# Patient Record
Sex: FEMALE | Race: WHITE | NOT HISPANIC OR LATINO | Employment: OTHER | URBAN - METROPOLITAN AREA
[De-identification: names, ages, dates, MRNs, and addresses within clinical notes are randomized per-mention and may not be internally consistent; named-entity substitution may affect disease eponyms.]

---

## 2017-01-09 ENCOUNTER — APPOINTMENT (OUTPATIENT)
Dept: LAB | Facility: HOSPITAL | Age: 37
End: 2017-01-09
Attending: INTERNAL MEDICINE
Payer: COMMERCIAL

## 2017-01-09 ENCOUNTER — TRANSCRIBE ORDERS (OUTPATIENT)
Dept: ADMINISTRATIVE | Facility: HOSPITAL | Age: 37
End: 2017-01-09

## 2017-01-09 DIAGNOSIS — K76.9 UNSPECIFIED CHRONIC LIVER DISEASE WITHOUT MENTION OF ALCOHOL: Primary | ICD-10-CM

## 2017-01-09 DIAGNOSIS — K76.9 UNSPECIFIED CHRONIC LIVER DISEASE WITHOUT MENTION OF ALCOHOL: ICD-10-CM

## 2017-01-09 LAB
ALBUMIN SERPL BCP-MCNC: 4.1 G/DL (ref 3.5–5)
ALP SERPL-CCNC: 44 U/L (ref 46–116)
ALT SERPL W P-5'-P-CCNC: 24 U/L (ref 12–78)
ANION GAP SERPL CALCULATED.3IONS-SCNC: 7 MMOL/L (ref 4–13)
AST SERPL W P-5'-P-CCNC: 18 U/L (ref 5–45)
BASOPHILS # BLD AUTO: 0.05 THOUSANDS/ΜL (ref 0–0.1)
BASOPHILS NFR BLD AUTO: 1 % (ref 0–1)
BILIRUB SERPL-MCNC: 0.44 MG/DL (ref 0.2–1)
BUN SERPL-MCNC: 19 MG/DL (ref 5–25)
CALCIUM SERPL-MCNC: 9.1 MG/DL (ref 8.3–10.1)
CHLORIDE SERPL-SCNC: 104 MMOL/L (ref 100–108)
CO2 SERPL-SCNC: 28 MMOL/L (ref 21–32)
CREAT SERPL-MCNC: 0.83 MG/DL (ref 0.6–1.3)
EOSINOPHIL # BLD AUTO: 0.06 THOUSAND/ΜL (ref 0–0.61)
EOSINOPHIL NFR BLD AUTO: 1 % (ref 0–6)
ERYTHROCYTE [DISTWIDTH] IN BLOOD BY AUTOMATED COUNT: 12.9 % (ref 11.6–15.1)
GFR SERPL CREATININE-BSD FRML MDRD: >60 ML/MIN/1.73SQ M
GLUCOSE SERPL-MCNC: 94 MG/DL (ref 65–140)
HCT VFR BLD AUTO: 39.7 % (ref 34.8–46.1)
HGB BLD-MCNC: 13.7 G/DL (ref 11.5–15.4)
LYMPHOCYTES # BLD AUTO: 1.61 THOUSANDS/ΜL (ref 0.6–4.47)
LYMPHOCYTES NFR BLD AUTO: 31 % (ref 14–44)
MCH RBC QN AUTO: 31.3 PG (ref 26.8–34.3)
MCHC RBC AUTO-ENTMCNC: 34.5 G/DL (ref 31.4–37.4)
MCV RBC AUTO: 91 FL (ref 82–98)
MONOCYTES # BLD AUTO: 0.68 THOUSAND/ΜL (ref 0.17–1.22)
MONOCYTES NFR BLD AUTO: 13 % (ref 4–12)
NEUTROPHILS # BLD AUTO: 2.81 THOUSANDS/ΜL (ref 1.85–7.62)
NEUTS SEG NFR BLD AUTO: 54 % (ref 43–75)
NRBC BLD AUTO-RTO: 0 /100 WBCS
PLATELET # BLD AUTO: 236 THOUSANDS/UL (ref 149–390)
PMV BLD AUTO: 10.6 FL (ref 8.9–12.7)
POTASSIUM SERPL-SCNC: 4.2 MMOL/L (ref 3.5–5.3)
PROT SERPL-MCNC: 7.9 G/DL (ref 6.4–8.2)
RBC # BLD AUTO: 4.38 MILLION/UL (ref 3.81–5.12)
SODIUM SERPL-SCNC: 139 MMOL/L (ref 136–145)
WBC # BLD AUTO: 5.21 THOUSAND/UL (ref 4.31–10.16)

## 2017-01-09 PROCEDURE — 85025 COMPLETE CBC W/AUTO DIFF WBC: CPT

## 2017-01-09 PROCEDURE — 80053 COMPREHEN METABOLIC PANEL: CPT

## 2017-01-09 PROCEDURE — 36415 COLL VENOUS BLD VENIPUNCTURE: CPT

## 2017-01-16 ENCOUNTER — ALLSCRIPTS OFFICE VISIT (OUTPATIENT)
Dept: OTHER | Facility: OTHER | Age: 37
End: 2017-01-16

## 2017-03-02 ENCOUNTER — ALLSCRIPTS OFFICE VISIT (OUTPATIENT)
Dept: OTHER | Facility: OTHER | Age: 37
End: 2017-03-02

## 2017-03-07 ENCOUNTER — LAB CONVERSION - ENCOUNTER (OUTPATIENT)
Dept: OTHER | Facility: OTHER | Age: 37
End: 2017-03-07

## 2017-03-07 ENCOUNTER — APPOINTMENT (OUTPATIENT)
Dept: LAB | Facility: HOSPITAL | Age: 37
End: 2017-03-07
Payer: COMMERCIAL

## 2017-03-07 ENCOUNTER — TRANSCRIBE ORDERS (OUTPATIENT)
Dept: ADMINISTRATIVE | Facility: HOSPITAL | Age: 37
End: 2017-03-07

## 2017-03-07 DIAGNOSIS — B18.2 CHRONIC HEPATITIS C WITH HEPATIC COMA (HCC): Primary | ICD-10-CM

## 2017-03-07 DIAGNOSIS — B18.2 CHRONIC HEPATITIS C WITH HEPATIC COMA (HCC): ICD-10-CM

## 2017-03-07 PROCEDURE — 86706 HEP B SURFACE ANTIBODY: CPT

## 2017-03-07 PROCEDURE — 87340 HEPATITIS B SURFACE AG IA: CPT

## 2017-03-07 PROCEDURE — 36415 COLL VENOUS BLD VENIPUNCTURE: CPT

## 2017-03-07 PROCEDURE — 86708 HEPATITIS A ANTIBODY: CPT

## 2017-03-08 LAB
HAV AB SER QL IA: NORMAL
HBV SURFACE AB SER-ACNC: <3.1 MIU/ML
HBV SURFACE AG SER QL: NORMAL

## 2017-03-09 ENCOUNTER — ALLSCRIPTS OFFICE VISIT (OUTPATIENT)
Dept: OTHER | Facility: OTHER | Age: 37
End: 2017-03-09

## 2017-03-09 DIAGNOSIS — H02.9 DISORDER OF EYELID: ICD-10-CM

## 2017-03-20 ENCOUNTER — ALLSCRIPTS OFFICE VISIT (OUTPATIENT)
Dept: OTHER | Facility: OTHER | Age: 37
End: 2017-03-20

## 2017-03-29 ENCOUNTER — ALLSCRIPTS OFFICE VISIT (OUTPATIENT)
Dept: OTHER | Facility: OTHER | Age: 37
End: 2017-03-29

## 2017-04-06 ENCOUNTER — GENERIC CONVERSION - ENCOUNTER (OUTPATIENT)
Dept: OTHER | Facility: OTHER | Age: 37
End: 2017-04-06

## 2017-04-19 ENCOUNTER — ALLSCRIPTS OFFICE VISIT (OUTPATIENT)
Dept: OTHER | Facility: OTHER | Age: 37
End: 2017-04-19

## 2017-04-19 DIAGNOSIS — R51 HEADACHE(784.0): ICD-10-CM

## 2017-05-04 ENCOUNTER — GENERIC CONVERSION - ENCOUNTER (OUTPATIENT)
Dept: OTHER | Facility: OTHER | Age: 37
End: 2017-05-04

## 2017-05-12 ENCOUNTER — LAB REQUISITION (OUTPATIENT)
Dept: LAB | Facility: HOSPITAL | Age: 37
End: 2017-05-12
Payer: COMMERCIAL

## 2017-05-12 ENCOUNTER — ALLSCRIPTS OFFICE VISIT (OUTPATIENT)
Dept: OTHER | Facility: OTHER | Age: 37
End: 2017-05-12

## 2017-05-12 DIAGNOSIS — J02.9 ACUTE PHARYNGITIS: ICD-10-CM

## 2017-05-12 LAB — S PYO AG THROAT QL: NEGATIVE

## 2017-05-12 PROCEDURE — 87070 CULTURE OTHR SPECIMN AEROBIC: CPT | Performed by: PHYSICIAN ASSISTANT

## 2017-05-12 PROCEDURE — 87147 CULTURE TYPE IMMUNOLOGIC: CPT | Performed by: PHYSICIAN ASSISTANT

## 2017-05-14 LAB — BACTERIA THROAT CULT: NORMAL

## 2017-05-16 ENCOUNTER — GENERIC CONVERSION - ENCOUNTER (OUTPATIENT)
Dept: OTHER | Facility: OTHER | Age: 37
End: 2017-05-16

## 2017-08-24 ENCOUNTER — APPOINTMENT (OUTPATIENT)
Dept: LAB | Facility: HOSPITAL | Age: 37
End: 2017-08-24
Payer: COMMERCIAL

## 2017-08-24 ENCOUNTER — TRANSCRIBE ORDERS (OUTPATIENT)
Dept: ADMINISTRATIVE | Facility: HOSPITAL | Age: 37
End: 2017-08-24

## 2017-08-24 DIAGNOSIS — B18.2 CHRONIC HEPATITIS C WITH HEPATIC COMA (HCC): Primary | ICD-10-CM

## 2017-08-24 DIAGNOSIS — B18.2 CHRONIC HEPATITIS C WITH HEPATIC COMA (HCC): ICD-10-CM

## 2017-08-24 PROCEDURE — 87522 HEPATITIS C REVRS TRNSCRPJ: CPT

## 2017-08-24 PROCEDURE — 36415 COLL VENOUS BLD VENIPUNCTURE: CPT

## 2017-08-28 LAB
HCV RNA SERPL NAA+PROBE-ACNC: NORMAL IU/ML
TEST INFORMATION: NORMAL

## 2017-09-07 ENCOUNTER — GENERIC CONVERSION - ENCOUNTER (OUTPATIENT)
Dept: OTHER | Facility: OTHER | Age: 37
End: 2017-09-07

## 2017-09-29 ENCOUNTER — ALLSCRIPTS OFFICE VISIT (OUTPATIENT)
Dept: OTHER | Facility: OTHER | Age: 37
End: 2017-09-29

## 2018-01-09 NOTE — RESULT NOTES
Verified Results  * XR SHOULDER 2+ VIEW RIGHT [id number] 02:55PM Reno Bright Order Number: [id number]     Test Name Result Flag Reference   XR SHOULDER 2+ VW RIGHT (Report)     RIGHT SHOULDER     INDICATION: Right shoulder pain  COMPARISON: None     VIEWS: 3; 3 images     FINDINGS:     There is no acute fracture or dislocation  No degenerative changes  No lytic or blastic lesions are seen  Soft tissues are unremarkable  IMPRESSION:     No acute osseous abnormality         Workstation performed: [id number]     Signed by:   Zari Walker MD   2/15/16

## 2018-01-11 NOTE — RESULT NOTES
Discussion/Summary    Chlamydia positive  Pt will require tx with Azithromycin 1,000 mg PO x1  Rx was sent to pharmacy, and pt was notified by office staff yesterday  Pt has appt 8/10/16  Please confirm that she has received treatment at that visit  Repeat screening is also recommended 3 months after treatment            Results/Data     (1) CHLAMYDIA/GC AMPLIFIED DNA, PCR   N  GONORRHOEAE AMPLIFIED DNA: N  gonorrhoeae Amplified DNA Negative   Reference Range N  gonorrhoeae Amplified DNA Negative  CHLAMYDIA,AMPLIFIED DNA PROBE: C  trachomatis Amplified DNA POSITIVE   Abnormal Reference Range C  trachomatis Amplified DNA Negative    Signatures   Electronically signed by : Riley Gama MD; Aug  6 2016  8:13PM EST                       (Author)

## 2018-01-12 NOTE — MISCELLANEOUS
Message  I called and left a message on the patient's voicemail  Message regarding throat culture result        Plan  Acute pharyngitis    · Amoxicillin 875 MG Oral Tablet; TAKE 1 TABLET EVERY 12 HOURS DAILY    Signatures   Electronically signed by : RAJENDRA Parra ; Jul 11 2016  4:54PM EST                       (Author)

## 2018-01-13 VITALS
WEIGHT: 160.5 LBS | OXYGEN SATURATION: 98 % | TEMPERATURE: 96.9 F | HEART RATE: 109 BPM | HEIGHT: 69 IN | RESPIRATION RATE: 18 BRPM | DIASTOLIC BLOOD PRESSURE: 60 MMHG | BODY MASS INDEX: 23.77 KG/M2 | SYSTOLIC BLOOD PRESSURE: 116 MMHG

## 2018-01-13 VITALS
BODY MASS INDEX: 24.59 KG/M2 | SYSTOLIC BLOOD PRESSURE: 102 MMHG | OXYGEN SATURATION: 99 % | WEIGHT: 166 LBS | TEMPERATURE: 97 F | DIASTOLIC BLOOD PRESSURE: 60 MMHG | HEIGHT: 69 IN | RESPIRATION RATE: 18 BRPM | HEART RATE: 125 BPM

## 2018-01-13 VITALS
HEART RATE: 72 BPM | TEMPERATURE: 96.7 F | HEIGHT: 69 IN | BODY MASS INDEX: 23.44 KG/M2 | DIASTOLIC BLOOD PRESSURE: 70 MMHG | RESPIRATION RATE: 18 BRPM | WEIGHT: 158.25 LBS | SYSTOLIC BLOOD PRESSURE: 110 MMHG | OXYGEN SATURATION: 100 %

## 2018-01-13 NOTE — MISCELLANEOUS
Message  I spoke with the patient and informed her that her throat culture was positive  Return I need to place her on amoxicillin for her throat infection  She expressed understanding  Prescription sent electronically to [facility] on [address]        Plan  Acute pharyngitis    · Amoxicillin 875 MG Oral Tablet; TAKE 1 TABLET EVERY 12 HOURS DAILY    Signatures   Electronically signed by : RAJENDRA Rocha ; Jul 11 2016  4:54PM EST                       (Author)

## 2018-01-13 NOTE — PROGRESS NOTES
Assessment    1  Encounter for routine gynecological examination (V72 31) (Z01 419)   2  Current every day smoker (305 1) (F17 200)   3  Encounter for smoking cessation counseling (V65 42,305 1) (Z71 6,Z72 0)    Plan  Encounter for routine gynecological examination    · (1) THIN PREP PAP WITH IMAGING; Status:Active; Requested for:21Mar2016;    Perform:[facility]; RJY:[id number];JEFFY;  Luis Momin for routine gynecological examination; Ordered By:Daryl Montemayor; Maturation index required? : No  HPV? : Regardless of Interpretation  Encounter for smoking cessation counseling    · Nicotine 14 MG/24HR Transdermal Patch 24 Hour; APPLY 1 PATCH DAILY AS  DIRECTED   Rx By: Jovan Crisostomo; Dispense: 14 Days ; #:14 Patch 24 Hour; Refill: 0; For: Encounter for smoking cessation counseling; DENISE = N; Verified Transmission to [facility] [place] [address] [address]; Last Updated By: System, ZENT; 3/21/2016 12:04:45 PM   · Nicotine 21 MG/24HR Transdermal Patch 24 Hour; APPLY 1 PATCH DAILY AS  DIRECTED   Rx By: Jovan Crisostomo; Dispense: 42 Days ; #:42 Patch 24 Hour; Refill: 0; For: Encounter for smoking cessation counseling; DENISE = N; Verified Transmission to [facility]-[address] [place] [address] [address]; Last Updated By: [employer]; 3/21/2016 12:04:43 PM   · Nicotine 7 MG/24HR Transdermal Patch 24 Hour; APPLY 1 PATCH DAILY AS  DIRECTED   Rx By: Jovan Crisostomo; Dispense: 14 Days ; #:14 Patch 24 Hour; Refill: 0; For: Encounter for smoking cessation counseling; DENISE = N; [facility] [place] [address] [address]; Last Updated By: System, [employer]; 3/21/2016 12:04:44 PM  Trichomonal vulvovaginitis    · MetroNIDAZOLE 500 MG Oral Tablet; TAKE 4 TABLETS ONCE   Rx By: Jovan Crisostomo; Dispense: 2 Days ; #:8 Tablet; Refill: 0; For: Trichomonal vulvovaginitis; DENISE = N; [facility] [address]; Msg to Pharmacy: 1x 2 gram dose for pt, 1x 2 gram dose for partner (Trich);  Last Updated By: System, ZENT; 3/21/2016 12:02:35 PM    Discussion/Summary  health maintenance visit Currently, she eats a healthy diet  Pap test was done today Breast cancer screening: breast cancer screening is not indicated  Colorectal cancer screening: colorectal cancer screening is not indicated  Osteoporosis screening: bone mineral density testing is not indicated  Advice and education were given regarding nutrition and aerobic exercise  42 yo [id number] (twins) sexually active female w/partner w/vasectomy presents for annual gyn exam   1  diet, weight loss, exercise: counselled  2  smoking cessation: nicotine patch prescribed  3  Pap: collected  4  Trichimoniasis: flagyl sent 2g x2 doses (1 for her, 1 for partner)  RTC in 2 wks for Trich test of cure and Pap f/u  Chief Complaint  Patient is here for annual exam       History of Present Illness  HPI: 42 yo V2Y0013 (twins) sexually active female w/partner w/vasectomy presents for annual gyn exam     Ob hx: 1x c/s, 2xSVD  Gyn hx: LMP 3/3/2016, menarche at 13, regular periods of 28 day cycles w/5 days of moderate menses, no skipped periods or cramping   Menopausal sx: denies dyspareunia, hot flashes, night sweats  Sexual hx: opposite sex, vaginal, oral, anal, 1 partner in last 13 months, age of intercourse first onset 13  Birth control hx: vasectomy in partner  STI hx: genital warts --no recurrence since 2004  Pap hx: NILM but positive co-test other high risk type 2015; [facility] and in 2014 was ASCUS w/positive high-risk HPV and never had colposcopy    Mammogram hx: never  Colonoscopy hx: never  DEXA hx: never  Mental questionnaire score: 0  Social hx: smoke 1 ppd, wishes to quit, 12 step NA program completed July 4, 2013: crack + heroin + "everything"  Infectious disease: Hepatitis C (tx: GI doctor states viral load is extremely low, genotype is undeterminable, will f/u in 1 year)     Trich: pt denies fever, chills, vaginal itching or discharge, dysuria, burning sensation on urination  Speculum exam: no strawberry cervix, white mucoid discharge from cervix in posterior fornix collected, KOH neg but Wet mount positive for Trichomoniasis   [facility] The patient is being seen for a health maintenance evaluation  General Health: The patient's health since the last visit is described as good  She has regular dental visits  She complains of vision problems  Vision care includes wearing reading glasses  She denies hearing loss  Immunizations status: up to date  Lifestyle:  She consumes a diverse and healthy diet  She exercises regularly  She exercises less than three times a week  She uses tobacco  The patient is a current cigarette smoker  She denies alcohol use  She denies drug use  Reproductive health:  she reports no menstrual problems  she uses contraception  For contraception, she has a partner with a vasectomy  she is sexually active  She is monogamous with a male partner  pregnancy history:  Screening:      Review of Systems  no pelvic pain, no pelvic pressure, no vaginal pain, no vaginal discharge, no vaginal itching, no vulvar pain, no vulvar itching, no vulvar lump or mass, no dysmenorrhea, denied amenorrhea, no menorrhagia, no dysuria, no bladder pain, no hematuria and no burning sensation during urination  Constitutional: no fever and no chills  ENT: no sore throat and no hoarseness  Cardiovascular: no chest pain and no palpitations  Gastrointestinal: no constipation and no diarrhea  Genitourinary: no dysuria and no unexplained vaginal bleeding  Integumentary: no rashes  Over the past 2 weeks, how often have you been bothered by the following problems? Score 0      OB History  Pregnancy History (Brief):   Prior pregnancies: : 5  Para: 3 (full-term), 3 (abortions) and 3 (living)   Delivery type: 2 vaginal, 1  section   1 set of twins      Active Problems    1   Acute sinusitis (461 9) (J01 90)   2  Acute upper respiratory infection (465 9) (J06 9)   3  Condyloma acuminata (078 11) (A63 0)   4  Depression (311) (F32 9)   5  Headache (784 0) (R51)   6  Hemorrhoid (455 6) (K64 9)   7  Hepatitis C (070 70) (B19 20)   8  High risk HPV infection (079 4) (A63 0)   9  History of heroin abuse (305 53) (Z87 898)   10  Screen for STD (sexually transmitted disease) (V74 5) (Z11 3)   11  Shoulder pain, right (719 41) (M25 511)   12  Tear of right scapholunate ligament (842 19) (S63 8X1A)   13  Trichomonal vulvovaginitis (131 01) (A59 01)   14  Vaginal candidiasis (112 1) (B37 3)   15  Wrist pain, right (719 43) (M25 531)    Surgical History    · History of  Section    Family History    · Family history of Diabetes mellitus    Social History    · Current every day smoker (305 1) (F17 200)   · H/O intravenous drug use in remission (305 93) (Z87 898)   · No alcohol use    Current Meds   1  Sertraline HCl - 100 MG Oral Tablet; TAKE 1 AND 1/2 TABLETS DAILY; Therapy: 57HCD3982 to (Last Rx:48Jnx5279)  Requested for: 26VNT0461 Ordered   2  Zoloft TABS Recorded    Allergies    1  No Known Drug Allergies    Vitals   Recorded: 75BQN2956 89:18KZ   Systolic 967   Diastolic 74   Height 5 ft 8 11 in   Weight 154 lb    BMI Calculated 23 34   BSA Calculated 1 83   LMP 92PDY4976     Physical Exam    Constitutional   General appearance: No acute distress, well appearing and well nourished  Neck   Neck: Normal, supple, trachea midline, no masses  Thyroid: Normal, no thyromegaly  Pulmonary   Respiratory effort: No increased work of breathing or signs of respiratory distress  Auscultation of lungs: Clear to auscultation  Cardiovascular   Auscultation of heart: Normal rate and rhythm, normal S1 and S2, no murmurs  Genitourinary   External genitalia: Normal and no lesions appreciated  Vagina: Normal, no lesions or dryness appreciated      Urethra: Normal     Urethral meatus: Normal     Bladder: Normal, soft, non-tender and no prolapse or masses appreciated  Cervix: Normal, no palpable masses  Uterus: Normal, non-tender, not enlarged, and no palpable masses  Adnexa/parametria: Normal, non-tender and no fullness or masses appreciated  Chest   Breasts: Normal and no dimpling or skin changes noted  Abdomen   Abdomen: Normal, non-tender, and no organomegaly noted  Skin   Skin and subcutaneous tissue: Normal skin turgor and no rashes  Palpation of skin and subcutaneous tissue: Normal     Psychiatric   Orientation to person, place, and time: Normal     Mood and affect: Normal        Future Appointments    Date/Time Provider Specialty Site   04/20/2016 09:50 AM RAJENDRA Darden   Orthopedic Surgery [facility]     Signatures   Electronically signed by : RAJENDRA Guzman ; Mar 21 2016 12:09PM EST                       (Author)

## 2018-01-14 VITALS
HEART RATE: 72 BPM | BODY MASS INDEX: 24.34 KG/M2 | OXYGEN SATURATION: 98 % | RESPIRATION RATE: 16 BRPM | DIASTOLIC BLOOD PRESSURE: 76 MMHG | HEIGHT: 69 IN | WEIGHT: 164.31 LBS | SYSTOLIC BLOOD PRESSURE: 124 MMHG | TEMPERATURE: 96.7 F

## 2018-01-15 ENCOUNTER — OFFICE VISIT (OUTPATIENT)
Dept: URGENT CARE | Facility: MEDICAL CENTER | Age: 38
End: 2018-01-15
Payer: COMMERCIAL

## 2018-01-15 VITALS
DIASTOLIC BLOOD PRESSURE: 76 MMHG | SYSTOLIC BLOOD PRESSURE: 124 MMHG | TEMPERATURE: 97.2 F | RESPIRATION RATE: 18 BRPM | OXYGEN SATURATION: 98 % | HEART RATE: 90 BPM | HEIGHT: 69 IN | WEIGHT: 167.56 LBS | BODY MASS INDEX: 24.82 KG/M2

## 2018-01-15 VITALS
WEIGHT: 162 LBS | BODY MASS INDEX: 23.99 KG/M2 | SYSTOLIC BLOOD PRESSURE: 120 MMHG | HEIGHT: 69 IN | RESPIRATION RATE: 18 BRPM | TEMPERATURE: 97.4 F | HEART RATE: 80 BPM | OXYGEN SATURATION: 99 % | DIASTOLIC BLOOD PRESSURE: 72 MMHG

## 2018-01-15 DIAGNOSIS — R68.89 OTHER GENERAL SYMPTOMS AND SIGNS: ICD-10-CM

## 2018-01-15 PROCEDURE — G0463 HOSPITAL OUTPT CLINIC VISIT: HCPCS

## 2018-01-15 PROCEDURE — 99214 OFFICE O/P EST MOD 30 MIN: CPT

## 2018-01-15 NOTE — MISCELLANEOUS
Provider Comments  Provider Comments:   PT NO SHOW FOR 2WKS RESULTS      Signatures   Electronically signed by : Dianne Muniz, ; Apr 11 2016  1:28PM EST                       (Author)    Electronically signed by : DENNISE Raines;  Apr 11 2016  4:00PM EST                       (Acknowledgement)    Electronically signed by : Josue Araujo MD; Apr 21 2016  8:36AM EST

## 2018-01-15 NOTE — RESULT NOTES
Verified Results  (1) THROAT CULTURE (CULTURE, UPPER RESPIRATORY) [id number] 03:38PM Dannielle Whiting     Test Name Result Flag Reference   CLINICAL REPORT (Report)     Test:        Throat culture  Specimen Source:  Throat  Specimen Type:   Throat  Specimen Date:   5/12/2017 3:38 PM  Result Date:    5/14/2017 11:36 AM  Result Status:   Final result  Resulting Lab:   [place] [address]            Tel: [phone]      CULTURE                                       ------------------                                   1+ Growth of Beta Hemolytic Streptococcus NOT Group A, C, or G

## 2018-01-16 ENCOUNTER — ALLSCRIPTS OFFICE VISIT (OUTPATIENT)
Dept: OTHER | Facility: OTHER | Age: 38
End: 2018-01-16

## 2018-01-16 ENCOUNTER — APPOINTMENT (OUTPATIENT)
Dept: LAB | Facility: HOSPITAL | Age: 38
End: 2018-01-16
Payer: COMMERCIAL

## 2018-01-16 DIAGNOSIS — R68.89 OTHER GENERAL SYMPTOMS AND SIGNS: ICD-10-CM

## 2018-01-16 LAB
FLUAV AG SPEC QL: ABNORMAL
FLUBV AG SPEC QL: DETECTED
RSV B RNA SPEC QL NAA+PROBE: ABNORMAL

## 2018-01-16 PROCEDURE — 87798 DETECT AGENT NOS DNA AMP: CPT

## 2018-01-16 NOTE — RESULT NOTES
Verified Results  * XR WRIST 3+ VIEW RIGHT [id number] 02:55PM Irina Villeda Order Number: [id number]     Test Name Result Flag Reference   XR WRIST 3+ VW RIGHT (Report)     RIGHT WRIST     INDICATION:  Right scaphoid pain  No known injury  COMPARISON: None     VIEWS: 4; 4 images     FINDINGS:     There is no acute fracture or dislocation  No degenerative changes  No lytic or blastic lesions are seen  Soft tissues are unremarkable  IMPRESSION:     Normal examination         Workstation performed: [id number]     Signed by:   Jennifer Ford MD   2/15/16

## 2018-01-16 NOTE — PROGRESS NOTES
Assessment   1  Flu-like symptoms (780 99) (R68 89)  2  Cough (786 2) (R05)    Plan   Cough    · Start: Bromfed DM 30-2-10 MG/5ML Oral Syrup; SWALLOW 10 ML Every 6 hours PRN  Flu-like symptoms    · Start: Oseltamivir Phosphate 75 MG Oral Capsule (Tamiflu); TAKE 1 CAPSULE TWICE    DAILY WITH MEALS    Discussion/Summary   Discussion Summary:    May alternate Tylenol and Ibuprofen as needed  fluids and rest   nasal spray as needed  bedroom  water gargles  spray and lozenges as needed  Tamiflu as directed  flu swab results  with PCP if symptoms persist/worsen or go to nearest emergency department if any signs of distress  Medication Side Effects Reviewed: Possible side effects of new medications were reviewed with the patient/guardian today  Understands and agrees with treatment plan: The treatment plan was reviewed with the patient/guardian  The patient/guardian understands and agrees with the treatment plan    Counseling Documentation With Imm: The patient was counseled regarding instructions for management  Follow Up Instructions: Follow Up with your Primary Care Provider in 3-5 days  If your symptoms worsen, go to the nearest [place] [address] Emergency Department  Chief Complaint   Chief Complaint Free Text Note Form: Patient presents with hot sweats and feeling cold  History of Present Illness   HPI: Patient presents with hot sweats and feeling cold since yesterday  Reports about one week ago had been coughing up mucus and rhinorrhea which is getting worse though yesterday with feeling of hot and cold, extreme body aches, sinus pressure, as well as diarrhea  Has been taking Ibuprofen 800 mg BID, also honey and tea, cough lozenges, sudafed yesterday, with no improvement of symptoms  Current smoker  Did not have flu vaccine this season  No seasonal allergies  Review of Systems   Focused-Female:      Constitutional: feeling poorly-- and-- chills, but-- no fever        ENT: nasal discharge, but-- no earache-- and-- no sore throat  Cardiovascular: no complaints of slow or fast heart rate, no chest pain, no palpitations, no leg claudication or lower extremity edema  Respiratory: no complaints of shortness of breath, no wheezing, no dyspnea on exertion, no orthopnea or PND  Gastrointestinal: no complaints of abdominal pain, no constipation, no nausea or diarrhea, no vomiting, no bloody stools  Musculoskeletal: no myalgias  Integumentary: no rashes  Active Problems   1  Abnormal menses (626 9) (N92 6)  2  Cervical cancer screening (V76 2) (Z12 4)  3  Condyloma acuminata (078 11) (A63 0)  4  Contraception (V25 9) (Z30 9)  5  Depression (311) (F32 9)  6  Encounter for Medicare annual wellness exam (V70 0) (Z00 00)  7  Encounter for smoking cessation counseling (V65 42,305 1) (Z71 6,Z72 0)  8  Encounter to discuss test results (V65 49) (Z71 89)  9  Follow-up for resolved condition (V67 59) (Z09)  10  Headache (784 0) (R51)  11  Hemorrhoid (455 6) (K64 9)  12  High risk HPV infection (079 4) (B97 7)  13  History of heroin abuse (305 53) (Z87 898)  14  Need for influenza vaccination (V04 81) (Z23)  15  Personal history of nicotine dependence (V15 82) (Z87 891)  16  Sacroiliitis (720 2) (M46 1)  17  Screen for STD (sexually transmitted disease) (V74 5) (Z11 3)  18  Trichomonal vulvovaginitis (131 01) (A59 01)  19  Vaginal candidiasis (112 1) (B37 3)    Past Medical History   1  History of Abnormality of eyelid (374 9) (H02 9)  2  History of Acute frontal sinusitis (461 1) (J01 10)  3  Acute pharyngitis (462) (J02 9)  4  History of Acute upper respiratory infection (465 9) (J06 9)  5  History of Cellulitis of left upper eyelid (373 13) (H00 034)  6  History of Costochondral pain (786 52) (R07 1)  7  History of Encounter for routine gynecological examination (V72 31) (Z01 419)  8  History of Ganglion cyst of wrist, right (117 41) (I40 712)  9   History of abdominal pain (V13 89) (Z87 898)  10  History of acute sinusitis (V12 69) (Z87 09)  11  History of chlamydia infection (V12 09) (Z86 19)  12  History of drug abuse (305 93) (Z87 898)  13  History of gastritis (V12 79) (Z87 19)  14  History of hepatitis C virus infection (V12 09) (Z86 19)  15  History of sore throat (V12 60) (Z87 09)  16  History of Hordeolum externum of left eye, unspecified eyelid (373 11) (H00 016)  17  History of Shoulder pain, right (719 41) (M25 511)  18  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)  19  History of Stye (373 11) (H00 019)  20  History of Tear of right scapholunate ligament (842 19) (S63 8X1A)  21  History of Tightness in chest (786 59) (R07 89)  22  History of Wrist pain, right (719 43) (M25 531)  Active Problems And Past Medical History Reviewed: The active problems and past medical history were reviewed and updated today  Family History   Mother   1  Family history of myocardial infarction (V17 3) (Z82 49)  2  Family history of Leiomyosarcoma  Father   3  Family history of epilepsy (V17 2) (Z82 0)  Brother   4  Family history of Diabetes mellitus  Family History Reviewed: The family history was reviewed and updated today  Social History    · Current every day smoker (305 1) (F17 200)   · History of Current every day smoker (305 1) (F17 200)   · Daily caffeine consumption, 6-8 servings a day   · H/O intravenous drug use in remission (305 93) (L73 614)   · No alcohol use  Social History Reviewed: The social history was reviewed and updated today  The social history was reviewed and is unchanged  Surgical History   1  History of  Section  2  History of Surgical Treatment For   Surgical History Reviewed: The surgical history was reviewed and updated today  Current Meds   1  Harvoni  MG Oral Tablet; Therapy: 21AAK4401 to Recorded  2   Nicotine 14 MG/24HR Transdermal Patch 24 Hour; APPLY 1 PATCH DAILY, AFTER     COMPLETING 21 MG PATCH; Therapy: 45QJF7560 to (Evaluate:26May2017)  Requested for: 46VXF8745; Last     Rx:12May2017 Ordered  3  Nicotine 21 MG/24HR Transdermal Patch 24 Hour; PLACE 1 PATCH Daily; Therapy: 28CLR2627 to (Evaluate:23Jun2017)  Requested for: 02DIK1511; Last     Rx:12May2017 Ordered  4  Nicotine 7 MG/24HR Transdermal Patch 24 Hour; APPLY 1 PATCH DAILY, AFTER     COMPLETING 14 MG PATCH; Therapy: 00FBM1031 to (Evaluate:26May2017)  Requested for: 32PAA4105; Last     Rx:12May2017 Ordered  5  Pentasa 500 MG Oral Capsule Extended Release; Therapy: (Recorded:10Aug2016) to Recorded  6  Sertraline HCl - 100 MG Oral Tablet; TAKE 1 AND 1/2 TABLET BY MOUTH ONCE DAILY; Therapy: 04LJU4266 to ([id number])  Requested for: 35KQD3029; Last     Rx:68Ojv4540 Ordered  Medication List Reviewed: The medication list was reviewed and updated today  Allergies   1  No Known Drug Allergies    Vitals   Signs   Recorded: 91RSB9688 05:04PM   Temperature: 99 4 F  Heart Rate: 114  Respiration: 16  Systolic: 946  Diastolic: 70  Height: 5 ft 9 in  Weight: 168 lb   BMI Calculated: 24 81  BSA Calculated: 1 92  O2 Saturation: 99    Physical Exam        Constitutional      General appearance: No acute distress, well appearing and well nourished  -- Pleasant and cooperative  Appears ill  Eyes      Conjunctiva and lids: No swelling, erythema or discharge  Pupils and irises: Equal, round and reactive to light  Ears, Nose, Mouth, and Throat      External inspection of ears and nose: Normal        Otoscopic examination: Tympanic membranes translucent with normal light reflex  Canals patent without erythema  Nasal mucosa, septum, and turbinates: Normal without edema or erythema  Oropharynx: Abnormal  -- Posterior pharynx mildly erythematous no exudate noted  Pulmonary      Respiratory effort: No increased work of breathing or signs of respiratory distress         Auscultation of lungs: Clear to auscultation  Cardiovascular      Palpation of heart: Normal PMI, no thrills  Auscultation of heart: Normal rate and rhythm, normal S1 and S2, without murmurs  Examination of extremities for edema and/or varicosities: Normal        Lymphatic      Palpation of lymph nodes in neck: No lymphadenopathy  -- soft non tender anterior cervical lymphadenopathy  Musculoskeletal      Gait and station: Normal        Skin      Skin and subcutaneous tissue: Normal without rashes or lesions  Psychiatric      Orientation to person, place, and time: Normal        Mood and affect: Normal        Provider Comments   Provider Comments:    I have reviewed the student's history and physical, I have personally examined the patient and agree with the above stated findings and plan  At this time will start patient on Tamiflu and recommend symptomatic management  Follow up with PCP for further management  Message   Return to work or school:    Sheridan HUBBARD is under my professional care  She was seen in my office on Monday, January 15, 2018    She is able to return to work on  May return when afebrile x 24 hours  No restrictions  DENNISE Baird        Future Appointments      Date/Time Provider Specialty Site   01/16/2018 02:20 PM Jil More, [address] [place] [address]     Signatures    Electronically signed by : Jessica Paula, [address] [place] [address]; [place] 15 2018  5:36PM EST                       (Author)     Electronically signed by : RAJENDRA Mesa ; [place] 15 2018  7:57PM EST

## 2018-01-17 ENCOUNTER — GENERIC CONVERSION - ENCOUNTER (OUTPATIENT)
Dept: OTHER | Facility: OTHER | Age: 38
End: 2018-01-17

## 2018-01-17 NOTE — MISCELLANEOUS
Provider Comments  Provider Comments:   Patient did not show for 11:20am appointment        Signatures   Electronically signed by : DENNISE Hurst; Mar  2 2017  1:25PM EST                       (Author)

## 2018-01-22 VITALS
DIASTOLIC BLOOD PRESSURE: 76 MMHG | OXYGEN SATURATION: 99 % | TEMPERATURE: 97.3 F | SYSTOLIC BLOOD PRESSURE: 120 MMHG | RESPIRATION RATE: 18 BRPM | BODY MASS INDEX: 24.59 KG/M2 | WEIGHT: 166 LBS | HEIGHT: 69 IN | HEART RATE: 103 BPM

## 2018-01-23 VITALS
SYSTOLIC BLOOD PRESSURE: 110 MMHG | HEART RATE: 114 BPM | TEMPERATURE: 99.4 F | OXYGEN SATURATION: 99 % | BODY MASS INDEX: 24.88 KG/M2 | RESPIRATION RATE: 16 BRPM | DIASTOLIC BLOOD PRESSURE: 70 MMHG | WEIGHT: 168 LBS | HEIGHT: 69 IN

## 2018-01-23 NOTE — MISCELLANEOUS
SECOND NO-SHOW WARNING LETTER  Dear  Crystal Farley:    You have had _2_ No-Show appointments  at our office (3/2/17 1120AM, 1/16/18 220PM)  A No-Show is defined as any patient who fails to arrive for a scheduled appointment without canceling the appointment  prior to the scheduled time  A patient who has more than THREE No-Shows may be dismissed from an [address] [place] [address]  Please call in advance to cancel appointments  If you continue to No-Show for scheduled appointments,  you may be dismissed from our practice  Thank You  Usted ha tenido _2_ No-Show citas en nuestra oficina (3/2/17 1120AM, 1/16/18 220PM)  Un No-Show se define  cindy cualquier paciente que no llega a dipak gema programada sin cancelar la gema antes de la hora programada  Un paciente que tiene más de ENEL No-Show puede ser despedido de un [facility] práctica  Por favor llame con anticipación para cancelar citas  Si continúa la "No-Show" para las citas programadas, usted puede ser despedido de nuestra práctica  [place]

## 2018-01-23 NOTE — MISCELLANEOUS
Provider Comments  Provider Comments:   Patient was a no show to today's appointment at [phone]        Signatures   Electronically signed by : XIANG Simpson; Jan 17 2018  9:08AM EST                       (Author)

## 2018-01-24 NOTE — MISCELLANEOUS
Message  Return to work or school:   Julianna HUBBARD is under my professional care  She was seen in my office on Monday, January 15, 2018   She is able to return to work on  May return when afebrile x 24 hours  No restrictions  DENNISE Miller        Future Appointments    Signatures   Electronically signed by : Montserrat Maki, [facility]; [place] 15 2018  5:36PM EST                       (Author)    Electronically signed by : Montserrat Maki [facility]; [place] 15 2018  5:39PM EST                       (Author)

## 2018-04-23 RX ORDER — SERTRALINE HYDROCHLORIDE 100 MG/1
TABLET, FILM COATED ORAL
Qty: 45 TABLET | Refills: 5 | OUTPATIENT
Start: 2018-04-23

## 2018-04-23 NOTE — TELEPHONE ENCOUNTER
Please review request for medication refill  I am no longer associated with the [facility] and no longer the patient's PCP

## 2018-05-04 RX ORDER — SERTRALINE HYDROCHLORIDE 100 MG/1
TABLET, FILM COATED ORAL
Qty: 45 TABLET | Refills: 5 | OUTPATIENT
Start: 2018-05-04

## 2018-05-04 NOTE — TELEPHONE ENCOUNTER
Please review request for medication refill  I am no longer associated with the [facility] and no longer the patient's PCP

## 2018-05-06 ENCOUNTER — HOSPITAL ENCOUNTER (EMERGENCY)
Facility: HOSPITAL | Age: 38
Discharge: HOME | End: 2018-05-06
Attending: EMERGENCY MEDICINE | Admitting: EMERGENCY MEDICINE
Payer: COMMERCIAL

## 2018-05-06 VITALS
RESPIRATION RATE: 18 BRPM | SYSTOLIC BLOOD PRESSURE: 128 MMHG | OXYGEN SATURATION: 99 % | TEMPERATURE: 97.1 F | HEART RATE: 90 BPM | DIASTOLIC BLOOD PRESSURE: 72 MMHG

## 2018-05-06 DIAGNOSIS — Z76.0 MEDICATION REFILL: Primary | ICD-10-CM

## 2018-05-06 PROCEDURE — 99283 EMERGENCY DEPT VISIT LOW MDM: CPT

## 2018-05-07 RX ORDER — SERTRALINE HYDROCHLORIDE 100 MG/1
TABLET, FILM COATED ORAL
Qty: 45 TABLET | Refills: 5 | OUTPATIENT
Start: 2018-05-07

## 2018-05-07 NOTE — TELEPHONE ENCOUNTER
Please review request for medication refill  I am no longer associated with the [facility] and no longer the patient's PCP

## 2018-05-07 NOTE — ED PROVIDER NOTES
History  Chief Complaint   Patient presents with    Dizziness     reports dizziness, states ran out of zoloft wed, dizziness started th now worse  40-year-old female presents for evaluation of running out of her Zoloft  She states 3 days ago she ran out of her prescription for 150 mg of Zoloft which she has taken daily for the past 6 months  She reports calling her primary care office who said she needed to be seen prior to refilling a prescription and she has an appointment on Wednesday  She reports symptoms described as "fogginess"  She also reports intermittent dizziness which she describes as lightheaded with no syncopal episodes or vertigo sensation  She denies any headache any recent head trauma any fevers chills cough nausea vomiting diarrhea constipation  No tremors, palpitations  Prior to Admission Medications   Prescriptions Last Dose Informant Patient Reported? Taking? mesalamine (PENTASA) 250 mg CR capsule 2018 at Unknown time  Yes Yes   Sig: Take 500 mg by mouth 4 (four) times a day   sertraline (ZOLOFT) 100 mg tablet Past Month at Unknown time  Yes Yes   Sig: Take 150 mg by mouth daily  Facility-Administered Medications: None       Past Medical History:   Diagnosis Date    Abdominal pain     EGD today -2016    Arthritis     shoulders    Hepatitis C     dx 15 yrs ago- retested told undetectable    Pyelonephritis     pyelonephritis    Stargardt's disease     Torn ligament     R   wrist    Wears glasses     reading       Past Surgical History:   Procedure Laterality Date     SECTION      DENTAL SURGERY      [place] ESOPHAGOGASTRODUODENOSCOPY TRANSORAL DIAGNOSTIC N/A 2016    Procedure: ESOPHAGOGASTRODUODENOSCOPY (EGD); Surgeon: Kaye Morrison MD;  Location: [facility]; Service: Gastroenterology       History reviewed  No pertinent family history  I have reviewed and agree with the history as documented      Social History   Substance Use Topics  Smoking status: Current Every Day Smoker     Packs/day: 1 00     Years: 25 00    Smokeless tobacco: Never Used    Alcohol use No      Comment: former ETOH abuse-per pt        Review of Systems   Constitutional: Negative for chills, fatigue and fever  HENT: Negative for congestion, ear pain, postnasal drip, rhinorrhea, sinus pressure and trouble swallowing  Eyes: Negative for photophobia, pain and visual disturbance  Respiratory: Negative for cough, chest tightness, shortness of breath and wheezing  Cardiovascular: Negative for chest pain and palpitations  Gastrointestinal: Negative for abdominal distention, abdominal pain, constipation, diarrhea, nausea and vomiting  Genitourinary: Negative for dysuria, hematuria and urgency  Musculoskeletal: Negative for arthralgias, back pain, myalgias, neck pain and neck stiffness  Skin: Negative for rash  Allergic/Immunologic: Negative for immunocompromised state  Neurological: Positive for dizziness  Negative for tremors, seizures, speech difficulty, weakness, numbness and headaches  Hematological: Negative for adenopathy  Physical Exam  ED Triage Vitals [05/06/18 2101]   Temperature Pulse Respirations Blood Pressure SpO2   (!) 97 1 °F (36 2 °C) 90 18 128/72 99 %      Temp Source Heart Rate Source Patient Position - Orthostatic VS BP Location FiO2 (%)   Temporal -- Sitting Left arm --      Pain Score       No Pain           Orthostatic Vital Signs  Vitals:    05/06/18 2101   BP: 128/72   Pulse: 90   Patient Position - Orthostatic VS: Sitting       Physical Exam   Constitutional: She is oriented to person, place, and time  Vital signs are normal  She appears well-developed and well-nourished  She does not appear ill  No distress  HENT:   Head: Normocephalic and atraumatic  Head is without abrasion and without contusion     Right Ear: Tympanic membrane normal    Left Ear: Tympanic membrane normal    Nose: Nose normal    Mouth/Throat: Uvula is midline, oropharynx is clear and moist and mucous membranes are normal    Eyes: Conjunctivae and EOM are normal  Pupils are equal, round, and reactive to light  Neck: Trachea normal, normal range of motion, full passive range of motion without pain and phonation normal  Neck supple  No JVD present  No spinous process tenderness and no muscular tenderness present  Carotid bruit is not present  Normal range of motion present  No thyromegaly present  Cardiovascular: Normal rate, regular rhythm and intact distal pulses  Exam reveals no friction rub  No murmur heard  Pulmonary/Chest: Effort normal and breath sounds normal  No accessory muscle usage or stridor  No tachypnea  No respiratory distress  She has no decreased breath sounds  She has no wheezes  She has no rhonchi  She has no rales  She exhibits no tenderness, no crepitus, no edema and no retraction  Abdominal: Soft  Normal appearance and bowel sounds are normal  She exhibits no distension  There is no tenderness  There is no rigidity, no rebound, no guarding and no CVA tenderness  Musculoskeletal: Normal range of motion  Lymphadenopathy:     She has no cervical adenopathy  Neurological: She is alert and oriented to person, place, and time  She has normal strength  No sensory deficit  GCS eye subscore is 4  GCS verbal subscore is 5  GCS motor subscore is 6  Skin: Skin is warm, dry and intact  No rash noted  She is not diaphoretic  Psychiatric: Her speech is normal  Her mood appears anxious  She is not agitated  She does not express impulsivity  She does not exhibit a depressed mood  She expresses no homicidal and no suicidal ideation  Nursing note and vitals reviewed        ED Medications  Medications - No data to display    Diagnostic Studies  Results Reviewed     None                 No orders to display         Procedures  Procedures      Phone Consults  ED Phone Contact    ED Course          patient provided with 3 doses of medication and discussed to call her doctor in the morning for a follow-up appointment for her maintenance medication  Discussed at the emergency department does not provide long-term maintenance medication  Patient agreeable understands return precautions  MDM  CritCare Time    Disposition  Final diagnoses:   Medication refill     Time reflects when diagnosis was documented in both MDM as applicable and the Disposition within this note     Time User Action Codes Description Comment    5/6/2018  9:27 PM Tariqhor, Sharla Angelucci Add [Z76 0] Medication refill       ED Disposition     ED Disposition Condition Comment    Discharge  Mirtha ANDERSON Sungapryl discharge to home/self care  Condition at discharge: Good        Follow-up Information     Follow up With Specialties Details Why [address] [facility] Emergency Department Emergency Medicine Go to If symptoms worsen [address] [facility] ED, [address] Karin Johnson , Wilmar Muñoz MD Family Medicine Schedule an appointment as soon as possible for a visit in 2 days As needed [address] [address]  [phone]           Discharge Medication List as of 5/6/2018  9:29 PM      START taking these medications    Details   !! sertraline (ZOLOFT) 50 mg tablet Take 3 tablets (150 mg total) by mouth daily, Starting Sun 5/6/2018, Print       !! - Potential duplicate medications found  Please discuss with provider  CONTINUE these medications which have NOT CHANGED    Details   mesalamine (PENTASA) 250 mg CR capsule Take 500 mg by mouth 4 (four) times a day, Historical Med      !! sertraline (ZOLOFT) 100 mg tablet Take 150 mg by mouth daily  , Until Discontinued, Historical Med       !! - Potential duplicate medications found  Please discuss with provider  No discharge procedures on file      ED Provider  Attending physically available and evaluated Mirtha Patel I managed the patient along with the ED Attending      Electronically Signed by         DO Peter  05/07/18 4849

## 2018-05-07 NOTE — ED ATTENDING ATTESTATION
Rufino Or, DO, saw and evaluated the patient  I have discussed the patient with the resident/non-physician practitioner and agree with the resident's/non-physician practitioner's findings, Plan of Care, and MDM as documented in the resident's/non-physician practitioner's note, except where noted  All available labs and Radiology studies were reviewed  At this point I agree with the current assessment done in the Emergency Department  I have conducted an independent evaluation of this patient a history and physical is as follows:    Patient ran out of zoloft 150mg several days ago  Unable to get refill  Now with lightheadedness and fogginess  Patient denies SI/HI      On examination:  The patient is awake, alert and oriented  HEENT: Normocephalic/atraumatic  External examination of the ears is unremarkable  Pupils are equal round and reactive to light, there is no conjunctival injection or scleral icterus noted  Nares are patent without rhinorrhea  The oropharynx is moist without injection  The neck is supple  Lungs: Clear to auscultation bilaterally  Heart: Regular without murmurs rubs or gallops  Abdomen: Soft and nontender  There are positive bowel sounds  there is no rebound or guarding  Musculoskeletal: Normal range of motion with grossly normal strength  Neuro: Cranial nerves II through XII grossly intact  Nonfocal exam  Skin: No rash noted  Psych:  Tearful, anxious    The plan is to refill the patient's Zoloft for 3 days to bridge her until she can follow up as an outpatient  Patient was advised about the utility the emergency department for medication refill and to ensure that she is stab she is with her outpatient provider for routine mental health care      Critical Care Time  CritCare Time    Procedures

## 2018-05-09 ENCOUNTER — OFFICE VISIT (OUTPATIENT)
Dept: FAMILY MEDICINE CLINIC | Facility: CLINIC | Age: 38
End: 2018-05-09
Payer: COMMERCIAL

## 2018-05-09 VITALS
RESPIRATION RATE: 18 BRPM | OXYGEN SATURATION: 97 % | BODY MASS INDEX: 25.03 KG/M2 | HEART RATE: 92 BPM | TEMPERATURE: 97.4 F | WEIGHT: 169 LBS | DIASTOLIC BLOOD PRESSURE: 78 MMHG | HEIGHT: 69 IN | SYSTOLIC BLOOD PRESSURE: 110 MMHG

## 2018-05-09 DIAGNOSIS — F32.A DEPRESSION, UNSPECIFIED DEPRESSION TYPE: Primary | ICD-10-CM

## 2018-05-09 DIAGNOSIS — Z76.0 MEDICATION REFILL: ICD-10-CM

## 2018-05-09 DIAGNOSIS — Z12.31 ENCOUNTER FOR SCREENING MAMMOGRAM FOR BREAST CANCER: ICD-10-CM

## 2018-05-09 DIAGNOSIS — Z13.220 SCREENING CHOLESTEROL LEVEL: ICD-10-CM

## 2018-05-09 PROCEDURE — 3008F BODY MASS INDEX DOCD: CPT | Performed by: PHYSICIAN ASSISTANT

## 2018-05-09 PROCEDURE — 99213 OFFICE O/P EST LOW 20 MIN: CPT | Performed by: PHYSICIAN ASSISTANT

## 2018-05-09 RX ORDER — SERTRALINE HYDROCHLORIDE 100 MG/1
TABLET, FILM COATED ORAL
Qty: 90 TABLET | Refills: 1 | Status: SHIPPED | OUTPATIENT
Start: 2018-05-09 | End: 2018-10-25 | Stop reason: SDUPTHER

## 2018-05-09 NOTE — ASSESSMENT & PLAN NOTE
Stable  Continue with Zoloft 150 mg daily  If there are any changes in symptoms, make a follow up appointment

## 2018-05-09 NOTE — PROGRESS NOTES
Assessment/Plan:    Depression  Stable  Continue with Zoloft 150 mg daily  If there are any changes in symptoms, make a follow up appointment  Gave script for routine lab work and mammogram      Diagnoses and all orders for this visit:    Depression, unspecified depression type  -     CBC and differential; Future  -     Comprehensive metabolic panel; Future  -     TSH, 3rd generation with T4 reflex; Future  -     Urinalysis with reflex to microscopic; Future  -     sertraline (ZOLOFT) 100 mg tablet; Take 1 tablet daily with 50 mg tablet  Screening cholesterol level  -     Lipid panel; Future    Encounter for screening mammogram for breast cancer  -     Mammo screening bilateral w cad; Future    Medication refill  -     sertraline (ZOLOFT) 50 mg tablet; Take 1 tablet daily with 100 mg tablet  Subjective:      Patient ID: Mirtha Patel is a 34 y o  female  68-year-old female presenting for follow-up of depression  Patient states that she ran out of medication about a week ago, had to go to the emergency room to get a refill for she can make it in to the office for her appointment  States all off the medication and she was having increased symptoms  Since she has been back on the medications states everything has been going well  Currently denies any anxiety or depression, or SI  States that the sertraline 150 mg daily has been helping with her symptoms  The following portions of the patient's history were reviewed and updated as appropriate:   She  has a past medical history of Abdominal pain; Arthritis; Hepatitis C; Pyelonephritis; Stargardt's disease; Torn ligament; and Wears glasses  She   Patient Active Problem List    Diagnosis Date Noted    Depression 2015     She  has a past surgical history that includes  section; Dental surgery; and pr esophagogastroduodenoscopy transoral diagnostic (N/A, 2016)  Her family history is not on file    She  reports that she has been smoking  She has a 25 00 pack-year smoking history  She has never used smokeless tobacco  She reports that she uses drugs  She reports that she does not drink alcohol  Current Outpatient Prescriptions   Medication Sig Dispense Refill    sertraline (ZOLOFT) 100 mg tablet Take 1 tablet daily with 50 mg tablet  90 tablet 1    sertraline (ZOLOFT) 50 mg tablet Take 1 tablet daily with 100 mg tablet  90 tablet 1    mesalamine (PENTASA) 250 mg CR capsule Take 500 mg by mouth 4 (four) times a day       No current facility-administered medications for this visit  She has No Known Allergies       Review of Systems   Constitutional: Negative for chills, fatigue and fever  Eyes: Negative for pain and visual disturbance  Respiratory: Negative for cough, shortness of breath and wheezing  Cardiovascular: Negative for chest pain, palpitations and leg swelling  Gastrointestinal: Negative for abdominal pain, blood in stool, constipation, diarrhea, nausea and vomiting  Endocrine: Negative for cold intolerance, heat intolerance and polyuria  Genitourinary: Negative for difficulty urinating, dysuria, frequency, hematuria and urgency  Musculoskeletal: Negative for arthralgias and myalgias  Neurological: Negative for dizziness, syncope, weakness, numbness and headaches  Psychiatric/Behavioral: Negative for agitation, dysphoric mood, sleep disturbance and suicidal ideas  The patient is not nervous/anxious  Objective:      /78   Pulse 92   Temp (!) 97 4 °F (36 3 °C) (Tympanic)   Resp 18   Ht 5' 9" (1 753 m)   Wt 76 7 kg (169 lb)   LMP 04/17/2018   SpO2 97%   BMI 24 96 kg/m²          Physical Exam   Constitutional: She is oriented to person, place, and time  She appears well-developed and well-nourished  No distress     HENT:   Right Ear: Hearing, tympanic membrane and ear canal normal    Left Ear: Hearing, tympanic membrane and ear canal normal    Mouth/Throat: Mucous membranes are normal    Eyes: Conjunctivae, EOM and lids are normal  Pupils are equal, round, and reactive to light  Right eye exhibits no discharge  Left eye exhibits no discharge  Neck: Normal range of motion  Neck supple  Cardiovascular: Normal rate, regular rhythm, normal heart sounds and normal pulses  Exam reveals no gallop and no friction rub  No murmur heard  Pulmonary/Chest: Effort normal and breath sounds normal  No respiratory distress  She has no wheezes  She has no rales  Abdominal: Soft  Normal appearance and bowel sounds are normal  She exhibits no distension  There is no tenderness  There is no rebound and no guarding  Musculoskeletal: She exhibits no edema  Lymphadenopathy:     She has no cervical adenopathy  Neurological: She is alert and oriented to person, place, and time  She has normal reflexes  No cranial nerve deficit  Skin: Skin is warm and dry  She is not diaphoretic  Psychiatric: She has a normal mood and affect  Her behavior is normal  Thought content normal    Nursing note and vitals reviewed

## 2018-10-04 ENCOUNTER — HOSPITAL ENCOUNTER (OUTPATIENT)
Dept: MAMMOGRAPHY | Facility: HOSPITAL | Age: 38
Discharge: HOME | End: 2018-10-04
Payer: COMMERCIAL

## 2018-10-04 DIAGNOSIS — Z12.31 ENCOUNTER FOR SCREENING MAMMOGRAM FOR BREAST CANCER: ICD-10-CM

## 2018-10-04 PROCEDURE — 77067 SCR MAMMO BI INCL CAD: CPT

## 2018-10-05 ENCOUNTER — TRANSCRIBE ORDERS (OUTPATIENT)
Dept: LAB | Age: 38
End: 2018-10-05

## 2018-10-08 ENCOUNTER — TRANSCRIBE ORDERS (OUTPATIENT)
Dept: ADMINISTRATIVE | Facility: HOSPITAL | Age: 38
End: 2018-10-08

## 2018-10-08 ENCOUNTER — APPOINTMENT (OUTPATIENT)
Dept: LAB | Facility: HOSPITAL | Age: 38
End: 2018-10-08

## 2018-10-08 DIAGNOSIS — Z52.4 KIDNEY DONOR: Primary | ICD-10-CM

## 2018-10-08 DIAGNOSIS — Z52.4 KIDNEY DONOR: ICD-10-CM

## 2018-10-08 LAB
ABO GROUP BLD: NORMAL
ALBUMIN SERPL BCP-MCNC: 3.9 G/DL (ref 3.5–5)
ALP SERPL-CCNC: 49 U/L (ref 46–116)
ALT SERPL W P-5'-P-CCNC: 17 U/L (ref 12–78)
ANION GAP SERPL CALCULATED.3IONS-SCNC: 8 MMOL/L (ref 4–13)
APTT PPP: 31 SECONDS (ref 24–36)
AST SERPL W P-5'-P-CCNC: 12 U/L (ref 5–45)
BACTERIA UR QL AUTO: ABNORMAL /HPF
BASOPHILS # BLD AUTO: 0.07 THOUSANDS/ΜL (ref 0–0.1)
BASOPHILS NFR BLD AUTO: 1 % (ref 0–1)
BILIRUB SERPL-MCNC: 0.28 MG/DL (ref 0.2–1)
BILIRUB UR QL STRIP: NEGATIVE
BUN SERPL-MCNC: 9 MG/DL (ref 5–25)
CALCIUM SERPL-MCNC: 9 MG/DL (ref 8.3–10.1)
CHLORIDE SERPL-SCNC: 105 MMOL/L (ref 100–108)
CHOLEST SERPL-MCNC: 174 MG/DL (ref 50–200)
CLARITY UR: CLEAR
CO2 SERPL-SCNC: 29 MMOL/L (ref 21–32)
COLOR UR: YELLOW
CREAT SERPL-MCNC: 0.72 MG/DL (ref 0.6–1.3)
CREAT SERPL-MCNC: 0.72 MG/DL (ref 0.6–1.3)
CREAT UR-MCNC: 249 MG/DL
EOSINOPHIL # BLD AUTO: 0.15 THOUSAND/ΜL (ref 0–0.61)
EOSINOPHIL NFR BLD AUTO: 3 % (ref 0–6)
ERYTHROCYTE [DISTWIDTH] IN BLOOD BY AUTOMATED COUNT: 13 % (ref 11.6–15.1)
EST. AVERAGE GLUCOSE BLD GHB EST-MCNC: 100 MG/DL
GFR SERPL CREATININE-BSD FRML MDRD: 105 ML/MIN/1.73SQ M
GLUCOSE P FAST SERPL-MCNC: 91 MG/DL (ref 65–99)
GLUCOSE UR STRIP-MCNC: NEGATIVE MG/DL
HBA1C MFR BLD: 5.1 % (ref 4.2–6.3)
HCT VFR BLD AUTO: 40.1 % (ref 34.8–46.1)
HDLC SERPL-MCNC: 69 MG/DL (ref 40–60)
HGB BLD-MCNC: 13.4 G/DL (ref 11.5–15.4)
HGB UR QL STRIP.AUTO: NEGATIVE
IMM GRANULOCYTES # BLD AUTO: 0.02 THOUSAND/UL (ref 0–0.2)
IMM GRANULOCYTES NFR BLD AUTO: 0 % (ref 0–2)
INR PPP: 0.97 (ref 0.86–1.17)
KETONES UR STRIP-MCNC: NEGATIVE MG/DL
LDLC SERPL CALC-MCNC: 97 MG/DL (ref 0–100)
LEUKOCYTE ESTERASE UR QL STRIP: NEGATIVE
LYMPHOCYTES # BLD AUTO: 1.26 THOUSANDS/ΜL (ref 0.6–4.47)
LYMPHOCYTES NFR BLD AUTO: 24 % (ref 14–44)
MAGNESIUM SERPL-MCNC: 1.9 MG/DL (ref 1.6–2.6)
MCH RBC QN AUTO: 30.6 PG (ref 26.8–34.3)
MCHC RBC AUTO-ENTMCNC: 33.4 G/DL (ref 31.4–37.4)
MCV RBC AUTO: 92 FL (ref 82–98)
MICROALBUMIN UR-MCNC: 15.9 MG/L (ref 0–20)
MICROALBUMIN/CREAT 24H UR: 6 MG/G CREATININE (ref 0–30)
MONOCYTES # BLD AUTO: 0.6 THOUSAND/ΜL (ref 0.17–1.22)
MONOCYTES NFR BLD AUTO: 12 % (ref 4–12)
MUCOUS THREADS UR QL AUTO: ABNORMAL
NEUTROPHILS # BLD AUTO: 3.13 THOUSANDS/ΜL (ref 1.85–7.62)
NEUTS SEG NFR BLD AUTO: 60 % (ref 43–75)
NITRITE UR QL STRIP: NEGATIVE
NON-SQ EPI CELLS URNS QL MICRO: ABNORMAL /HPF
NONHDLC SERPL-MCNC: 105 MG/DL
NRBC BLD AUTO-RTO: 0 /100 WBCS
PH UR STRIP.AUTO: 8 [PH] (ref 4.5–8)
PHOSPHATE SERPL-MCNC: 3.2 MG/DL (ref 2.7–4.5)
PLATELET # BLD AUTO: 257 THOUSANDS/UL (ref 149–390)
PMV BLD AUTO: 10 FL (ref 8.9–12.7)
POTASSIUM SERPL-SCNC: 4.2 MMOL/L (ref 3.5–5.3)
PROT SERPL-MCNC: 7.5 G/DL (ref 6.4–8.2)
PROT UR STRIP-MCNC: ABNORMAL MG/DL
PROT UR-MCNC: 8 MG/DL
PROTHROMBIN TIME: 13 SECONDS (ref 11.8–14.2)
RBC # BLD AUTO: 4.38 MILLION/UL (ref 3.81–5.12)
RBC #/AREA URNS AUTO: ABNORMAL /HPF
RH BLD: POSITIVE
SODIUM SERPL-SCNC: 142 MMOL/L (ref 136–145)
SP GR UR STRIP.AUTO: 1.02 (ref 1–1.03)
TRIGL SERPL-MCNC: 41 MG/DL
UROBILINOGEN UR QL STRIP.AUTO: 0.2 E.U./DL
WBC # BLD AUTO: 5.23 THOUSAND/UL (ref 4.31–10.16)
WBC #/AREA URNS AUTO: ABNORMAL /HPF

## 2018-10-08 PROCEDURE — 84156 ASSAY OF PROTEIN URINE: CPT

## 2018-10-08 PROCEDURE — 85610 PROTHROMBIN TIME: CPT

## 2018-10-08 PROCEDURE — 83735 ASSAY OF MAGNESIUM: CPT

## 2018-10-08 PROCEDURE — 84100 ASSAY OF PHOSPHORUS: CPT

## 2018-10-08 PROCEDURE — 81001 URINALYSIS AUTO W/SCOPE: CPT | Performed by: SURGERY

## 2018-10-08 PROCEDURE — 80061 LIPID PANEL: CPT

## 2018-10-08 PROCEDURE — 82575 CREATININE CLEARANCE TEST: CPT

## 2018-10-08 PROCEDURE — 85730 THROMBOPLASTIN TIME PARTIAL: CPT

## 2018-10-08 PROCEDURE — 85025 COMPLETE CBC W/AUTO DIFF WBC: CPT

## 2018-10-08 PROCEDURE — 86900 BLOOD TYPING SEROLOGIC ABO: CPT

## 2018-10-08 PROCEDURE — 36415 COLL VENOUS BLD VENIPUNCTURE: CPT

## 2018-10-08 PROCEDURE — 84156 ASSAY OF PROTEIN URINE: CPT | Performed by: SURGERY

## 2018-10-08 PROCEDURE — 86480 TB TEST CELL IMMUN MEASURE: CPT

## 2018-10-08 PROCEDURE — 86901 BLOOD TYPING SEROLOGIC RH(D): CPT

## 2018-10-08 PROCEDURE — 82570 ASSAY OF URINE CREATININE: CPT

## 2018-10-08 PROCEDURE — 82043 UR ALBUMIN QUANTITATIVE: CPT

## 2018-10-08 PROCEDURE — 80053 COMPREHEN METABOLIC PANEL: CPT

## 2018-10-08 PROCEDURE — 83036 HEMOGLOBIN GLYCOSYLATED A1C: CPT

## 2018-10-08 PROCEDURE — 82565 ASSAY OF CREATININE: CPT

## 2018-10-09 LAB
CREAT 24H UR-MRATE: 1.4 G/24HR (ref 0.6–1.8)
CREAT 24H UR-MRATE: 1.4 G/24HR (ref 0.6–1.8)
CREAT CL 24H UR+SERPL-VRATE: 125.06 ML/MIN (ref 75–115)
CREAT UR-MCNC: 55 MG/DL
GAMMA INTERFERON BACKGROUND BLD IA-ACNC: 0.1 IU/ML
M TB IFN-G BLD-IMP: NEGATIVE
M TB IFN-G CD4+ BCKGRND COR BLD-ACNC: -0.01 IU/ML
M TB IFN-G CD4+ BCKGRND COR BLD-ACNC: -0.01 IU/ML
MITOGEN IGNF BCKGRD COR BLD-ACNC: >10 IU/ML
PROT 24H UR-MCNC: <157.8 MG/24 HRS (ref 40–150)
SPECIMEN VOL UR: 2630 ML

## 2018-10-25 DIAGNOSIS — F32.A DEPRESSION, UNSPECIFIED DEPRESSION TYPE: ICD-10-CM

## 2018-10-25 DIAGNOSIS — Z76.0 MEDICATION REFILL: ICD-10-CM

## 2018-10-28 DIAGNOSIS — F32.A DEPRESSION, UNSPECIFIED DEPRESSION TYPE: ICD-10-CM

## 2018-10-28 DIAGNOSIS — Z76.0 MEDICATION REFILL: ICD-10-CM

## 2018-10-29 RX ORDER — SERTRALINE HYDROCHLORIDE 100 MG/1
TABLET, FILM COATED ORAL
Qty: 90 TABLET | Refills: 0 | Status: SHIPPED | OUTPATIENT
Start: 2018-10-29 | End: 2019-02-01 | Stop reason: SDUPTHER

## 2018-10-30 RX ORDER — SERTRALINE HYDROCHLORIDE 100 MG/1
TABLET, FILM COATED ORAL
Qty: 90 TABLET | Refills: 1 | OUTPATIENT
Start: 2018-10-30

## 2018-11-14 ENCOUNTER — HOSPITAL ENCOUNTER (EMERGENCY)
Facility: HOSPITAL | Age: 38
Discharge: HOME | End: 2018-11-14
Attending: EMERGENCY MEDICINE
Payer: COMMERCIAL

## 2018-11-14 VITALS
BODY MASS INDEX: 24.66 KG/M2 | HEART RATE: 95 BPM | OXYGEN SATURATION: 98 % | WEIGHT: 167 LBS | RESPIRATION RATE: 18 BRPM | SYSTOLIC BLOOD PRESSURE: 129 MMHG | DIASTOLIC BLOOD PRESSURE: 62 MMHG | TEMPERATURE: 97.9 F

## 2018-11-14 DIAGNOSIS — S93.402A SPRAIN OF LEFT ANKLE, UNSPECIFIED LIGAMENT, INITIAL ENCOUNTER: Primary | ICD-10-CM

## 2018-11-14 PROCEDURE — 99283 EMERGENCY DEPT VISIT LOW MDM: CPT

## 2018-11-15 NOTE — ED PROVIDER NOTES
History  Chief Complaint   Patient presents with    Ankle Pain     pt states left ankle has been hurting for approx 1 month  last week the pain became worse  pt states when she walks or bends it it feels like stabbing pain     Patient is a 17-year-old female presenting to the emergency department with left medial ankle pain  Patient reports the pain began approximately 1 month ago  She denies any known injury or trauma at that time  Patient reports worsening over the past week, noting sharp/shooting pain up the inner side of the ankle with walking  She denies any numbness or tingling in the foot  Denies any weakness  She does report some mild bruising and swelling in the area of tenderness as compared to her other foot  She denies any knee, hip, or back pain  Reports no fevers or chills  Otherwise reports no other symptoms  Denies any other concerns  Prior to Admission Medications   Prescriptions Last Dose Informant Patient Reported? Taking? mesalamine (PENTASA) 250 mg CR capsule   Yes No   Sig: Take 500 mg by mouth 4 (four) times a day   sertraline (ZOLOFT) 100 mg tablet   No No   Sig: Take 1 tablet daily with 50 mg tablet  sertraline (ZOLOFT) 50 mg tablet   No No   Sig: Take 1 tablet daily with 100 mg tablet        Facility-Administered Medications: None       Past Medical History:   Diagnosis Date    Abdominal pain     EGD today -7/2016    Abnormality of eyelid     LAST ASSESSED: [id number]    Arthritis     shoulders    Drug abuse ([facility] [address] )     Ganglion cyst of wrist, right     LAST ASSESSED: [id number]    Hepatitis C     dx 15 yrs ago-2015 retested told undetectable    Hepatitis C virus infection     LAST ASSESSED: [id number]    Hordeolum externum left eye, unspecified eyelid     LAST ASSESSED: [id number]    Pyelonephritis     pyelonephritis    Stargardt's disease     Stargardt's disease     Tear of right scapholunate ligament     LAST ASSESSED: [id number]    Torn ligament     R wrist    Wears glasses     reading       Past Surgical History:   Procedure Laterality Date     SECTION      LAST ASSESSED: [id number]    DENTAL SURGERY      INDUCED       SURIGCAL TREATMENT FOR     [place] ESOPHAGOGASTRODUODENOSCOPY TRANSORAL DIAGNOSTIC N/A 2016    Procedure: ESOPHAGOGASTRODUODENOSCOPY (EGD); Surgeon: Jesus Rangel MD;  Location: [facility]; Service: Gastroenterology       Family History   Problem Relation Age of Onset    Heart attack Mother     Cancer Mother         LEIOMYOSARCOMA    Seizures Father         EPILEPSY    Diabetes Brother      I have reviewed and agree with the history as documented  Social History   Substance Use Topics    Smoking status: Current Every Day Smoker     Packs/day: 1 00     Years: 25 00     Types: Cigarettes    Smokeless tobacco: Never Used    Alcohol use No      Comment: former ETOH abuse-per pt        Review of Systems   Constitutional: Negative for chills and fever  Respiratory: Negative for shortness of breath  Cardiovascular: Negative for chest pain  Gastrointestinal: Negative for nausea and vomiting  Musculoskeletal: Positive for arthralgias (Left ankle ) and joint swelling (  Left ankle  )  Negative for neck pain and neck stiffness  Skin: Negative for rash and wound  Allergic/Immunologic: Negative for immunocompromised state  Neurological: Negative for weakness and numbness  Hematological: Negative for adenopathy  Physical Exam  Physical Exam   Constitutional: She is oriented to person, place, and time  She appears well-developed and well-nourished  No distress  Eyes: Conjunctivae are normal    Neck: Neck supple  Cardiovascular: Normal rate and regular rhythm  Pulmonary/Chest: Effort normal  No respiratory distress  Musculoskeletal:        Left knee: Normal         Left ankle: She exhibits swelling (Mild ) and ecchymosis (Mild  )   She exhibits normal range of motion, no deformity, no laceration and normal pulse  Tenderness  Medial malleolus tenderness found  No lateral malleolus, no head of 5th metatarsal and no proximal fibula tenderness found  Achilles tendon exhibits no defect  Feet:    Neurological: She is alert and oriented to person, place, and time  Skin: Skin is warm and dry  Psychiatric: She has a normal mood and affect  Nursing note and vitals reviewed  Vital Signs  ED Triage Vitals [11/14/18 2139]   Temperature Pulse Respirations Blood Pressure SpO2   97 9 °F (36 6 °C) 95 18 129/62 98 %      Temp Source Heart Rate Source Patient Position - Orthostatic VS BP Location FiO2 (%)   Oral Monitor Sitting Left arm --      Pain Score       7           Vitals:    11/14/18 2139   BP: 129/62   Pulse: 95   Patient Position - Orthostatic VS: Sitting       Visual Acuity      ED Medications  Medications - No data to display    Diagnostic Studies  Results Reviewed     None                 No orders to display              Procedures  Procedures       Phone Contacts  ED Phone Contact    ED Course                               MDM  Number of Diagnoses or Management Options  Sprain of left ankle, unspecified ligament, initial encounter: new and does not require workup  Diagnosis management comments: Ankle pain for 1 month, worsening over the last week  There is no bony tenderness  No indication for x-ray  Ankle was Ace wrapped  Patient instructed to RICE the ankle  She was also given information for follow-up for orthopedics  Instructed to return to the ED with any worsening symptoms or emergent concerns      Patient Progress  Patient progress: stable    CritCare Time    Disposition  Final diagnoses:   Sprain of left ankle, unspecified ligament, initial encounter     Time reflects when diagnosis was documented in both MDM as applicable and the Disposition within this note     Time User Action Codes Description Comment    11/14/2018  9:57 PM Saul Quick Add [T42 364A] Sprain of left ankle, unspecified ligament, initial encounter       ED Disposition     ED Disposition Condition Comment    Discharge  Mirtha Patel discharge to home/self care  Condition at discharge: Stable        Follow-up Information     Follow up With Specialties Details Why Contact Info Additional Edvin Larios [address] Orthopedic Surgery Schedule an appointment as soon as possible for a visit  [place] [id number]  [facility], [address] [place] [address], [place], [place], [address]    Komal Howell PA-C Family Medicine, Physician Assistant  As needed [address] [facility],[address] [phone] [facility] Emergency Department Emergency Medicine  As needed, If symptoms worsen [address] [facility]  [phone] [place] [facility], [address] [facility] [place], [place], [address]          Patient's Medications   Discharge Prescriptions    No medications on file     No discharge procedures on file      ED Provider  Electronically Signed by           DENNISE Abad  11/14/18 1009

## 2018-11-15 NOTE — DISCHARGE INSTRUCTIONS

## 2018-11-17 ENCOUNTER — OFFICE VISIT (OUTPATIENT)
Dept: URGENT CARE | Age: 38
End: 2018-11-17
Payer: COMMERCIAL

## 2018-11-17 VITALS
RESPIRATION RATE: 16 BRPM | OXYGEN SATURATION: 98 % | HEART RATE: 77 BPM | TEMPERATURE: 98.4 F | HEIGHT: 69 IN | SYSTOLIC BLOOD PRESSURE: 135 MMHG | BODY MASS INDEX: 25.18 KG/M2 | WEIGHT: 170 LBS | DIASTOLIC BLOOD PRESSURE: 80 MMHG

## 2018-11-17 DIAGNOSIS — R21 RASH: Primary | ICD-10-CM

## 2018-11-17 PROCEDURE — 99213 OFFICE O/P EST LOW 20 MIN: CPT | Performed by: PHYSICIAN ASSISTANT

## 2018-11-17 NOTE — PROGRESS NOTES
[address]        NAME: Luciana Patel is a 34 y o  female  : 1980    MRN: [id number]  DATE: 2018  TIME: 6:57 PM    Assessment and Plan   Rash [R21]  1  Rash       Possible folliculitis  Rule out flea bites from pet dog  Patient Instructions     Avoid shaving area and change razor head  Inspect pet for fleas and bed sheets for drops of blood  Keep area clean and dry  Watch for signs of infection  May use OTC cortisone cream if itching occurs  Follow up with PCP in 3-5 days  Proceed to  ER if symptoms worsen  Chief Complaint     Chief Complaint   Patient presents with    Rash     b/l calves; scattered spots  Noticed today in shower  Not itchy; History of Present Illness       States she noticed bumps over her posterior aspect of her legs today while she was shaving  Rash   This is a new problem  The current episode started today  The problem is unchanged  Location: bilateral posterior aspect of legs  The rash is characterized by redness  Associated with: chihuahua sleeps in crook of her proximal legs  Pertinent negatives include no anorexia, congestion, cough, diarrhea, eye pain, facial edema, fatigue, fever, joint pain, nail changes, rhinorrhea, shortness of breath, sore throat or vomiting  Past treatments include nothing  There is no history of allergies, asthma, eczema or varicella  Review of Systems   Review of Systems   Constitutional: Negative for activity change, appetite change, chills, fatigue and fever  HENT: Negative for congestion, postnasal drip, rhinorrhea, sinus pain, sinus pressure, sneezing, sore throat and trouble swallowing  Eyes: Negative for pain  Respiratory: Negative for cough, chest tightness, shortness of breath and stridor  Cardiovascular: Negative for chest pain and palpitations  Gastrointestinal: Negative for abdominal pain, anorexia, diarrhea, nausea and vomiting     Musculoskeletal: Negative for arthralgias, joint pain and myalgias  Skin: Positive for rash  Negative for nail changes and wound  Current Medications       Current Outpatient Prescriptions:     mesalamine (PENTASA) 250 mg CR capsule, Take 500 mg by mouth 4 (four) times a day, Disp: , Rfl:     sertraline (ZOLOFT) 100 mg tablet, Take 1 tablet daily with 50 mg tablet , Disp: 90 tablet, Rfl: 0    sertraline (ZOLOFT) 50 mg tablet, Take 1 tablet daily with 100 mg tablet , Disp: 90 tablet, Rfl: 0    Current Allergies     Allergies as of 2018    (No Known Allergies)            The following portions of the patient's history were reviewed and updated as appropriate: allergies, current medications, past family history, past medical history, past social history, past surgical history and problem list      Past Medical History:   Diagnosis Date    Abdominal pain     EGD today -2016    Abnormality of eyelid     LAST ASSESSED: [id number]    Arthritis     shoulders    Drug abuse ([facility] [address] )     Ganglion cyst of wrist, right     LAST ASSESSED: [id number]    Hepatitis C     dx 15 yrs ago- retested told undetectable    Hepatitis C virus infection     LAST ASSESSED: [id number]    Hordeolum externum left eye, unspecified eyelid     LAST ASSESSED: [id number]    Pyelonephritis     pyelonephritis    Stargardt's disease     Stargardt's disease     Tear of right scapholunate ligament     LAST ASSESSED: [id number]    Torn ligament     R   wrist    Wears glasses     reading       Past Surgical History:   Procedure Laterality Date     SECTION      LAST ASSESSED: [id number]    DENTAL SURGERY      INDUCED       SURIGCAL TREATMENT FOR     MI ESOPHAGOGASTRODUODENOSCOPY TRANSORAL DIAGNOSTIC N/A 2016    Procedure: ESOPHAGOGASTRODUODENOSCOPY (EGD); Surgeon: Antonio Saavedra MD;  Location: [facility];   Service: Gastroenterology       Family History   Problem Relation Age of Onset    Heart attack Mother     Cancer Mother LEIOMYOSARCOMA    Seizures Father         EPILEPSY    Diabetes Brother          Medications have been verified  Objective   /80   Pulse 77   Temp 98 4 °F (36 9 °C)   Resp 16   Ht 5' 9" (1 753 m)   Wt 77 1 kg (170 lb)   LMP 10/30/2018 (Approximate)   SpO2 98%   BMI 25 10 kg/m²        Physical Exam     Physical Exam   Constitutional: She appears well-developed and well-nourished  No distress  HENT:   Mouth/Throat: Oropharynx is clear and moist    Cardiovascular: Normal rate, regular rhythm and normal heart sounds  Exam reveals no gallop and no friction rub  No murmur heard  Pulmonary/Chest: Effort normal and breath sounds normal  No respiratory distress  She has no wheezes  She has no rales  She exhibits no tenderness  Lymphadenopathy:     She has no cervical adenopathy  Neurological: She is alert  Skin: Skin is warm  Rash noted  No erythema  2-3 approximately 1mm scattered mildly erythematous papules over bilateral proximal posterior aspect of legs  No surrounding erythema or discharge  Psychiatric: She has a normal mood and affect   Her behavior is normal  Judgment and thought content normal

## 2018-11-17 NOTE — PATIENT INSTRUCTIONS
Avoid shaving area and change razor head  Inspect pet for fleas and bed sheets for drops of blood  Keep area clean and dry  Watch for signs of infection  May use OTC cortisone cream if itching occurs  Follow up with PCP in 3-5 days  Proceed to  ER if symptoms worsen

## 2018-11-29 ENCOUNTER — OFFICE VISIT (OUTPATIENT)
Dept: OBGYN CLINIC | Facility: MEDICAL CENTER | Age: 38
End: 2018-11-29
Payer: COMMERCIAL

## 2018-11-29 ENCOUNTER — APPOINTMENT (OUTPATIENT)
Dept: RADIOLOGY | Facility: CLINIC | Age: 38
End: 2018-11-29
Payer: COMMERCIAL

## 2018-11-29 VITALS
WEIGHT: 170 LBS | SYSTOLIC BLOOD PRESSURE: 116 MMHG | HEART RATE: 82 BPM | DIASTOLIC BLOOD PRESSURE: 79 MMHG | HEIGHT: 69 IN | BODY MASS INDEX: 25.18 KG/M2

## 2018-11-29 DIAGNOSIS — M25.572 PAIN OF JOINT OF LEFT ANKLE AND FOOT: Primary | ICD-10-CM

## 2018-11-29 DIAGNOSIS — M25.572 PAIN OF JOINT OF LEFT ANKLE AND FOOT: ICD-10-CM

## 2018-11-29 PROCEDURE — 73630 X-RAY EXAM OF FOOT: CPT

## 2018-11-29 PROCEDURE — 99203 OFFICE O/P NEW LOW 30 MIN: CPT | Performed by: EMERGENCY MEDICINE

## 2018-11-29 PROCEDURE — 73610 X-RAY EXAM OF ANKLE: CPT

## 2019-01-31 ENCOUNTER — TELEPHONE (OUTPATIENT)
Dept: FAMILY MEDICINE CLINIC | Facility: CLINIC | Age: 39
End: 2019-01-31

## 2019-01-31 NOTE — TELEPHONE ENCOUNTER
Pt has a pending appt on 2/13/19 with rr     Requesting refills until than     Zoloft 100mg       Correct pharmacy on file

## 2019-02-01 DIAGNOSIS — F32.A DEPRESSION, UNSPECIFIED DEPRESSION TYPE: ICD-10-CM

## 2019-02-01 DIAGNOSIS — Z76.0 MEDICATION REFILL: ICD-10-CM

## 2019-02-01 RX ORDER — SERTRALINE HYDROCHLORIDE 100 MG/1
TABLET, FILM COATED ORAL
Qty: 30 TABLET | Refills: 0 | Status: SHIPPED | OUTPATIENT
Start: 2019-02-01 | End: 2019-03-04 | Stop reason: SDUPTHER

## 2019-03-04 ENCOUNTER — OFFICE VISIT (OUTPATIENT)
Dept: FAMILY MEDICINE CLINIC | Facility: CLINIC | Age: 39
End: 2019-03-04
Payer: COMMERCIAL

## 2019-03-04 VITALS
OXYGEN SATURATION: 98 % | HEART RATE: 88 BPM | HEIGHT: 69 IN | BODY MASS INDEX: 25.42 KG/M2 | SYSTOLIC BLOOD PRESSURE: 118 MMHG | RESPIRATION RATE: 16 BRPM | TEMPERATURE: 98.7 F | DIASTOLIC BLOOD PRESSURE: 80 MMHG | WEIGHT: 171.6 LBS

## 2019-03-04 DIAGNOSIS — F78.A9: ICD-10-CM

## 2019-03-04 DIAGNOSIS — H35.53: ICD-10-CM

## 2019-03-04 DIAGNOSIS — Z23 IMMUNIZATION DUE: ICD-10-CM

## 2019-03-04 DIAGNOSIS — Z76.0 MEDICATION REFILL: ICD-10-CM

## 2019-03-04 DIAGNOSIS — Q04.0: ICD-10-CM

## 2019-03-04 DIAGNOSIS — K50.90 CROHN'S DISEASE WITHOUT COMPLICATION, UNSPECIFIED GASTROINTESTINAL TRACT LOCATION (HCC): ICD-10-CM

## 2019-03-04 DIAGNOSIS — F32.A DEPRESSION, UNSPECIFIED DEPRESSION TYPE: Primary | ICD-10-CM

## 2019-03-04 DIAGNOSIS — Q89.7: ICD-10-CM

## 2019-03-04 PROBLEM — K50.919 CROHN'S DISEASE WITH COMPLICATION (HCC): Status: ACTIVE | Noted: 2019-03-04

## 2019-03-04 PROBLEM — Z86.19 HISTORY OF HEPATITIS C: Status: ACTIVE | Noted: 2019-03-04

## 2019-03-04 PROBLEM — Z15.1: Status: ACTIVE | Noted: 2019-03-04

## 2019-03-04 PROBLEM — N28.1 RENAL CYST: Status: ACTIVE | Noted: 2017-08-22

## 2019-03-04 PROCEDURE — 90471 IMMUNIZATION ADMIN: CPT

## 2019-03-04 PROCEDURE — 99214 OFFICE O/P EST MOD 30 MIN: CPT | Performed by: PHYSICIAN ASSISTANT

## 2019-03-04 PROCEDURE — 90682 RIV4 VACC RECOMBINANT DNA IM: CPT

## 2019-03-04 RX ORDER — SERTRALINE HYDROCHLORIDE 100 MG/1
TABLET, FILM COATED ORAL
Qty: 90 TABLET | Refills: 1 | Status: SHIPPED | OUTPATIENT
Start: 2019-03-04 | End: 2019-08-29 | Stop reason: SDUPTHER

## 2019-03-04 RX ORDER — MESALAMINE 500 MG/1
1500 CAPSULE, EXTENDED RELEASE ORAL 4 TIMES DAILY
COMMUNITY
End: 2020-08-24

## 2019-03-04 NOTE — PROGRESS NOTES
Assessment/Plan:    Continue follow-up with GI specialist, Dr Nas Shaw at [facility]  Patient Instructions   Zoloft has been renewed  Refer to [facility] for sight for macular degeneration  Routine follow-up in 6 months  Recommend an appointment for a Medicare wellness physical exam    M*Modal software was used to dictate this note  It may contain errors with dictating incorrect words/spelling  Please contact provider directly for any questions  Diagnoses and all orders for this visit:    Depression, unspecified depression type  -     sertraline (ZOLOFT) 100 mg tablet; Take 1 tablet daily with 50 mg tablet  Medication refill  -     sertraline (ZOLOFT) 50 mg tablet; Take 1 tablet daily with 100 mg tablet  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, intellectual disability, and dysmorphic features [facility] )  -     Ambulatory referral to Ophthalmology; Future    Immunization due  -     PREFERRED: influenza vaccine, 2781-2245, quadrivalent, recombinant, PF, 0 5 mL (FLUBLOK)    Crohn's disease without complication, unspecified gastrointestinal tract location [facility])    Other orders  -     mesalamine (PENTASA) 500 mg CR capsule; Take 1,500 mg by mouth 4 (four) times a day          Subjective:      Patient ID: Mirtha Patel is a 37 y o  female  Patient presents today for routine follow-up for depression  She is transferring here from the [facility]  She has been on Zoloft 150 mg for at least 4 years  She states that her depression symptoms are stabilized on this medication  She denies any suicidal ideations  She also needs a phone number again for Ophthalmology for her macular degeneration  She did see the specialist at [facility] for sight but it has been several years since she was seen there  She continues to follow with the GI specialist for her Crohn's disease   She does see the specialist at [facility],   Quentin Dumont  The following portions of the patient's history were reviewed and updated as appropriate:   She  has a past medical history of Abdominal pain, Abnormality of eyelid, Arthritis, Drug abuse ([facility] [address] ), Ganglion cyst of wrist, right, Hepatitis C, Hepatitis C virus infection, Hordeolum externum left eye, unspecified eyelid, Pyelonephritis, Stargardt's disease, Stargardt's disease, Tear of right scapholunate ligament, Torn ligament, and Wears glasses  She   Patient Active Problem List    Diagnosis Date Noted    History of hepatitis C 2019    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, intellectual disability, and dysmorphic features ([facility] [address] ) 2019    Crohn's disease without complication ([facility] )     Renal cyst 2017    Depression 2015    History of heroin abuse 2015    Condyloma acuminata 2015    Hemorrhoid 2015     She  has a past surgical history that includes  section; Dental surgery; pr esophagogastroduodenoscopy transoral diagnostic (N/A, 2016); and Induced   Her family history includes Cancer in her mother; Diabetes in her brother; Heart attack in her mother; Seizures in her father  She  reports that she has been smoking cigarettes  She has a 25 00 pack-year smoking history  She has never used smokeless tobacco  She reports that she does not drink alcohol or use drugs  Current Outpatient Medications   Medication Sig Dispense Refill    mesalamine (PENTASA) 500 mg CR capsule Take 1,500 mg by mouth 4 (four) times a day      sertraline (ZOLOFT) 100 mg tablet Take 1 tablet daily with 50 mg tablet  90 tablet 1    sertraline (ZOLOFT) 50 mg tablet Take 1 tablet daily with 100 mg tablet  90 tablet 1     No current facility-administered medications for this visit        Current Outpatient Medications on File Prior to Visit   Medication Sig    mesalamine (PENTASA) 500 mg CR capsule Take 1,500 mg by mouth 4 (four) times a day    [DISCONTINUED] mesalamine (PENTASA) 250 mg CR capsule Take 500 mg by mouth 4 (four) times a day    [DISCONTINUED] sertraline (ZOLOFT) 100 mg tablet Take 1 tablet daily with 50 mg tablet   [DISCONTINUED] sertraline (ZOLOFT) 50 mg tablet Take 1 tablet daily with 100 mg tablet  No current facility-administered medications on file prior to visit  She has No Known Allergies       Review of Systems   Constitutional: Negative  Gastrointestinal: Negative for abdominal pain  As stated in HPI   Psychiatric/Behavioral:        As stated in HPI         Objective:      /80 (BP Location: Left arm, Patient Position: Sitting, Cuff Size: Standard)   Pulse 88   Temp 98 7 °F (37 1 °C) (Tympanic)   Resp 16   Ht 5' 9" (1 753 m)   Wt 77 8 kg (171 lb 9 6 oz)   SpO2 98%   BMI 25 34 kg/m²          Physical Exam   Constitutional: She appears well-developed and well-nourished  No distress  HENT:   Head: Normocephalic and atraumatic  Right Ear: External ear normal    Left Ear: External ear normal    Mouth/Throat: Oropharynx is clear and moist    Neck: Neck supple  No thyromegaly present  Cardiovascular: Normal rate, regular rhythm and normal heart sounds  Exam reveals no gallop and no friction rub  No murmur heard  Pulmonary/Chest: Effort normal and breath sounds normal  No respiratory distress  She has no wheezes  She has no rales  Abdominal: Soft  Bowel sounds are normal  She exhibits no mass  There is no tenderness  Musculoskeletal: She exhibits no deformity  Lymphadenopathy:     She has no cervical adenopathy  Neurological: She is alert  Skin: Skin is warm  Psychiatric: She has a normal mood and affect

## 2019-03-04 NOTE — PATIENT INSTRUCTIONS
Zoloft has been renewed  Refer to [facility] for sight for macular degeneration  Routine follow-up in 6 months  Recommend an appointment for a Medicare wellness physical exam

## 2019-04-08 ENCOUNTER — OFFICE VISIT (OUTPATIENT)
Dept: FAMILY MEDICINE CLINIC | Facility: CLINIC | Age: 39
End: 2019-04-08
Payer: COMMERCIAL

## 2019-04-08 VITALS
BODY MASS INDEX: 25.42 KG/M2 | HEART RATE: 88 BPM | RESPIRATION RATE: 16 BRPM | TEMPERATURE: 97.9 F | HEIGHT: 69 IN | OXYGEN SATURATION: 98 % | DIASTOLIC BLOOD PRESSURE: 80 MMHG | SYSTOLIC BLOOD PRESSURE: 118 MMHG | WEIGHT: 171.6 LBS

## 2019-04-08 DIAGNOSIS — Z76.0 MEDICATION REFILL: ICD-10-CM

## 2019-04-08 DIAGNOSIS — Z00.00 MEDICARE ANNUAL WELLNESS VISIT, INITIAL: Primary | ICD-10-CM

## 2019-04-08 DIAGNOSIS — F17.210 CIGARETTE NICOTINE DEPENDENCE WITHOUT COMPLICATION: ICD-10-CM

## 2019-04-08 DIAGNOSIS — H02.9 EYELID LESION, BENIGN: ICD-10-CM

## 2019-04-08 DIAGNOSIS — M25.562 ACUTE PAIN OF LEFT KNEE: ICD-10-CM

## 2019-04-08 PROCEDURE — G0438 PPPS, INITIAL VISIT: HCPCS | Performed by: PHYSICIAN ASSISTANT

## 2019-04-08 PROCEDURE — 99214 OFFICE O/P EST MOD 30 MIN: CPT | Performed by: PHYSICIAN ASSISTANT

## 2019-04-08 PROCEDURE — 3008F BODY MASS INDEX DOCD: CPT | Performed by: PHYSICIAN ASSISTANT

## 2019-04-08 RX ORDER — NICOTINE 21 MG/24HR
1 PATCH, TRANSDERMAL 24 HOURS TRANSDERMAL EVERY 24 HOURS
Qty: 14 PATCH | Refills: 2 | Status: SHIPPED | OUTPATIENT
Start: 2019-04-08 | End: 2019-04-12

## 2019-04-11 ENCOUNTER — TELEPHONE (OUTPATIENT)
Dept: FAMILY MEDICINE CLINIC | Facility: CLINIC | Age: 39
End: 2019-04-11

## 2019-04-12 DIAGNOSIS — F17.210 CIGARETTE NICOTINE DEPENDENCE WITHOUT COMPLICATION: Primary | ICD-10-CM

## 2019-04-18 ENCOUNTER — OFFICE VISIT (OUTPATIENT)
Dept: OBGYN CLINIC | Facility: CLINIC | Age: 39
End: 2019-04-18

## 2019-04-18 VITALS
BODY MASS INDEX: 24.87 KG/M2 | WEIGHT: 168.4 LBS | HEART RATE: 94 BPM | DIASTOLIC BLOOD PRESSURE: 77 MMHG | SYSTOLIC BLOOD PRESSURE: 120 MMHG

## 2019-04-18 DIAGNOSIS — Z72.51 HIGH RISK SEXUAL BEHAVIOR, UNSPECIFIED TYPE: ICD-10-CM

## 2019-04-18 DIAGNOSIS — Z01.419 ENCOUNTER FOR ANNUAL ROUTINE GYNECOLOGICAL EXAMINATION: Primary | ICD-10-CM

## 2019-04-18 DIAGNOSIS — Z12.31 ENCOUNTER FOR SCREENING MAMMOGRAM FOR MALIGNANT NEOPLASM OF BREAST: ICD-10-CM

## 2019-04-18 PROCEDURE — 87591 N.GONORRHOEAE DNA AMP PROB: CPT | Performed by: NURSE PRACTITIONER

## 2019-04-18 PROCEDURE — G0101 CA SCREEN;PELVIC/BREAST EXAM: HCPCS | Performed by: NURSE PRACTITIONER

## 2019-04-18 PROCEDURE — 87491 CHLMYD TRACH DNA AMP PROBE: CPT | Performed by: NURSE PRACTITIONER

## 2019-04-18 PROCEDURE — G0145 SCR C/V CYTO,THINLAYER,RESCR: HCPCS | Performed by: NURSE PRACTITIONER

## 2019-04-18 PROCEDURE — 87624 HPV HI-RISK TYP POOLED RSLT: CPT | Performed by: NURSE PRACTITIONER

## 2019-04-19 LAB
C TRACH DNA SPEC QL NAA+PROBE: NEGATIVE
HPV HR 12 DNA CVX QL NAA+PROBE: POSITIVE
HPV16 DNA CVX QL NAA+PROBE: NEGATIVE
HPV18 DNA CVX QL NAA+PROBE: NEGATIVE
N GONORRHOEA DNA SPEC QL NAA+PROBE: NEGATIVE

## 2019-04-22 ENCOUNTER — APPOINTMENT (OUTPATIENT)
Dept: LAB | Age: 39
End: 2019-04-22
Payer: COMMERCIAL

## 2019-04-22 DIAGNOSIS — Z72.51 HIGH RISK SEXUAL BEHAVIOR, UNSPECIFIED TYPE: ICD-10-CM

## 2019-04-22 PROCEDURE — 86592 SYPHILIS TEST NON-TREP QUAL: CPT

## 2019-04-22 PROCEDURE — 87389 HIV-1 AG W/HIV-1&-2 AB AG IA: CPT

## 2019-04-22 PROCEDURE — 87340 HEPATITIS B SURFACE AG IA: CPT

## 2019-04-22 PROCEDURE — 36415 COLL VENOUS BLD VENIPUNCTURE: CPT

## 2019-04-23 LAB
HBV SURFACE AG SER QL: NORMAL
RPR SER QL: NORMAL

## 2019-04-24 ENCOUNTER — EVALUATION (OUTPATIENT)
Dept: PHYSICAL THERAPY | Facility: CLINIC | Age: 39
End: 2019-04-24
Payer: COMMERCIAL

## 2019-04-24 DIAGNOSIS — M25.562 ACUTE PAIN OF LEFT KNEE: Primary | ICD-10-CM

## 2019-04-24 LAB
HIV 1+2 AB+HIV1 P24 AG SERPL QL IA: NORMAL
LAB AP GYN PRIMARY INTERPRETATION: NORMAL
Lab: NORMAL

## 2019-04-24 PROCEDURE — 97161 PT EVAL LOW COMPLEX 20 MIN: CPT | Performed by: PHYSICAL THERAPIST

## 2019-04-25 PROBLEM — R87.810 CERVICAL HIGH RISK HPV (HUMAN PAPILLOMAVIRUS) TEST POSITIVE: Status: ACTIVE | Noted: 2019-04-25

## 2019-04-26 ENCOUNTER — TELEPHONE (OUTPATIENT)
Dept: OBGYN CLINIC | Facility: CLINIC | Age: 39
End: 2019-04-26

## 2019-04-29 ENCOUNTER — TELEPHONE (OUTPATIENT)
Dept: OBGYN CLINIC | Facility: CLINIC | Age: 39
End: 2019-04-29

## 2019-04-29 ENCOUNTER — OFFICE VISIT (OUTPATIENT)
Dept: PHYSICAL THERAPY | Facility: CLINIC | Age: 39
End: 2019-04-29
Payer: COMMERCIAL

## 2019-04-29 DIAGNOSIS — M25.562 ACUTE PAIN OF LEFT KNEE: Primary | ICD-10-CM

## 2019-04-29 PROCEDURE — 97112 NEUROMUSCULAR REEDUCATION: CPT | Performed by: PHYSICAL THERAPIST

## 2019-04-29 PROCEDURE — 97110 THERAPEUTIC EXERCISES: CPT | Performed by: PHYSICAL THERAPIST

## 2019-04-29 PROCEDURE — 97010 HOT OR COLD PACKS THERAPY: CPT | Performed by: PHYSICAL THERAPIST

## 2019-04-29 PROCEDURE — 97140 MANUAL THERAPY 1/> REGIONS: CPT | Performed by: PHYSICAL THERAPIST

## 2019-07-08 ENCOUNTER — TELEPHONE (OUTPATIENT)
Dept: FAMILY MEDICINE CLINIC | Facility: CLINIC | Age: 39
End: 2019-07-08

## 2019-07-08 DIAGNOSIS — H10.9 BACTERIAL CONJUNCTIVITIS: Primary | ICD-10-CM

## 2019-07-08 RX ORDER — OFLOXACIN 3 MG/ML
1 SOLUTION/ DROPS OPHTHALMIC 4 TIMES DAILY
Qty: 5 ML | Refills: 0 | Status: SHIPPED | OUTPATIENT
Start: 2019-07-08 | End: 2019-08-29

## 2019-07-08 NOTE — TELEPHONE ENCOUNTER
Since her daughter was diagnosed last wee with pink eye I did send ofloxacin eye drops to the pharmacy  She should be seen if not better in 2-3 days or Sx worsen  Also apply warm compresses 3 times per day for 10 minutes

## 2019-07-08 NOTE — TELEPHONE ENCOUNTER
Pt called with pink eye that her daughter (goes to [employer]) had last week, [place] so hoping you can just call her in drops  If needs an appt please call today or let her know if drops were just called in    [facility] in [address]

## 2019-07-24 ENCOUNTER — OFFICE VISIT (OUTPATIENT)
Dept: URGENT CARE | Age: 39
End: 2019-07-24
Payer: COMMERCIAL

## 2019-07-24 VITALS
SYSTOLIC BLOOD PRESSURE: 123 MMHG | RESPIRATION RATE: 16 BRPM | HEIGHT: 69 IN | DIASTOLIC BLOOD PRESSURE: 90 MMHG | BODY MASS INDEX: 24.88 KG/M2 | HEART RATE: 93 BPM | TEMPERATURE: 98 F | WEIGHT: 168 LBS | OXYGEN SATURATION: 99 %

## 2019-07-24 DIAGNOSIS — M54.5 ACUTE LOW BACK PAIN, UNSPECIFIED BACK PAIN LATERALITY, WITH SCIATICA PRESENCE UNSPECIFIED: Primary | ICD-10-CM

## 2019-07-24 DIAGNOSIS — Z72.51 UNPROTECTED SEX: ICD-10-CM

## 2019-07-24 LAB
SL AMB  POCT GLUCOSE, UA: NEGATIVE
SL AMB LEUKOCYTE ESTERASE,UA: ABNORMAL
SL AMB POCT BILIRUBIN,UA: NEGATIVE
SL AMB POCT BLOOD,UA: ABNORMAL
SL AMB POCT CLARITY,UA: ABNORMAL
SL AMB POCT COLOR,UA: YELLOW
SL AMB POCT KETONES,UA: NEGATIVE
SL AMB POCT NITRITE,UA: NEGATIVE
SL AMB POCT PH,UA: 7
SL AMB POCT SPECIFIC GRAVITY,UA: 1.01
SL AMB POCT URINE PROTEIN: NEGATIVE
SL AMB POCT UROBILINOGEN: 0.2

## 2019-07-24 PROCEDURE — 81002 URINALYSIS NONAUTO W/O SCOPE: CPT | Performed by: NURSE PRACTITIONER

## 2019-07-24 PROCEDURE — 99213 OFFICE O/P EST LOW 20 MIN: CPT | Performed by: NURSE PRACTITIONER

## 2019-07-24 PROCEDURE — 87591 N.GONORRHOEAE DNA AMP PROB: CPT | Performed by: NURSE PRACTITIONER

## 2019-07-24 PROCEDURE — 87086 URINE CULTURE/COLONY COUNT: CPT | Performed by: NURSE PRACTITIONER

## 2019-07-24 PROCEDURE — 87491 CHLMYD TRACH DNA AMP PROBE: CPT | Performed by: NURSE PRACTITIONER

## 2019-07-24 RX ORDER — SULFAMETHOXAZOLE AND TRIMETHOPRIM 800; 160 MG/1; MG/1
1 TABLET ORAL EVERY 12 HOURS SCHEDULED
Qty: 14 TABLET | Refills: 0 | Status: SHIPPED | OUTPATIENT
Start: 2019-07-24 | End: 2019-07-31

## 2019-07-24 NOTE — PROGRESS NOTES
NAME: Mirtha Patel is a 37 y o  female  : 1980    MRN: [id number]      Assessment and Plan   Acute low back pain, unspecified back pain laterality, with sciatica presence unspecified [M54 5]  1  Acute low back pain, unspecified back pain laterality, with sciatica presence unspecified  POCT urine dip    Urine culture    Chlamydia/GC amplified DNA by PCR    sulfamethoxazole-trimethoprim (BACTRIM DS) 800-160 mg per tablet   2  Unprotected sex  Chlamydia/GC amplified DNA by PCR       Mirtha was seen today for possible uti  Diagnoses and all orders for this visit:    Acute low back pain, unspecified back pain laterality, with sciatica presence unspecified  -     POCT urine dip  -     Urine culture  -     Chlamydia/GC amplified DNA by PCR  -     sulfamethoxazole-trimethoprim (BACTRIM DS) 800-160 mg per tablet; Take 1 tablet by mouth every 12 (twelve) hours for 7 days    Unprotected sex  -     Chlamydia/GC amplified DNA by PCR        Patient Instructions   Patient Instructions   Follow up with pcp  Take meds as directed  If symptoms worsen go to the ER  Take motrin for pain may use tylenol    Azo OTC for pain   Changes urine orange, dont wear contacts     Proceed to ER if symptoms worsen  Chief Complaint     Chief Complaint   Patient presents with    Possible UTI     Pt c/o low back pain since this morning  History of Present Illness     64-year-old female here today with UTI symptoms  She awoke this morning with having low pain and the symptoms have been present for the past 4 months  She has pain in the low back area on the left side more than the right  NO injury or trauma noted  Pt has done stretches and taken motrin and no change in symptoms  Pain is sharp  Pt did have pylenonephritis in the past many years ago and these symptoms feel different to her        Review of Systems   Review of Systems   Constitutional: Negative  HENT: Negative  Respiratory: Negative      Cardiovascular: Negative  Gastrointestinal: Positive for abdominal pain  Negative for abdominal distention, anal bleeding, blood in stool, constipation, diarrhea, nausea, rectal pain and vomiting  Genitourinary: Negative  Musculoskeletal: Positive for back pain (low back pain)  Skin: Negative  Neurological: Negative for dizziness  Psychiatric/Behavioral: Negative for agitation           Current Medications       Current Outpatient Medications:     mesalamine (PENTASA) 500 mg CR capsule, Take 1,500 mg by mouth 4 (four) times a day, Disp: , Rfl:     sertraline (ZOLOFT) 100 mg tablet, Take 1 tablet daily with 50 mg tablet , Disp: 90 tablet, Rfl: 1    sertraline (ZOLOFT) 50 mg tablet, Take 1 tablet daily with 100 mg tablet , Disp: 20 tablet, Rfl: 0    nicotine (NICOTROL) 10 MG inhaler, Inhale 1 puff as needed for smoking cessation (Patient not taking: Reported on 4/18/2019), Disp: 42 each, Rfl: 2    ofloxacin (OCUFLOX) 0 3 % ophthalmic solution, Administer 1 drop to the right eye 4 (four) times a day (Patient not taking: Reported on 7/24/2019), Disp: 5 mL, Rfl: 0    sulfamethoxazole-trimethoprim (BACTRIM DS) 800-160 mg per tablet, Take 1 tablet by mouth every 12 (twelve) hours for 7 days, Disp: 14 tablet, Rfl: 0    Current Allergies     Allergies as of 07/24/2019    (No Known Allergies)              Past Medical History:   Diagnosis Date    Abdominal pain     EGD today -7/2016    Abnormality of eyelid     LAST ASSESSED: [id number]    Arthritis     shoulders    Drug abuse ([facility] [address] )     Ganglion cyst of wrist, right     LAST ASSESSED: [id number]    Hepatitis C     dx 15 yrs ago-2015 retested told undetectable    Hepatitis C virus infection     LAST ASSESSED: [id number]    Hordeolum externum left eye, unspecified eyelid     LAST ASSESSED: [id number]    Pyelonephritis     pyelonephritis    Stargardt's disease     Stargardt's disease     Tear of right scapholunate ligament     LAST ASSESSED: [id number]    Torn ligament     R   wrist    Wears glasses     reading       Past Surgical History:   Procedure Laterality Date     SECTION      LAST ASSESSED: [id number]    DENTAL SURGERY      INDUCED       SURIGCAL TREATMENT FOR     ME ESOPHAGOGASTRODUODENOSCOPY TRANSORAL DIAGNOSTIC N/A 2016    Procedure: ESOPHAGOGASTRODUODENOSCOPY (EGD); Surgeon: Sesar Romero MD;  Location: [facility]; Service: Gastroenterology       Family History   Problem Relation Age of Onset    Heart attack Mother     Cancer Mother         LEIOMYOSARCOMA    Seizures Father         EPILEPSY    Diabetes Brother          Medications have been verified  The following portions of the patient's history were reviewed and updated as appropriate: allergies, current medications, past family history, past medical history, past social history, past surgical history and problem list     Objective   /90   Pulse 93   Temp 98 °F (36 7 °C) (Tympanic)   Resp 16   Ht 5' 9" (1 753 m)   Wt 76 2 kg (168 lb)   LMP 07/10/2019   SpO2 99%   BMI 24 81 kg/m²      Physical Exam     Physical Exam   Constitutional: She is oriented to person, place, and time  She appears well-developed and well-nourished  Neck: No JVD present  Carotid bruit is not present  Cardiovascular: Normal rate, regular rhythm and normal heart sounds  Pulmonary/Chest: Effort normal and breath sounds normal  No stridor  She has no decreased breath sounds  Abdominal: Soft  Normal appearance and bowel sounds are normal  There is no tenderness  There is no rigidity, no rebound, no guarding and no CVA tenderness  Neurological: She is alert and oriented to person, place, and time  She is not disoriented         Latosha Siad, CRNP

## 2019-07-24 NOTE — PATIENT INSTRUCTIONS
Follow up with pcp  Take meds as directed  If symptoms worsen go to the ER  Take motrin for pain may use tylenol    Azo OTC for pain   Changes urine orange, dont wear contacts

## 2019-07-25 LAB
BACTERIA UR CULT: NORMAL
C TRACH DNA SPEC QL NAA+PROBE: NEGATIVE
N GONORRHOEA DNA SPEC QL NAA+PROBE: NEGATIVE

## 2019-07-26 ENCOUNTER — TELEPHONE (OUTPATIENT)
Dept: URGENT CARE | Age: 39
End: 2019-07-26

## 2019-07-26 NOTE — TELEPHONE ENCOUNTER
Patient called for test results  I educated patient that the results were negative  Patient states urinary symptoms have resolved with the antibiotic, she still has mild back pain  I told patient to follow up with her PCP for further evaluation of back pain  She states she understands and agrees

## 2019-07-29 ENCOUNTER — TELEPHONE (OUTPATIENT)
Dept: FAMILY MEDICINE CLINIC | Facility: CLINIC | Age: 39
End: 2019-07-29

## 2019-07-29 NOTE — TELEPHONE ENCOUNTER
Pt l/m stating she is on bactrim x 4 days and starting with red pimple like rash on hips, inner thighs  Returned call, l/m for pt to stop bactrim for now and call office to sched appt  To have rash eval and see if alternative abx is needed for her UTI

## 2019-08-26 PROBLEM — R53.83 FATIGUE: Status: ACTIVE | Noted: 2018-01-23

## 2019-08-29 ENCOUNTER — OFFICE VISIT (OUTPATIENT)
Dept: FAMILY MEDICINE CLINIC | Facility: CLINIC | Age: 39
End: 2019-08-29
Payer: COMMERCIAL

## 2019-08-29 VITALS
DIASTOLIC BLOOD PRESSURE: 78 MMHG | TEMPERATURE: 99.1 F | HEART RATE: 83 BPM | RESPIRATION RATE: 16 BRPM | BODY MASS INDEX: 25.09 KG/M2 | WEIGHT: 169.4 LBS | OXYGEN SATURATION: 97 % | SYSTOLIC BLOOD PRESSURE: 122 MMHG | HEIGHT: 69 IN

## 2019-08-29 DIAGNOSIS — H35.53: ICD-10-CM

## 2019-08-29 DIAGNOSIS — M54.2 NECK PAIN: ICD-10-CM

## 2019-08-29 DIAGNOSIS — R53.83 FATIGUE, UNSPECIFIED TYPE: ICD-10-CM

## 2019-08-29 DIAGNOSIS — E66.3 OVERWEIGHT: ICD-10-CM

## 2019-08-29 DIAGNOSIS — R29.3 POOR POSTURE: ICD-10-CM

## 2019-08-29 DIAGNOSIS — Q89.7: ICD-10-CM

## 2019-08-29 DIAGNOSIS — Q04.0: ICD-10-CM

## 2019-08-29 DIAGNOSIS — Z13.220 LIPID SCREENING: ICD-10-CM

## 2019-08-29 DIAGNOSIS — F78.A9: ICD-10-CM

## 2019-08-29 DIAGNOSIS — F32.A DEPRESSION, UNSPECIFIED DEPRESSION TYPE: Primary | ICD-10-CM

## 2019-08-29 DIAGNOSIS — Z76.0 MEDICATION REFILL: ICD-10-CM

## 2019-08-29 DIAGNOSIS — G43.011 INTRACTABLE MIGRAINE WITHOUT AURA AND WITH STATUS MIGRAINOSUS: ICD-10-CM

## 2019-08-29 PROCEDURE — 99214 OFFICE O/P EST MOD 30 MIN: CPT | Performed by: PHYSICIAN ASSISTANT

## 2019-08-29 RX ORDER — SERTRALINE HYDROCHLORIDE 100 MG/1
TABLET, FILM COATED ORAL
Qty: 90 TABLET | Refills: 1 | Status: SHIPPED | OUTPATIENT
Start: 2019-08-29 | End: 2020-03-17 | Stop reason: SDUPTHER

## 2019-08-29 RX ORDER — CYCLOBENZAPRINE HCL 10 MG
10 TABLET ORAL 3 TIMES DAILY PRN
Qty: 30 TABLET | Refills: 1 | Status: SHIPPED | OUTPATIENT
Start: 2019-08-29 | End: 2020-03-17

## 2019-08-29 NOTE — PROGRESS NOTES
Assessment/Plan:    BMI Counseling: Body mass index is 25 02 kg/m²  Discussed the patient's BMI with her  The BMI is above average  BMI counseling and education was provided to the patient  Nutrition recommendations include reducing portion sizes, decreasing overall calorie intake, 3-5 servings of fruits/vegetables daily and decreasing soda and/or juice intake  Exercise recommendations include exercising 3-5 times per week  encouraged to eliminate cream and sugar in coffee  Encouraged to walk at least 3-5 times weekly for 20 minutes    -recommend physical therapy for headaches, poor posture, neck pain  Follow-up if there is no improvement in 6-8 weeks  The importance of doing these exercises at home even when discharge has been discussed  -Take cyclobenzaprine as needed as directed  Avoid if driving due to the dry side effects  -apply heat to neck 3 times daily for 10 minutes as needed for neck pain  -proceed with fasting blood work  -continue follow-up with the specialist for your eyes as directed  -continue Zoloft as directed 150 mg daily  -routine follow-up in January 2020       Diagnoses and all orders for this visit:    Depression, unspecified depression type  -     sertraline (ZOLOFT) 100 mg tablet; Take 1 tablet daily with 50 mg tablet  -     CBC and differential  -     Lipid panel  -     TSH, 3rd generation with Free T4 reflex    Intractable migraine without aura and with status migrainosus  -     Ambulatory referral to Physical Therapy; Future  -     CBC and differential  -     TSH, 3rd generation with Free T4 reflex  -     cyclobenzaprine (FLEXERIL) 10 mg tablet;  Take 1 tablet (10 mg total) by mouth 3 (three) times a day as needed for muscle spasms    Fatigue, unspecified type  -     CBC and differential  -     TSH, 3rd generation with Free T4 reflex    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, intellectual disability, and dysmorphic features ([facility] [address] )    Overweight    Medication refill  -     sertraline (ZOLOFT) 50 mg tablet; Take 1 tablet daily with 100 mg tablet  Neck pain  -     Ambulatory referral to Physical Therapy; Future  -     cyclobenzaprine (FLEXERIL) 10 mg tablet; Take 1 tablet (10 mg total) by mouth 3 (three) times a day as needed for muscle spasms    Poor posture  -     Ambulatory referral to Physical Therapy; Future    Lipid screening  -     Comprehensive metabolic panel  -     Lipid panel          Subjective:      Patient ID: Mirtha Patel is a 37 y o  female  Patient presents today for routine follow-up for her depression  She states she is doing really well on the Zoloft 150 mg daily  She states that she did try weaning herself off the medication at sometime in the past but was not able to because she became symptomatic with her depression  Patient also states that she has been feeling very tired lately  She has been falling asleep regularly on the sofa  She has not been getting the best rest   She has also been noticing headaches at least almost on a daily basis  She denies having headache on 1 side  She denies any visual changes although she does see a specialist for spots/macular degeneration  She states there is a possible cure  She is going to see the specialist, Dr Lanre Valderrama at the [facility]  She denies any nausea, vomiting with her headaches  She does notice photophobia and phonophobia  At time she wakes up with a headache  Most the time she develops a headache by the end of the day  She has been having headaches off and on like this for the past 4 years  She does get some neck discomfort and tightness  She has never had treatment for this  She denies any jaw pain        The following portions of the patient's history were reviewed and updated as appropriate:   She  has a past medical history of Abdominal pain, Abnormality of eyelid, Arthritis, Drug abuse ([facility] [address] ), Ganglion cyst of wrist, right, Hepatitis C, Hepatitis C virus infection, Hordeolum externum left eye, unspecified eyelid, Pyelonephritis, Stargardt's disease, Stargardt's disease, Tear of right scapholunate ligament, Torn ligament, and Wears glasses  She   Patient Active Problem List    Diagnosis Date Noted    Intractable migraine without aura and with status migrainosus 2019    Overweight 2019    Neck pain 2019    Poor posture 2019    Lipid screening 2019    Bacterial conjunctivitis 2019    Cervical high risk HPV (human papillomavirus) test positive 2019    Encounter for annual routine gynecological examination 2019    Encounter for screening mammogram for malignant neoplasm of breast 2019    Cigarette nicotine dependence without complication     Medicare annual wellness visit, initial 2019    Eyelid lesion, benign 2019    Acute pain of left knee 2019    History of hepatitis C 2019    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, intellectual disability, and dysmorphic features ([facility] [address] ) 2019    [facility]     Fatigue 2018    Renal cyst 2017    Headache 2015    Depression 2015    History of heroin abuse 2015    Condyloma acuminata 2015    Hemorrhoid 2015     She  has a past surgical history that includes  section; Dental surgery; pr esophagogastroduodenoscopy transoral diagnostic (N/A, 2016); and Induced   Her family history includes Cancer in her mother; Diabetes in her brother; Heart attack in her mother; Seizures in her father  She  reports that she has been smoking cigarettes  She has a 25 00 pack-year smoking history  She has never used smokeless tobacco  She reports that she does not drink alcohol or use drugs    Current Outpatient Medications   Medication Sig Dispense Refill    mesalamine (PENTASA) 500 mg CR capsule Take 1,500 mg by mouth 4 (four) times a day      sertraline (ZOLOFT) 100 mg tablet Take 1 tablet daily with 50 mg tablet  90 tablet 1    sertraline (ZOLOFT) 50 mg tablet Take 1 tablet daily with 100 mg tablet  90 tablet 1    cyclobenzaprine (FLEXERIL) 10 mg tablet Take 1 tablet (10 mg total) by mouth 3 (three) times a day as needed for muscle spasms 30 tablet 1     No current facility-administered medications for this visit  Current Outpatient Medications on File Prior to Visit   Medication Sig    mesalamine (PENTASA) 500 mg CR capsule Take 1,500 mg by mouth 4 (four) times a day    [DISCONTINUED] sertraline (ZOLOFT) 100 mg tablet Take 1 tablet daily with 50 mg tablet   [DISCONTINUED] sertraline (ZOLOFT) 50 mg tablet Take 1 tablet daily with 100 mg tablet   [DISCONTINUED] nicotine (NICOTROL) 10 MG inhaler Inhale 1 puff as needed for smoking cessation (Patient not taking: Reported on 4/18/2019)    [DISCONTINUED] ofloxacin (OCUFLOX) 0 3 % ophthalmic solution Administer 1 drop to the right eye 4 (four) times a day (Patient not taking: Reported on 7/24/2019)     No current facility-administered medications on file prior to visit  She has No Known Allergies       Review of Systems   Constitutional: Positive for fatigue  Negative for chills, fever and unexpected weight change  HENT: Negative for congestion  Gastrointestinal: Negative for nausea and vomiting  Musculoskeletal:        As stated in HPI   Neurological:        As stated in HPI   Psychiatric/Behavioral:        As stated in HPI         Objective:      /78   Pulse 83   Temp 99 1 °F (37 3 °C) (Tympanic)   Resp 16   Ht 5' 9" (1 753 m)   Wt 76 8 kg (169 lb 6 4 oz)   SpO2 97%   BMI 25 02 kg/m²          Physical Exam   Constitutional: She appears well-developed and well-nourished  No distress  HENT:   Head: Normocephalic and atraumatic     Right Ear: External ear normal    Left Ear: External ear normal    Nose: Nose normal    Mouth/Throat: Oropharynx is clear and moist    Eyes: Pupils are equal, round, and reactive to light  Neck: Neck supple  No thyromegaly present  Cardiovascular: Normal rate, regular rhythm and normal heart sounds  Exam reveals no gallop and no friction rub  No murmur heard  Pulmonary/Chest: Effort normal and breath sounds normal  No respiratory distress  She has no wheezes  She has no rales  Abdominal: Soft  Bowel sounds are normal  She exhibits no mass  There is no tenderness  Musculoskeletal: She exhibits no deformity  Cervical spine:  She does have a forward posture on the right side  No specific tenderness, she does have reduced forward flexion and mild reduced rotation  Reduced side to side bending  Lymphadenopathy:     She has no cervical adenopathy  Neurological: She is alert  Skin: Skin is warm  Psychiatric: She has a normal mood and affect

## 2019-09-25 ENCOUNTER — TELEPHONE (OUTPATIENT)
Dept: FAMILY MEDICINE CLINIC | Facility: CLINIC | Age: 39
End: 2019-09-25

## 2019-09-25 DIAGNOSIS — G43.011 INTRACTABLE MIGRAINE WITHOUT AURA AND WITH STATUS MIGRAINOSUS: ICD-10-CM

## 2019-09-25 DIAGNOSIS — M54.2 NECK PAIN: ICD-10-CM

## 2019-09-26 NOTE — TELEPHONE ENCOUNTER
Pt states she did not ask for refill of cyclobenzaprine  She was given that medication for her headaches and only took 1 tab   She d//c after that 1 tab because she did not like how she felt  She received msg for pharmacy to  rx, which she thought was her Zoloft but it was cyclobenzaprine, now she has 2 bottles and does not use the medication  I informed pt I will call [facility]  and have them d/c cyclobenzaprine  I called [facility] and spoke with pharmacist and informed to d/c med pt no longer wants med

## 2019-09-26 NOTE — TELEPHONE ENCOUNTER
Pt last seen 8/29/19  Requesting cyclobenzaprine refill as per Vanessa's message  Pt was given #30 with 1 refill on 8/29/19  L/m for pt to call  Did she use that refill?

## 2019-10-15 ENCOUNTER — TELEPHONE (OUTPATIENT)
Dept: FAMILY MEDICINE CLINIC | Facility: CLINIC | Age: 39
End: 2019-10-15

## 2019-10-21 ENCOUNTER — CLINICAL SUPPORT (OUTPATIENT)
Dept: FAMILY MEDICINE CLINIC | Facility: CLINIC | Age: 39
End: 2019-10-21
Payer: COMMERCIAL

## 2019-10-21 DIAGNOSIS — Z11.1 PPD SCREENING TEST: Primary | ICD-10-CM

## 2019-10-21 PROCEDURE — 86580 TB INTRADERMAL TEST: CPT

## 2019-10-23 ENCOUNTER — OFFICE VISIT (OUTPATIENT)
Dept: FAMILY MEDICINE CLINIC | Facility: CLINIC | Age: 39
End: 2019-10-23
Payer: COMMERCIAL

## 2019-10-23 VITALS
HEIGHT: 69 IN | HEART RATE: 79 BPM | DIASTOLIC BLOOD PRESSURE: 70 MMHG | BODY MASS INDEX: 25.18 KG/M2 | OXYGEN SATURATION: 98 % | RESPIRATION RATE: 16 BRPM | TEMPERATURE: 97.9 F | SYSTOLIC BLOOD PRESSURE: 106 MMHG | WEIGHT: 170 LBS

## 2019-10-23 DIAGNOSIS — J06.9 ACUTE UPPER RESPIRATORY INFECTION: Primary | ICD-10-CM
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
## 2019-10-23 DIAGNOSIS — Z23 IMMUNIZATION DUE: ICD-10-CM

## 2019-10-23 DIAGNOSIS — Z23 NEED FOR INFLUENZA VACCINATION: ICD-10-CM

## 2019-10-23 DIAGNOSIS — S61.452A DOG BITE OF LEFT HAND, INITIAL ENCOUNTER: ICD-10-CM

## 2019-10-23 LAB
INDURATION: 0 MM
TB SKIN TEST: NEGATIVE

## 2019-10-23 PROCEDURE — 99214 OFFICE O/P EST MOD 30 MIN: CPT

## 2019-10-23 PROCEDURE — 3008F BODY MASS INDEX DOCD: CPT | Performed by: PHYSICIAN ASSISTANT

## 2019-10-23 PROCEDURE — 90686 IIV4 VACC NO PRSV 0.5 ML IM: CPT

## 2019-10-23 PROCEDURE — G0008 ADMIN INFLUENZA VIRUS VAC: HCPCS

## 2019-10-23 PROCEDURE — 90715 TDAP VACCINE 7 YRS/> IM: CPT

## 2019-10-23 PROCEDURE — 90471 IMMUNIZATION ADMIN: CPT

## 2019-10-23 RX ORDER — AMOXICILLIN AND CLAVULANATE POTASSIUM 875; 125 MG/1; MG/1
1 TABLET, FILM COATED ORAL EVERY 12 HOURS SCHEDULED
Qty: 20 TABLET | Refills: 0 | Status: SHIPPED | OUTPATIENT
Start: 2019-10-23 | End: 2019-11-02

## 2019-10-23 NOTE — PROGRESS NOTES
Assessment/Plan:    -dog bite form will be completed  -keep wounds clean and dry  -follow-up with any redness or discharge  -take Augmentin twice daily with food  -Tdap and flu vaccine today  -over-the-counter generic Sudafed as needed as directed  -over-the-counter Mucinex for cough as needed as package directs  Advised of possible drowsy side effects  Avoid the medication of working or driving  -follow-up if any symptoms increase or there is no improvement over the next 2-3 days    M*Intertwine software was used to dictate this note  It may contain errors with dictating incorrect words/spelling  Please contact provider directly for any questions  Diagnoses and all orders for this visit:    Acute upper respiratory infection    Dog bite of left hand, initial encounter  -     amoxicillin-clavulanate (AUGMENTIN) 875-125 mg per tablet; Take 1 tablet by mouth every 12 (twelve) hours for 10 days          Subjective:      Patient ID: Mirtha Patel is a 37 y o  female  Patient presents today with nasal congestion, cough over the past 4-5 days  She states last week her child was diagnosed with strep throat  She initially had a sore throat but she states that her sore throat has resolved  Denies any known fevers or chills  She has not taking any medications over-the-counter for her symptoms  Yesterday she states that her shin hours are bit her left hand  She does have a laceration on her 3rd digit and 2 lacerations on her dorsal hand  She denies any redness or drainage  No fevers or chills  No current treatment  She is unsure of her tetanus is up-to-date  She would also like a flu vaccine of possible today        The following portions of the patient's history were reviewed and updated as appropriate:   She  has a past medical history of Abdominal pain, Abnormality of eyelid, Arthritis, Drug abuse ([address] ), Ganglion cyst of wrist, right, Hepatitis C, Hepatitis C virus infection, Hordeolum externum left eye, unspecified eyelid, Pyelonephritis, Stargardt's disease, Stargardt's disease, Tear of right scapholunate ligament, Torn ligament, and Wears glasses  She   Patient Active Problem List    Diagnosis Date Noted    Acute upper respiratory infection 10/23/2019    Dog bite of left hand 10/23/2019    Intractable migraine without aura and with status migrainosus 2019    Overweight 2019    Neck pain 2019    Poor posture 2019    Lipid screening 2019    Bacterial conjunctivitis 2019    Cervical high risk HPV (human papillomavirus) test positive 2019    Encounter for annual routine gynecological examination 2019    Encounter for screening mammogram for malignant neoplasm of breast 2019    Cigarette nicotine dependence without complication     Medicare annual wellness visit, initial 2019    Eyelid lesion, benign 2019    Acute pain of left knee 2019    History of hepatitis C 2019    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, intellectual disability, and dysmorphic features [facility] 2019    [facility]     Fatigue 2018    Renal cyst 2017    Headache 2015    Depression 2015    [facility] of [facility] 2015    Condyloma acuminata 2015    Hemorrhoid 2015     She  has a past surgical history that includes  section; Dental surgery; pr esophagogastroduodenoscopy transoral diagnostic (N/A, 2016); and Induced   Her family history includes Cancer in her mother; Diabetes in her brother; Heart attack in her mother; Seizures in her father  She  reports that she has been smoking cigarettes  She has a 25 00 pack-year smoking history  She has never used smokeless tobacco  She reports that she does not drink alcohol or use drugs    Current Outpatient Medications   Medication Sig Dispense Refill  amoxicillin-clavulanate (AUGMENTIN) 875-125 mg per tablet Take 1 tablet by mouth every 12 (twelve) hours for 10 days 20 tablet 0    cyclobenzaprine (FLEXERIL) 10 mg tablet Take 1 tablet (10 mg total) by mouth 3 (three) times a day as needed for muscle spasms 30 tablet 1    mesalamine (PENTASA) 500 mg CR capsule Take 1,500 mg by mouth 4 (four) times a day      sertraline (ZOLOFT) 100 mg tablet Take 1 tablet daily with 50 mg tablet  90 tablet 1    sertraline (ZOLOFT) 50 mg tablet Take 1 tablet daily with 100 mg tablet  90 tablet 1     No current facility-administered medications for this visit  Current Outpatient Medications on File Prior to Visit   Medication Sig    cyclobenzaprine (FLEXERIL) 10 mg tablet Take 1 tablet (10 mg total) by mouth 3 (three) times a day as needed for muscle spasms    mesalamine (PENTASA) 500 mg CR capsule Take 1,500 mg by mouth 4 (four) times a day    sertraline (ZOLOFT) 100 mg tablet Take 1 tablet daily with 50 mg tablet   sertraline (ZOLOFT) 50 mg tablet Take 1 tablet daily with 100 mg tablet  No current facility-administered medications on file prior to visit  She has No Known Allergies       Review of Systems   Constitutional: Negative for chills and fever  HENT: Positive for congestion and postnasal drip  Negative for ear pain and sore throat  Respiratory: Positive for cough  Skin:        As stated in HPI         Objective:      /70 (BP Location: Left arm, Patient Position: Sitting, Cuff Size: Adult)   Pulse 79   Temp 97 9 °F (36 6 °C) (Tympanic)   Resp 16   Ht 5' 9" (1 753 m)   Wt 77 1 kg (170 lb)   SpO2 98%   BMI 25 10 kg/m²          Physical Exam   Constitutional: She appears well-developed and well-nourished  No distress  HENT:   Head: Normocephalic and atraumatic  Right Ear: External ear normal    Left Ear: External ear normal    Mouth/Throat: Oropharynx is clear and moist  No oropharyngeal exudate  Neck: Neck supple  Cardiovascular: Normal rate, regular rhythm and normal heart sounds  No murmur heard  Pulmonary/Chest: Effort normal and breath sounds normal  No respiratory distress  She has no wheezes  She has no rales  Lymphadenopathy:     She has no cervical adenopathy  Neurological: She is alert  Skin: Skin is warm  Left hand:  She does have a 1 5 superficial laceration over the dorsal aspect of the distal 3rd digit  She does have a small wound next to the laceration  The wound edges are well approximated  There is no discharge  Minimal erythema  She does have 2 smaller superficial lacerations on the dorsal aspect of her hand  No erythema or discharge  Psychiatric: She has a normal mood and affect

## 2019-10-25 ENCOUNTER — TELEPHONE (OUTPATIENT)
Dept: FAMILY MEDICINE CLINIC | Facility: CLINIC | Age: 39
End: 2019-10-25

## 2019-10-25 NOTE — TELEPHONE ENCOUNTER
Pt was seen in office for dog bite and she will be calling to let me know when he had his last rabies shot so I can send form to [place] [facility]

## 2019-12-18 ENCOUNTER — OFFICE VISIT (OUTPATIENT)
Dept: URGENT CARE | Age: 39
End: 2019-12-18
Payer: COMMERCIAL

## 2019-12-18 VITALS
TEMPERATURE: 96.5 F | HEIGHT: 69 IN | HEART RATE: 86 BPM | RESPIRATION RATE: 18 BRPM | BODY MASS INDEX: 23.7 KG/M2 | SYSTOLIC BLOOD PRESSURE: 134 MMHG | OXYGEN SATURATION: 98 % | WEIGHT: 160 LBS | DIASTOLIC BLOOD PRESSURE: 65 MMHG

## 2019-12-18 DIAGNOSIS — J20.8 ACUTE BACTERIAL BRONCHITIS: Primary | ICD-10-CM

## 2019-12-18 DIAGNOSIS — B96.89 ACUTE BACTERIAL BRONCHITIS: Primary | ICD-10-CM

## 2019-12-18 PROCEDURE — 99213 OFFICE O/P EST LOW 20 MIN: CPT | Performed by: NURSE PRACTITIONER

## 2019-12-18 RX ORDER — AZITHROMYCIN 250 MG/1
TABLET, FILM COATED ORAL
Qty: 6 TABLET | Refills: 0 | Status: SHIPPED | OUTPATIENT
Start: 2019-12-18 | End: 2019-12-22

## 2019-12-18 NOTE — PATIENT INSTRUCTIONS
Follow up with pcp   Take meds as directed   Increase fluids     Take zyrtec or allegra daily   Use flonase as directed  If worse go to the ER   Rest     Take antibiotic as directed  Change saline spray at home, never use the same inhalers, nasal sprays with other family members

## 2019-12-18 NOTE — PROGRESS NOTES
NAME: Mirtha Patel is a 37 y o  female  : 1980    MRN: [id number]    /65   Pulse 86   Temp (!) 96 5 °F (35 8 °C)   Resp 18   Ht 5' 9" (1 753 m)   Wt 72 6 kg (160 lb)   LMP 2019 (Approximate)   SpO2 98%   BMI 23 63 kg/m²     Assessment and Plan   Acute bacterial bronchitis [J20 8, B96 89]  1  Acute bacterial bronchitis  azithromycin (ZITHROMAX) 250 mg tablet       Mirtha was seen today for cough  Diagnoses and all orders for this visit:    Acute bacterial bronchitis  -     azithromycin (ZITHROMAX) 250 mg tablet; Take 2 tablets today and only 1 pill daily until finished  Patient Instructions   Patient Instructions   Follow up with pcp   Take meds as directed   Increase fluids     Take zyrtec or allegra daily   Use flonase as directed  If worse go to the ER   Rest     Take antibiotic as directed  Change saline spray at home, never use the same inhalers, nasal sprays with other family members    Proceed to ER if symptoms worsen  Chief Complaint     Chief Complaint   Patient presents with    Cough     Pt c/o cold symptoms for the past few days which progressed to sore throat and productive cough with brownish sputum for the past 2 days  Unsure of fever  Pt has been taking Екатерина-Amlin for symptoms  History of Present Illness     Patient here today for cough and congestion for the past week  She has taken some over the counter meds with little relief  Her daughter dx and tx with b/l ear infection and using nasal saline daily  She is using the same one that her daughter is using as well  Discussed not to do that anymore and that it is unsanitary  She has no fever, vss, no n/v/d and no SOB or CP    Cough   This is a new problem  The current episode started in the past 7 days  The problem has been gradually worsening  The problem occurs constantly  The cough is productive of sputum   Associated symptoms include ear congestion, headaches, nasal congestion, postnasal drip and a sore throat  Pertinent negatives include no chest pain, ear pain, fever, heartburn, rhinorrhea or shortness of breath  She has tried rest for the symptoms  The treatment provided mild relief  There is no history of asthma  Review of Systems   Review of Systems   Constitutional: Negative for fatigue and fever  HENT: Positive for postnasal drip and sore throat  Negative for congestion, ear pain, rhinorrhea, sinus pressure and sinus pain  Eyes: Negative  Respiratory: Positive for cough  Negative for chest tightness and shortness of breath  Cardiovascular: Negative for chest pain  Gastrointestinal: Negative for heartburn  Neurological: Positive for headaches           Current Medications       Current Outpatient Medications:     azithromycin (ZITHROMAX) 250 mg tablet, Take 2 tablets today and only 1 pill daily until finished , Disp: 6 tablet, Rfl: 0    cyclobenzaprine (FLEXERIL) 10 mg tablet, Take 1 tablet (10 mg total) by mouth 3 (three) times a day as needed for muscle spasms, Disp: 30 tablet, Rfl: 1    mesalamine (PENTASA) 500 mg CR capsule, Take 1,500 mg by mouth 4 (four) times a day, Disp: , Rfl:     sertraline (ZOLOFT) 100 mg tablet, Take 1 tablet daily with 50 mg tablet , Disp: 90 tablet, Rfl: 1    sertraline (ZOLOFT) 50 mg tablet, Take 1 tablet daily with 100 mg tablet , Disp: 90 tablet, Rfl: 1    Current Allergies     Allergies as of 12/18/2019    (No Known Allergies)              Past Medical History:   Diagnosis Date    Abdominal pain     EGD today -7/2016    Abnormality of eyelid     LAST ASSESSED: [id number]    Arthritis     shoulders    Drug abuse ([facility] [address] )     Ganglion cyst of wrist, right     LAST ASSESSED: [id number]    Hepatitis C     dx 15 yrs ago-2015 retested told undetectable    Hepatitis C virus infection     LAST ASSESSED: [id number]    Hordeolum externum left eye, unspecified eyelid     LAST ASSESSED: [id number]    Pyelonephritis     pyelonephritis    Stargardt's disease     Stargardt's disease     Tear of right scapholunate ligament     LAST ASSESSED: [id number]    Torn ligament     R   wrist    Wears glasses     reading       Past Surgical History:   Procedure Laterality Date     SECTION      LAST ASSESSED: [id number]    DENTAL SURGERY      INDUCED       SURIGCAL TREATMENT FOR     IL ESOPHAGOGASTRODUODENOSCOPY TRANSORAL DIAGNOSTIC N/A 2016    Procedure: ESOPHAGOGASTRODUODENOSCOPY (EGD); Surgeon: Boo Casillas MD;  Location: [facility]; Service: Gastroenterology       Family History   Problem Relation Age of Onset    Heart attack Mother     Cancer Mother         LEIOMYOSARCOMA    Seizures Father         EPILEPSY    Diabetes Brother          Medications have been verified  The following portions of the patient's history were reviewed and updated as appropriate: allergies, current medications, past family history, past medical history, past social history, past surgical history and problem list     Objective   /65   Pulse 86   Temp (!) 96 5 °F (35 8 °C)   Resp 18   Ht 5' 9" (1 753 m)   Wt 72 6 kg (160 lb)   LMP 2019 (Approximate)   SpO2 98%   BMI 23 63 kg/m²      Physical Exam     Physical Exam   Constitutional: She is oriented to person, place, and time  She appears well-developed and well-nourished  No distress  HENT:   Head: Not macrocephalic  Right Ear: Hearing and tympanic membrane normal    Left Ear: Hearing and tympanic membrane normal    Nose: Nose normal  Right sinus exhibits no maxillary sinus tenderness  Left sinus exhibits no maxillary sinus tenderness  Mouth/Throat: Uvula is midline, oropharynx is clear and moist and mucous membranes are normal  Tonsils are 0 on the right  Tonsils are 0 on the left  No tonsillar exudate  Cardiovascular: Normal rate, regular rhythm and normal heart sounds     Pulmonary/Chest: Effort normal  She has decreased breath sounds in the right lower field and the left lower field  She has wheezes  Abdominal: Soft  Normal appearance and bowel sounds are normal    Neurological: She is alert and oriented to person, place, and time  She is not disoriented  Skin: Skin is warm  Capillary refill takes less than 2 seconds  No rash noted         DENNISE Lala

## 2020-01-30 DIAGNOSIS — F32.A DEPRESSION, UNSPECIFIED DEPRESSION TYPE: ICD-10-CM

## 2020-01-30 RX ORDER — SERTRALINE HYDROCHLORIDE 100 MG/1
TABLET, FILM COATED ORAL
Qty: 90 TABLET | Refills: 1 | OUTPATIENT
Start: 2020-01-30

## 2020-01-30 NOTE — TELEPHONE ENCOUNTER
Left another message stating we got a request to refill a medication however she is due for a follow up appt and to please call the office to schedule that appt

## 2020-01-31 ENCOUNTER — TELEPHONE (OUTPATIENT)
Dept: OBGYN CLINIC | Facility: CLINIC | Age: 40
End: 2020-01-31

## 2020-01-31 NOTE — TELEPHONE ENCOUNTER
----- Message from Sherwin Lovelace RN sent at 1/24/2020  3:35 PM EST -----      ----- Message -----  From: SYSTEM  Sent: 12/14/2019  12:13 AM EST  To: Govind Zhu

## 2020-02-12 DIAGNOSIS — Z76.0 MEDICATION REFILL: ICD-10-CM

## 2020-02-12 NOTE — TELEPHONE ENCOUNTER
Fax from [facility] (Kiera Ragland) refill Sertraline HCL 50 mg

## 2020-02-12 NOTE — TELEPHONE ENCOUNTER
Last seen 8/29/19 for depression  Last seen in office 10/23/19  LM for pt to make follow up appt, I also left message on 1/30/20     I called pharmacy and they are aware pt needs appt for refills

## 2020-02-18 ENCOUNTER — OFFICE VISIT (OUTPATIENT)
Dept: URGENT CARE | Age: 40
End: 2020-02-18
Payer: COMMERCIAL

## 2020-02-18 VITALS
TEMPERATURE: 97.7 F | HEART RATE: 68 BPM | OXYGEN SATURATION: 98 % | HEIGHT: 69 IN | WEIGHT: 168 LBS | RESPIRATION RATE: 18 BRPM | SYSTOLIC BLOOD PRESSURE: 113 MMHG | DIASTOLIC BLOOD PRESSURE: 76 MMHG | BODY MASS INDEX: 24.88 KG/M2

## 2020-02-18 DIAGNOSIS — J02.0 STREP PHARYNGITIS: Primary | ICD-10-CM

## 2020-02-18 DIAGNOSIS — R06.02 SHORTNESS OF BREATH: ICD-10-CM

## 2020-02-18 DIAGNOSIS — J02.9 SORE THROAT: ICD-10-CM

## 2020-02-18 LAB — S PYO AG THROAT QL: NEGATIVE

## 2020-02-18 PROCEDURE — 94640 AIRWAY INHALATION TREATMENT: CPT | Performed by: PHYSICIAN ASSISTANT

## 2020-02-18 PROCEDURE — 87147 CULTURE TYPE IMMUNOLOGIC: CPT | Performed by: PHYSICIAN ASSISTANT

## 2020-02-18 PROCEDURE — 87070 CULTURE OTHR SPECIMN AEROBIC: CPT | Performed by: PHYSICIAN ASSISTANT

## 2020-02-18 PROCEDURE — 99213 OFFICE O/P EST LOW 20 MIN: CPT | Performed by: PHYSICIAN ASSISTANT

## 2020-02-18 PROCEDURE — 93005 ELECTROCARDIOGRAM TRACING: CPT | Performed by: PHYSICIAN ASSISTANT

## 2020-02-18 RX ORDER — PREDNISONE 50 MG/1
50 TABLET ORAL DAILY
Qty: 5 TABLET | Refills: 0 | Status: SHIPPED | OUTPATIENT
Start: 2020-02-18 | End: 2020-03-17

## 2020-02-18 RX ORDER — AMOXICILLIN 500 MG/1
500 CAPSULE ORAL EVERY 12 HOURS SCHEDULED
Qty: 14 CAPSULE | Refills: 0 | Status: SHIPPED | OUTPATIENT
Start: 2020-02-18 | End: 2020-02-25

## 2020-02-18 RX ORDER — ALBUTEROL SULFATE 90 UG/1
2 AEROSOL, METERED RESPIRATORY (INHALATION) EVERY 6 HOURS PRN
Qty: 18 G | Refills: 0 | Status: SHIPPED | OUTPATIENT
Start: 2020-02-18 | End: 2021-03-12 | Stop reason: ALTCHOICE

## 2020-02-18 RX ORDER — ALBUTEROL SULFATE 2.5 MG/3ML
2.5 SOLUTION RESPIRATORY (INHALATION) ONCE
Status: COMPLETED | OUTPATIENT
Start: 2020-02-18 | End: 2020-02-18

## 2020-02-18 RX ADMIN — ALBUTEROL SULFATE 2.5 MG: 2.5 SOLUTION RESPIRATORY (INHALATION) at 20:05

## 2020-02-19 LAB
ATRIAL RATE: 59 BPM
P AXIS: 31 DEGREES
PR INTERVAL: 148 MS
QRS AXIS: 43 DEGREES
QRSD INTERVAL: 88 MS
QT INTERVAL: 436 MS
QTC INTERVAL: 431 MS
T WAVE AXIS: 58 DEGREES
VENTRICULAR RATE: 59 BPM

## 2020-02-19 PROCEDURE — 93010 ELECTROCARDIOGRAM REPORT: CPT | Performed by: INTERNAL MEDICINE

## 2020-02-19 NOTE — PROGRESS NOTES
[facility]      NAME: Charo Patel is a 37 y o  female  : 1980    MRN: [id number]  DATE: 2020  TIME: 8:27 PM    Assessment and Plan   Strep pharyngitis [J02 0]  1  Strep pharyngitis  amoxicillin (AMOXIL) 500 mg capsule    predniSONE 50 mg tablet   2  Sore throat  POCT rapid strepA    Throat culture   3  Shortness of breath  ECG 12 lead    albuterol inhalation solution 2 5 mg    predniSONE 50 mg tablet    albuterol (Ventolin HFA) 90 mcg/act inhaler     Rapid Strep negative     Twelve lead EKG showed normal sinus bradycardia  Official report pending     Albuterol was administered to patient via nebulizer  Patient tolerated well  O2 saturation 98% pulse is 80 upon discharge    Patient Instructions     Follow up with PCP in 3-5 days  Proceed to  ER if symptoms worsen  Patient placed on amoxicillin, steroids, albuterol as needed  Continue with symptomatic treatment  Patient will begin flonase as needed for nasal congestion  Tylenol as needed for fever of chills   Warm salt gargles as needed for sore throat     Chief Complaint     Chief Complaint   Patient presents with    Cold Like Symptoms     soer throat, fatigue, slight cough, difficulty at times "catching my breath"  that started a few days ago  No fever reported  History of Present Illness       Patient is a 68-year-old female presenting the office for sore throat in chest tightness  Patient states that symptoms ongoing for the past 5 days duration  Patient states that her symptoms began on Friday with basic cold  Patient states that she began to have nasal congestion, mild sore throat and coughing  Patient states that the nasal congestion and coughing has since subsided but is left with a residual feeling of chest tightness and a sore throat  Patient denies any documented fever but states she felt fevers over the weekend  Patient has any problems arise ears or nose    Patient states that the throat pain is symmetric in nature  Patient does admit to having mild shortness of breath and chest tightness currently  Patient denies any cough or chest pain  Patient denies any nausea or vomiting but does admit to having bouts of diarrhea over the weekend  Patient states that she has been using cough drops, warm tea and eating and charges minimal relief of symptoms  Patient states that she comes in contact with sick contacts daily at   Patient offers no other complaints at this time  Sore Throat    This is a new problem  The current episode started in the past 7 days  The problem has been gradually worsening  Neither side of throat is experiencing more pain than the other  There has been no fever  The pain is at a severity of 4/10  The pain is mild  Associated symptoms include shortness of breath  Pertinent negatives include no abdominal pain, congestion, coughing, diarrhea, drooling, ear discharge, ear pain, headaches, hoarse voice, plugged ear sensation, neck pain, stridor, swollen glands, trouble swallowing or vomiting  She has had no exposure to strep or mono  Treatments tried: warm liquids, cough drops  The treatment provided mild relief  Review of Systems   Review of Systems   Constitutional: Positive for fatigue  Negative for activity change, appetite change, chills, diaphoresis, fever and unexpected weight change  HENT: Positive for sore throat  Negative for congestion, dental problem, drooling, ear discharge, ear pain, facial swelling, hearing loss, hoarse voice, mouth sores, nosebleeds, postnasal drip, rhinorrhea, sinus pressure, sinus pain, sneezing, tinnitus, trouble swallowing and voice change  Eyes: Negative  Respiratory: Positive for chest tightness and shortness of breath  Negative for apnea, cough, choking, wheezing and stridor  Cardiovascular: Negative  Gastrointestinal: Negative  Negative for abdominal pain, diarrhea and vomiting  Endocrine: Negative      Genitourinary: Negative  Musculoskeletal: Negative  Negative for neck pain  Skin: Negative  Allergic/Immunologic: Negative  Neurological: Negative  Negative for headaches  Hematological: Negative  Psychiatric/Behavioral: Negative  Current Medications       Current Outpatient Medications:     mesalamine (PENTASA) 500 mg CR capsule, Take 1,500 mg by mouth 4 (four) times a day, Disp: , Rfl:     sertraline (ZOLOFT) 100 mg tablet, Take 1 tablet daily with 50 mg tablet , Disp: 90 tablet, Rfl: 1    sertraline (ZOLOFT) 50 mg tablet, Take 1 tablet daily with 100 mg tablet , Disp: 90 tablet, Rfl: 1    albuterol (Ventolin HFA) 90 mcg/act inhaler, Inhale 2 puffs every 6 (six) hours as needed for wheezing or shortness of breath, Disp: 18 g, Rfl: 0    amoxicillin (AMOXIL) 500 mg capsule, Take 1 capsule (500 mg total) by mouth every 12 (twelve) hours for 7 days, Disp: 14 capsule, Rfl: 0    cyclobenzaprine (FLEXERIL) 10 mg tablet, Take 1 tablet (10 mg total) by mouth 3 (three) times a day as needed for muscle spasms (Patient not taking: Reported on 2/18/2020), Disp: 30 tablet, Rfl: 1    predniSONE 50 mg tablet, Take 1 tablet (50 mg total) by mouth daily, Disp: 5 tablet, Rfl: 0  No current facility-administered medications for this visit       Current Allergies     Allergies as of 02/18/2020    (No Known Allergies)            The following portions of the patient's history were reviewed and updated as appropriate: allergies, current medications, past family history, past medical history, past social history, past surgical history and problem list      Past Medical History:   Diagnosis Date    Abdominal pain     EGD today -7/2016    Abnormality of eyelid     LAST ASSESSED: [id number]    Arthritis     shoulders    Drug abuse ([facility] [address] )     Ganglion cyst of wrist, right     LAST ASSESSED: [id number]    Hepatitis C     dx 15 yrs ago-2015 retested told undetectable    Hepatitis C virus infection     LAST ASSESSED: [id number]    Hordeolum externum left eye, unspecified eyelid     LAST ASSESSED: [id number]    Pyelonephritis     pyelonephritis    Stargardt's disease     Stargardt's disease     Tear of right scapholunate ligament     LAST ASSESSED: [id number]    Torn ligament     R   wrist    Wears glasses     reading       Past Surgical History:   Procedure Laterality Date     SECTION      LAST ASSESSED: [id number]    DENTAL SURGERY      INDUCED       SURIGCAL TREATMENT FOR     [place] ESOPHAGOGASTRODUODENOSCOPY TRANSORAL DIAGNOSTIC N/A 2016    Procedure: ESOPHAGOGASTRODUODENOSCOPY (EGD); Surgeon: Carlos Keane MD;  Location: [facility]; Service: Gastroenterology       Family History   Problem Relation Age of Onset    Heart attack Mother     Cancer Mother         LEIOMYOSARCOMA    Seizures Father         EPILEPSY    Diabetes Brother          Medications have been verified  Objective   /76   Pulse 68   Temp 97 7 °F (36 5 °C)   Resp 18   Ht 5' 9" (1 753 m)   Wt 76 2 kg (168 lb)   LMP 2020 (Exact Date)   SpO2 98%   BMI 24 81 kg/m²        Physical Exam     Physical Exam   Constitutional: She is oriented to person, place, and time  She appears well-developed and well-nourished  HENT:   Head: Normocephalic  Mouth/Throat: Posterior oropharyngeal erythema present  No oropharyngeal exudate or posterior oropharyngeal edema  Tonsils are 2+ on the right  Tonsils are 2+ on the left  No tonsillar exudate  Eyes: Pupils are equal, round, and reactive to light  Conjunctivae and EOM are normal    Neck: Normal range of motion  Cardiovascular: Normal rate, regular rhythm, normal heart sounds and intact distal pulses  Pulmonary/Chest: Effort normal and breath sounds normal    Neurological: She is alert and oriented to person, place, and time  Skin: Skin is warm  Capillary refill takes less than 2 seconds  Psychiatric: She has a normal mood and affect   Her behavior is normal  Judgment and thought content normal    Nursing note and vitals reviewed

## 2020-02-19 NOTE — PATIENT INSTRUCTIONS
throat  · Do not smoke  Nicotine and other chemicals in cigarettes and cigars can cause lung damage and make your symptoms worse  Ask your healthcare provider for information if you currently smoke and need help to quit  E-cigarettes or smokeless tobacco still contain nicotine  Talk to your healthcare provider before you use these products  Return to work or school  24 hours after you start antibiotic medicine  Prevent the spread of strep throat:   · Wash your hands often  Use soap and water  Wash your hands after you use the bathroom, change a child's diapers, or sneeze  Wash your hands before you prepare or eat food  · Do not share food or drinks  Replace your toothbrush after you have taken antibiotics for 24 hours  Follow up with your healthcare provider as directed:  Write down your questions so you remember to ask them during your visits  © 2017 [employer] Information is for End User's use only and may not be sold, redistributed or otherwise used for commercial purposes  All illustrations and images included in CareNotes® are the copyrighted property of [employer]  or [employer]  The above information is an  only  It is not intended as medical advice for individual conditions or treatments  Talk to your doctor, nurse or pharmacist before following any medical regimen to see if it is safe and effective for you

## 2020-02-21 LAB — BACTERIA THROAT CULT: ABNORMAL

## 2020-02-23 ENCOUNTER — TELEPHONE (OUTPATIENT)
Dept: URGENT CARE | Age: 40
End: 2020-02-23

## 2020-02-23 ENCOUNTER — TELEPHONE (OUTPATIENT)
Dept: URGENT CARE | Facility: CLINIC | Age: 40
End: 2020-02-23

## 2020-02-23 DIAGNOSIS — J02.0 STREP PHARYNGITIS: Primary | ICD-10-CM

## 2020-02-23 RX ORDER — AZITHROMYCIN 500 MG/1
500 TABLET, FILM COATED ORAL DAILY
Qty: 5 TABLET | Refills: 0 | Status: SHIPPED | OUTPATIENT
Start: 2020-02-23 | End: 2020-02-28

## 2020-02-23 NOTE — TELEPHONE ENCOUNTER
Spoke with patient at length  Patient continues to have throat pain  Will switch antibiotics from amoxicillin to azithromycin  Continue with treatment as directed otherwise  Patient has upper respiratory symptoms have all but subsided  Advised patient to follow-up with family doctor in next 2 days for evaluation of throat pain  Patient understood all questions answered at this time

## 2020-02-23 NOTE — TELEPHONE ENCOUNTER
Left message for patient  Patient tested positive for strep    Will continue with antibiotics as directed

## 2020-03-17 ENCOUNTER — OFFICE VISIT (OUTPATIENT)
Dept: FAMILY MEDICINE CLINIC | Facility: CLINIC | Age: 40
End: 2020-03-17
Payer: COMMERCIAL

## 2020-03-17 VITALS
TEMPERATURE: 97.9 F | BODY MASS INDEX: 25.24 KG/M2 | DIASTOLIC BLOOD PRESSURE: 80 MMHG | WEIGHT: 170.4 LBS | SYSTOLIC BLOOD PRESSURE: 118 MMHG | HEART RATE: 84 BPM | HEIGHT: 69 IN | RESPIRATION RATE: 16 BRPM | OXYGEN SATURATION: 98 %

## 2020-03-17 DIAGNOSIS — Z76.0 MEDICATION REFILL: ICD-10-CM

## 2020-03-17 DIAGNOSIS — Q04.0: ICD-10-CM

## 2020-03-17 DIAGNOSIS — Q89.7: ICD-10-CM

## 2020-03-17 DIAGNOSIS — H35.53: ICD-10-CM

## 2020-03-17 DIAGNOSIS — F32.A DEPRESSION, UNSPECIFIED DEPRESSION TYPE: Primary | ICD-10-CM

## 2020-03-17 DIAGNOSIS — F78.A9: ICD-10-CM

## 2020-03-17 DIAGNOSIS — K50.90 CROHN'S DISEASE WITHOUT COMPLICATION, UNSPECIFIED GASTROINTESTINAL TRACT LOCATION (HCC): ICD-10-CM

## 2020-03-17 PROBLEM — J06.9 ACUTE UPPER RESPIRATORY INFECTION: Status: RESOLVED | Noted: 2019-10-23 | Resolved: 2020-03-17

## 2020-03-17 PROCEDURE — 99214 OFFICE O/P EST MOD 30 MIN: CPT | Performed by: PHYSICIAN ASSISTANT

## 2020-03-17 PROCEDURE — 3008F BODY MASS INDEX DOCD: CPT | Performed by: PHYSICIAN ASSISTANT

## 2020-03-17 RX ORDER — SERTRALINE HYDROCHLORIDE 100 MG/1
TABLET, FILM COATED ORAL
Qty: 90 TABLET | Refills: 1 | Status: SHIPPED | OUTPATIENT
Start: 2020-03-17 | End: 2020-09-02 | Stop reason: SDUPTHER

## 2020-03-17 NOTE — PROGRESS NOTES
Assessment/Plan:    -education has been given about prevention for corona virus  -advised to call the office with any respiratory symptoms and we would give her further guidance at that time  -continue follow-up with the GI specialist for the Crohn's disease as advised  -continue follow-up with ophthalmology as advised  -continue the Zoloft 150 mg daily  -recommend follow-up in August and she will need her Medicare wellness at that time  She will schedule the appointment today for 30 minutes    BMI Counseling: Body mass index is 25 16 kg/m²  The BMI is above normal  Nutrition recommendations include reducing portion sizes and decreasing overall calorie intake  Exercise recommendations include exercising 3-5 times per week  Diagnoses and all orders for this visit:    Depression, unspecified depression type  -     sertraline (ZOLOFT) 100 mg tablet; Take 1 tablet daily with 50 mg tablet  Medication refill  -     sertraline (ZOLOFT) 50 mg tablet; Take 1 tablet daily with 100 mg tablet  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, intellectual disability, and dysmorphic features ([facility] )    Crohn's disease without complication, unspecified gastrointestinal tract location ([facility] )          Subjective:      Patient ID: Mirtha Patel is a 41 y o  female  Patient presents today for follow-up of depression  She states that she continues to be stabilized on the 150 mg of Zoloft  Denies any suicidal ideations  She continues with GI for her Crohn's disease  The specialist does prescribe her Pentasa  She states her only concern at this time is that she works in a  which was closed today at 12:00 p m  because of the corona virus  She states yesterday a woman who works there in her 62s with coughing but does not know if she had a fever  She does not know of any known sick contacts or travel with her    She states that the staff member told her that she has COPD and that was the reason for the cough  Patient does not have any cough, fever at this time  She also continues with the ophthalmologist for the degeneration  The following portions of the patient's history were reviewed and updated as appropriate:   She  has a past medical history of Abdominal pain, Abnormality of eyelid, Arthritis, Drug abuse ([facility] [address] ), Ganglion cyst of wrist, right, Hepatitis C, Hepatitis C virus infection, Hordeolum externum left eye, unspecified eyelid, Pyelonephritis, Stargardt's disease, Stargardt's disease, Tear of right scapholunate ligament, Torn ligament, and Wears glasses    She   Patient Active Problem List    Diagnosis Date Noted    Dog bite of left hand 10/23/2019    Intractable migraine without aura and with status migrainosus 2019    Overweight 2019    Neck pain 2019    Poor posture 2019    Lipid screening 2019    Bacterial conjunctivitis 2019    Cervical high risk HPV (human papillomavirus) test positive 2019    Encounter for annual routine gynecological examination 2019    Encounter for screening mammogram for malignant neoplasm of breast 2019    Cigarette nicotine dependence without complication     Medicare annual wellness visit, initial 2019    Eyelid lesion, benign 2019    Acute pain of left knee 2019    History of hepatitis C 2019    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 )     Fatigue 2018    Renal cyst 2017    Headache 2015    Depression 2015    [facility] of [facility] 2015    Condyloma acuminata 2015    Hemorrhoid 2015     She  has a past surgical history that includes  section; Dental surgery; pr esophagogastroduodenoscopy transoral diagnostic (N/A, 2016); and Induced   Her family history includes Cancer in her mother; Diabetes in her brother; Heart attack in her mother; Seizures in her father  She  reports that she has been smoking cigarettes  She has a 25 00 pack-year smoking history  She has never used smokeless tobacco  She reports that she does not drink alcohol or use drugs  Current Outpatient Medications   Medication Sig Dispense Refill    albuterol (Ventolin HFA) 90 mcg/act inhaler Inhale 2 puffs every 6 (six) hours as needed for wheezing or shortness of breath 18 g 0    mesalamine (PENTASA) 500 mg CR capsule Take 1,500 mg by mouth 4 (four) times a day      sertraline (ZOLOFT) 100 mg tablet Take 1 tablet daily with 50 mg tablet  90 tablet 1    sertraline (ZOLOFT) 50 mg tablet Take 1 tablet daily with 100 mg tablet  90 tablet 1     No current facility-administered medications for this visit  Current Outpatient Medications on File Prior to Visit   Medication Sig    albuterol (Ventolin HFA) 90 mcg/act inhaler Inhale 2 puffs every 6 (six) hours as needed for wheezing or shortness of breath    mesalamine (PENTASA) 500 mg CR capsule Take 1,500 mg by mouth 4 (four) times a day    [DISCONTINUED] sertraline (ZOLOFT) 100 mg tablet Take 1 tablet daily with 50 mg tablet   [DISCONTINUED] sertraline (ZOLOFT) 50 mg tablet Take 1 tablet daily with 100 mg tablet   [DISCONTINUED] cyclobenzaprine (FLEXERIL) 10 mg tablet Take 1 tablet (10 mg total) by mouth 3 (three) times a day as needed for muscle spasms (Patient not taking: Reported on 2020)    [DISCONTINUED] predniSONE 50 mg tablet Take 1 tablet (50 mg total) by mouth daily (Patient not taking: Reported on 3/17/2020)     No current facility-administered medications on file prior to visit  She has No Known Allergies       Review of Systems   Constitutional: Negative  Respiratory: Negative for cough and shortness of breath  Cardiovascular: Negative for chest pain and leg swelling  Gastrointestinal: Negative for abdominal pain  Objective:      /80 (BP Location: Left arm, Patient Position: Sitting, Cuff Size: Standard)   Pulse 84   Temp 97 9 °F (36 6 °C) (Tympanic)   Resp 16   Ht 5' 9" (1 753 m)   Wt 77 3 kg (170 lb 6 4 oz)   SpO2 98%   BMI 25 16 kg/m²          Physical Exam   Constitutional: She appears well-developed and well-nourished  No distress  HENT:   Head: Normocephalic and atraumatic  Right Ear: External ear normal    Left Ear: External ear normal    Mouth/Throat: Oropharynx is clear and moist    Neck: Neck supple  No thyromegaly present  Cardiovascular: Normal rate, regular rhythm and normal heart sounds  Exam reveals no gallop and no friction rub  No murmur heard  Pulmonary/Chest: Effort normal and breath sounds normal  No respiratory distress  She has no wheezes  She has no rales  Abdominal: Soft  Bowel sounds are normal  She exhibits no mass  There is no tenderness  Musculoskeletal: She exhibits no edema or deformity  Lymphadenopathy:     She has no cervical adenopathy  Neurological: She is alert  Skin: Skin is warm  Psychiatric: She has a normal mood and affect

## 2020-04-20 ENCOUNTER — OFFICE VISIT (OUTPATIENT)
Dept: FAMILY MEDICINE CLINIC | Facility: CLINIC | Age: 40
End: 2020-04-20
Payer: COMMERCIAL

## 2020-04-20 VITALS
SYSTOLIC BLOOD PRESSURE: 122 MMHG | OXYGEN SATURATION: 98 % | BODY MASS INDEX: 24.59 KG/M2 | TEMPERATURE: 98 F | HEART RATE: 76 BPM | WEIGHT: 166 LBS | DIASTOLIC BLOOD PRESSURE: 80 MMHG | HEIGHT: 69 IN | RESPIRATION RATE: 16 BRPM

## 2020-04-20 DIAGNOSIS — F51.01 PRIMARY INSOMNIA: ICD-10-CM

## 2020-04-20 DIAGNOSIS — Z00.00 MEDICARE ANNUAL WELLNESS VISIT, SUBSEQUENT: ICD-10-CM

## 2020-04-20 DIAGNOSIS — L72.3 SEBACEOUS CYST OF LEFT AXILLA: Primary | ICD-10-CM

## 2020-04-20 PROCEDURE — 99214 OFFICE O/P EST MOD 30 MIN: CPT | Performed by: PHYSICIAN ASSISTANT

## 2020-04-20 PROCEDURE — 3008F BODY MASS INDEX DOCD: CPT | Performed by: PHYSICIAN ASSISTANT

## 2020-04-20 PROCEDURE — G0439 PPPS, SUBSEQ VISIT: HCPCS | Performed by: PHYSICIAN ASSISTANT

## 2020-04-20 RX ORDER — NAPROXEN 500 MG/1
500 TABLET ORAL 2 TIMES DAILY WITH MEALS
Qty: 60 TABLET | Refills: 0 | Status: SHIPPED | OUTPATIENT
Start: 2020-04-20 | End: 2020-06-12

## 2020-04-20 RX ORDER — LANOLIN ALCOHOL/MO/W.PET/CERES
6 CREAM (GRAM) TOPICAL
COMMUNITY
End: 2020-06-12

## 2020-05-11 ENCOUNTER — TELEPHONE (OUTPATIENT)
Dept: OBGYN CLINIC | Facility: CLINIC | Age: 40
End: 2020-05-11

## 2020-05-12 ENCOUNTER — TELEPHONE (OUTPATIENT)
Dept: FAMILY MEDICINE CLINIC | Facility: CLINIC | Age: 40
End: 2020-05-12

## 2020-05-12 ENCOUNTER — APPOINTMENT (EMERGENCY)
Dept: RADIOLOGY | Facility: HOSPITAL | Age: 40
End: 2020-05-12
Payer: COMMERCIAL

## 2020-05-12 ENCOUNTER — HOSPITAL ENCOUNTER (EMERGENCY)
Facility: HOSPITAL | Age: 40
Discharge: HOME | End: 2020-05-12
Attending: EMERGENCY MEDICINE | Admitting: EMERGENCY MEDICINE
Payer: COMMERCIAL

## 2020-05-12 VITALS
OXYGEN SATURATION: 99 % | RESPIRATION RATE: 16 BRPM | SYSTOLIC BLOOD PRESSURE: 113 MMHG | BODY MASS INDEX: 24.71 KG/M2 | WEIGHT: 167.33 LBS | DIASTOLIC BLOOD PRESSURE: 75 MMHG | TEMPERATURE: 98.7 F | HEART RATE: 87 BPM

## 2020-05-12 DIAGNOSIS — R05.9 COUGH: Primary | ICD-10-CM

## 2020-05-12 LAB
BACTERIA UR QL AUTO: ABNORMAL /HPF
BILIRUB UR QL STRIP: NEGATIVE
CLARITY UR: ABNORMAL
COLOR UR: YELLOW
EXT PREG TEST URINE: POSITIVE
EXT. CONTROL ED NAV: NORMAL
GLUCOSE UR STRIP-MCNC: NEGATIVE MG/DL
HGB UR QL STRIP.AUTO: ABNORMAL
KETONES UR STRIP-MCNC: ABNORMAL MG/DL
LEUKOCYTE ESTERASE UR QL STRIP: NEGATIVE
NITRITE UR QL STRIP: NEGATIVE
NON-SQ EPI CELLS URNS QL MICRO: ABNORMAL /HPF
PH UR STRIP.AUTO: 6 [PH] (ref 4.5–8)
PROT UR STRIP-MCNC: NEGATIVE MG/DL
RBC #/AREA URNS AUTO: ABNORMAL /HPF
SARS-COV-2 RNA RESP QL NAA+PROBE: NEGATIVE
SP GR UR STRIP.AUTO: 1.02 (ref 1–1.03)
UROBILINOGEN UR QL STRIP.AUTO: 0.2 E.U./DL
WBC #/AREA URNS AUTO: ABNORMAL /HPF

## 2020-05-12 PROCEDURE — 81001 URINALYSIS AUTO W/SCOPE: CPT

## 2020-05-12 PROCEDURE — 87635 SARS-COV-2 COVID-19 AMP PRB: CPT | Performed by: PHYSICIAN ASSISTANT

## 2020-05-12 PROCEDURE — 87086 URINE CULTURE/COLONY COUNT: CPT

## 2020-05-12 PROCEDURE — 99284 EMERGENCY DEPT VISIT MOD MDM: CPT | Performed by: PHYSICIAN ASSISTANT

## 2020-05-12 PROCEDURE — 81025 URINE PREGNANCY TEST: CPT | Performed by: EMERGENCY MEDICINE

## 2020-05-12 PROCEDURE — 99285 EMERGENCY DEPT VISIT HI MDM: CPT

## 2020-05-12 PROCEDURE — 71045 X-RAY EXAM CHEST 1 VIEW: CPT

## 2020-05-12 RX ORDER — ALBUTEROL SULFATE 90 UG/1
2 AEROSOL, METERED RESPIRATORY (INHALATION) ONCE
Status: COMPLETED | OUTPATIENT
Start: 2020-05-12 | End: 2020-05-12

## 2020-05-12 RX ADMIN — ALBUTEROL SULFATE 2 PUFF: 90 AEROSOL, METERED RESPIRATORY (INHALATION) at 15:48

## 2020-05-13 LAB — BACTERIA UR CULT: ABNORMAL

## 2020-05-20 ENCOUNTER — TELEMEDICINE (OUTPATIENT)
Dept: FAMILY MEDICINE CLINIC | Facility: CLINIC | Age: 40
End: 2020-05-20
Payer: COMMERCIAL

## 2020-05-20 VITALS — TEMPERATURE: 97.4 F | HEIGHT: 69 IN | WEIGHT: 167 LBS | BODY MASS INDEX: 24.73 KG/M2

## 2020-05-20 DIAGNOSIS — H66.90 ACUTE OTITIS MEDIA, UNSPECIFIED OTITIS MEDIA TYPE: Primary | ICD-10-CM

## 2020-05-20 PROCEDURE — 99213 OFFICE O/P EST LOW 20 MIN: CPT | Performed by: FAMILY MEDICINE

## 2020-05-20 RX ORDER — AMOXICILLIN 500 MG/1
500 CAPSULE ORAL EVERY 8 HOURS SCHEDULED
Qty: 21 CAPSULE | Refills: 0 | Status: SHIPPED | OUTPATIENT
Start: 2020-05-20 | End: 2020-05-27

## 2020-06-12 ENCOUNTER — INITIAL PRENATAL (OUTPATIENT)
Dept: OBGYN CLINIC | Facility: CLINIC | Age: 40
End: 2020-06-12
Payer: COMMERCIAL

## 2020-06-12 VITALS
TEMPERATURE: 97.7 F | WEIGHT: 166 LBS | HEIGHT: 69 IN | BODY MASS INDEX: 24.59 KG/M2 | DIASTOLIC BLOOD PRESSURE: 90 MMHG | SYSTOLIC BLOOD PRESSURE: 134 MMHG

## 2020-06-12 DIAGNOSIS — Z34.90 PREGNANCY, UNSPECIFIED GESTATIONAL AGE: Primary | ICD-10-CM

## 2020-06-12 DIAGNOSIS — Z34.81 PRENATAL CARE, SUBSEQUENT PREGNANCY, FIRST TRIMESTER: ICD-10-CM

## 2020-06-12 DIAGNOSIS — Z11.3 SCREENING EXAMINATION FOR VENEREAL DISEASE: ICD-10-CM

## 2020-06-12 PROBLEM — R10.9 ABDOMINAL PAIN: Status: ACTIVE | Noted: 2020-06-12

## 2020-06-12 PROBLEM — Z87.891 PERSONAL HISTORY OF NICOTINE DEPENDENCE: Status: ACTIVE | Noted: 2020-06-12

## 2020-06-12 PROBLEM — R05.9 COUGH: Status: ACTIVE | Noted: 2020-06-12

## 2020-06-12 PROBLEM — B97.7 HIGH RISK HPV INFECTION: Status: ACTIVE | Noted: 2020-06-12

## 2020-06-12 PROBLEM — J02.9 SORE THROAT: Status: ACTIVE | Noted: 2020-06-12

## 2020-06-12 PROBLEM — H00.034 CELLULITIS OF LEFT UPPER EYELID: Status: ACTIVE | Noted: 2020-06-12

## 2020-06-12 PROBLEM — R07.89 TIGHTNESS IN CHEST: Status: ACTIVE | Noted: 2020-06-12

## 2020-06-12 PROBLEM — A59.01 TRICHOMONAL VULVOVAGINITIS: Status: ACTIVE | Noted: 2020-06-12

## 2020-06-12 PROBLEM — M46.1 SACROILIITIS (HCC): Status: ACTIVE | Noted: 2020-06-12

## 2020-06-12 PROBLEM — R68.89 FLU-LIKE SYMPTOMS: Status: ACTIVE | Noted: 2020-06-12

## 2020-06-12 PROBLEM — M25.531 WRIST PAIN, RIGHT: Status: ACTIVE | Noted: 2020-06-12

## 2020-06-12 PROBLEM — A74.9 CHLAMYDIAL INFECTION: Status: ACTIVE | Noted: 2020-06-12

## 2020-06-12 PROBLEM — H00.019 STYE: Status: ACTIVE | Noted: 2020-06-12

## 2020-06-12 PROBLEM — N92.6 ABNORMAL MENSES: Status: ACTIVE | Noted: 2020-06-12

## 2020-06-12 PROBLEM — R07.89 COSTOCHONDRAL PAIN: Status: ACTIVE | Noted: 2020-06-12

## 2020-06-12 PROBLEM — Z12.4 CERVICAL CANCER SCREENING: Status: ACTIVE | Noted: 2020-06-12

## 2020-06-12 PROBLEM — M67.431 GANGLION CYST OF WRIST, RIGHT: Status: ACTIVE | Noted: 2020-06-12

## 2020-06-12 PROBLEM — IMO0001 CONTRACEPTION: Status: ACTIVE | Noted: 2020-06-12

## 2020-06-12 PROBLEM — J01.90 ACUTE SINUSITIS: Status: ACTIVE | Noted: 2020-05-20

## 2020-06-12 PROBLEM — B19.20 HEPATITIS C: Status: ACTIVE | Noted: 2020-06-12

## 2020-06-12 PROBLEM — Z23 NEED FOR INFLUENZA VACCINATION: Status: ACTIVE | Noted: 2020-06-12

## 2020-06-12 PROCEDURE — G0145 SCR C/V CYTO,THINLAYER,RESCR: HCPCS | Performed by: PATHOLOGY

## 2020-06-12 PROCEDURE — 99213 OFFICE O/P EST LOW 20 MIN: CPT | Performed by: PHYSICIAN ASSISTANT

## 2020-06-12 PROCEDURE — 87624 HPV HI-RISK TYP POOLED RSLT: CPT | Performed by: PHYSICIAN ASSISTANT

## 2020-06-12 PROCEDURE — 87491 CHLMYD TRACH DNA AMP PROBE: CPT | Performed by: PHYSICIAN ASSISTANT

## 2020-06-12 PROCEDURE — G0124 SCREEN C/V THIN LAYER BY MD: HCPCS | Performed by: PATHOLOGY

## 2020-06-12 PROCEDURE — 87591 N.GONORRHOEAE DNA AMP PROB: CPT | Performed by: PHYSICIAN ASSISTANT

## 2020-06-15 LAB
C TRACH DNA SPEC QL NAA+PROBE: NEGATIVE
N GONORRHOEA DNA SPEC QL NAA+PROBE: NEGATIVE

## 2020-06-16 LAB
HPV HR 12 DNA CVX QL NAA+PROBE: POSITIVE
HPV16 DNA CVX QL NAA+PROBE: NEGATIVE
HPV18 DNA CVX QL NAA+PROBE: NEGATIVE

## 2020-06-24 LAB
LAB AP GYN PRIMARY INTERPRETATION: ABNORMAL
Lab: ABNORMAL
PATH INTERP SPEC-IMP: ABNORMAL

## 2020-06-30 ENCOUNTER — TELEPHONE (OUTPATIENT)
Dept: FAMILY MEDICINE CLINIC | Facility: CLINIC | Age: 40
End: 2020-06-30

## 2020-06-30 ENCOUNTER — APPOINTMENT (OUTPATIENT)
Dept: LAB | Facility: HOSPITAL | Age: 40
End: 2020-06-30
Attending: PHYSICIAN ASSISTANT
Payer: COMMERCIAL

## 2020-06-30 DIAGNOSIS — Z34.81 PRENATAL CARE, SUBSEQUENT PREGNANCY, FIRST TRIMESTER: ICD-10-CM

## 2020-06-30 LAB
ABO GROUP BLD: NORMAL
ALBUMIN SERPL BCP-MCNC: 3.5 G/DL (ref 3.5–5)
ALP SERPL-CCNC: 47 U/L (ref 46–116)
ALT SERPL W P-5'-P-CCNC: 19 U/L (ref 12–78)
ANION GAP SERPL CALCULATED.3IONS-SCNC: 12 MMOL/L (ref 4–13)
AST SERPL W P-5'-P-CCNC: 15 U/L (ref 5–45)
BASOPHILS # BLD AUTO: 0.05 THOUSANDS/ΜL (ref 0–0.1)
BASOPHILS NFR BLD AUTO: 1 % (ref 0–1)
BILIRUB SERPL-MCNC: 0.41 MG/DL (ref 0.2–1)
BILIRUB UR QL STRIP: NEGATIVE
BLD GP AB SCN SERPL QL: NEGATIVE
BUN SERPL-MCNC: 8 MG/DL (ref 5–25)
CALCIUM SERPL-MCNC: 8.7 MG/DL (ref 8.3–10.1)
CHLORIDE SERPL-SCNC: 100 MMOL/L (ref 100–108)
CHOLEST SERPL-MCNC: 161 MG/DL (ref 50–200)
CLARITY UR: CLEAR
CO2 SERPL-SCNC: 22 MMOL/L (ref 21–32)
COLOR UR: YELLOW
CREAT SERPL-MCNC: 0.55 MG/DL (ref 0.6–1.3)
EOSINOPHIL # BLD AUTO: 0.09 THOUSAND/ΜL (ref 0–0.61)
EOSINOPHIL NFR BLD AUTO: 1 % (ref 0–6)
ERYTHROCYTE [DISTWIDTH] IN BLOOD BY AUTOMATED COUNT: 13.7 % (ref 11.6–15.1)
GFR SERPL CREATININE-BSD FRML MDRD: 116 ML/MIN/1.73SQ M
GLUCOSE 1H P 50 G GLC PO SERPL-MCNC: 63 MG/DL
GLUCOSE SERPL-MCNC: 64 MG/DL (ref 65–140)
GLUCOSE UR STRIP-MCNC: NEGATIVE MG/DL
HCT VFR BLD AUTO: 38.6 % (ref 34.8–46.1)
HDLC SERPL-MCNC: 72 MG/DL
HGB BLD-MCNC: 12.9 G/DL (ref 11.5–15.4)
HGB UR QL STRIP.AUTO: NEGATIVE
IMM GRANULOCYTES # BLD AUTO: 0.04 THOUSAND/UL (ref 0–0.2)
IMM GRANULOCYTES NFR BLD AUTO: 1 % (ref 0–2)
KETONES UR STRIP-MCNC: NEGATIVE MG/DL
LDLC SERPL CALC-MCNC: 78 MG/DL (ref 0–100)
LEUKOCYTE ESTERASE UR QL STRIP: NEGATIVE
LYMPHOCYTES # BLD AUTO: 1.69 THOUSANDS/ΜL (ref 0.6–4.47)
LYMPHOCYTES NFR BLD AUTO: 20 % (ref 14–44)
MCH RBC QN AUTO: 31.1 PG (ref 26.8–34.3)
MCHC RBC AUTO-ENTMCNC: 33.4 G/DL (ref 31.4–37.4)
MCV RBC AUTO: 93 FL (ref 82–98)
MONOCYTES # BLD AUTO: 0.85 THOUSAND/ΜL (ref 0.17–1.22)
MONOCYTES NFR BLD AUTO: 10 % (ref 4–12)
NEUTROPHILS # BLD AUTO: 5.56 THOUSANDS/ΜL (ref 1.85–7.62)
NEUTS SEG NFR BLD AUTO: 67 % (ref 43–75)
NITRITE UR QL STRIP: NEGATIVE
NONHDLC SERPL-MCNC: 89 MG/DL
NRBC BLD AUTO-RTO: 0 /100 WBCS
PH UR STRIP.AUTO: 8 [PH]
PLATELET # BLD AUTO: 271 THOUSANDS/UL (ref 149–390)
PMV BLD AUTO: 9.7 FL (ref 8.9–12.7)
POTASSIUM SERPL-SCNC: 3.5 MMOL/L (ref 3.5–5.3)
PROT SERPL-MCNC: 7 G/DL (ref 6.4–8.2)
PROT UR STRIP-MCNC: NEGATIVE MG/DL
RBC # BLD AUTO: 4.15 MILLION/UL (ref 3.81–5.12)
RH BLD: POSITIVE
RUBV IGG SERPL IA-ACNC: 89.7 IU/ML
SODIUM SERPL-SCNC: 134 MMOL/L (ref 136–145)
SP GR UR STRIP.AUTO: 1.01 (ref 1–1.03)
TRIGL SERPL-MCNC: 55 MG/DL
TSH SERPL DL<=0.05 MIU/L-ACNC: 0.69 UIU/ML (ref 0.36–3.74)
UROBILINOGEN UR QL STRIP.AUTO: 0.2 E.U./DL
WBC # BLD AUTO: 8.28 THOUSAND/UL (ref 4.31–10.16)

## 2020-06-30 PROCEDURE — 80053 COMPREHEN METABOLIC PANEL: CPT | Performed by: PHYSICIAN ASSISTANT

## 2020-06-30 PROCEDURE — 86803 HEPATITIS C AB TEST: CPT

## 2020-06-30 PROCEDURE — 80081 OBSTETRIC PANEL INC HIV TSTG: CPT | Performed by: PHYSICIAN ASSISTANT

## 2020-06-30 PROCEDURE — 84443 ASSAY THYROID STIM HORMONE: CPT | Performed by: PHYSICIAN ASSISTANT

## 2020-06-30 PROCEDURE — 82950 GLUCOSE TEST: CPT

## 2020-06-30 PROCEDURE — 81003 URINALYSIS AUTO W/O SCOPE: CPT

## 2020-06-30 PROCEDURE — 80061 LIPID PANEL: CPT | Performed by: PHYSICIAN ASSISTANT

## 2020-06-30 PROCEDURE — 36415 COLL VENOUS BLD VENIPUNCTURE: CPT | Performed by: PHYSICIAN ASSISTANT

## 2020-06-30 PROCEDURE — 87522 HEPATITIS C REVRS TRNSCRPJ: CPT

## 2020-07-01 LAB
HBV SURFACE AG SER QL: NORMAL
HCV AB SER QL: ABNORMAL
HIV 1+2 AB+HIV1 P24 AG SERPL QL IA: NORMAL
RPR SER QL: NORMAL

## 2020-07-02 ENCOUNTER — TELEMEDICINE (OUTPATIENT)
Dept: PERINATAL CARE | Facility: CLINIC | Age: 40
End: 2020-07-02

## 2020-07-02 DIAGNOSIS — O35.2XX0 HEREDITARY DISEASE IN FAMILY POSSIBLY AFFECTING FETUS, AFFECTING MANAGEMENT OF MOTHER IN PREGNANCY, SINGLE OR UNSPECIFIED FETUS: ICD-10-CM

## 2020-07-02 DIAGNOSIS — Z31.5 ENCOUNTER FOR PROCREATIVE GENETIC COUNSELING: ICD-10-CM

## 2020-07-02 DIAGNOSIS — O09.529 ANTEPARTUM MULTIGRAVIDA OF ADVANCED MATERNAL AGE: Primary | ICD-10-CM

## 2020-07-02 DIAGNOSIS — O09.299 H/O FETAL ANOMALY IN PRIOR PREGNANCY, CURRENTLY PREGNANT: ICD-10-CM

## 2020-07-02 PROCEDURE — NC001 PR NO CHARGE

## 2020-07-02 NOTE — PROGRESS NOTES
Genetic Counseling   High-Risk Gestation Note    Appointment Date:  2020  Referred By: Shirlene Juárez MD  YOB: 1980  Partner:  Augusto Erazo  Indication for Visit:  advanced maternal age, personal and/or family history of genetic disorder:  Pt with Stargardt's disease and personal and/or family history of malformation :  daughter with atrial septal defect  Pregnancy History:   Estimated Date of Delivery: 21  Estimated Gestational Age: 11w1d    Virtual Regular Visit      Assessment/Plan:    Problem List Items Addressed This Visit     None      Visit Diagnoses     Antepartum multigravida of advanced maternal age    -  Primary    Hereditary disease in family possibly affecting fetus, affecting management of mother in pregnancy, single or unspecified fetus        H/O fetal anomaly in prior pregnancy, currently pregnant        Encounter for procreative genetic counseling                   Reason for visit is   Chief Complaint   Patient presents with    Virtual Regular Visit        Encounter provider Juanjo Vazquez    Provider located at [address] [place] [address] [id number] [phone]      Recent Visits  No visits were found meeting these conditions  Showing recent visits within past 7 days and meeting all other requirements     Future Appointments  No visits were found meeting these conditions  Showing future appointments within next 150 days and meeting all other requirements        The patient was identified by name and date of birth  Mirtha Patel was informed that this is a telemedicine visit and that the visit is being conducted through [employer]  My office door was closed  No one else was in the room  She acknowledged consent and understanding of privacy and security of the video platform  The patient has agreed to participate and understands they can discontinue the visit at any time      Patient is aware this is a billable service  Subjective  Donovan Boeck is a 41 y o  female who presented for genetic counseling to discuss maternal age related risks for aneuploidy  The risk of Down syndrome at age 41 at delivery is 1/52, and the risk for any chromosomal abnormality at this age is 1/39  The risks, benefits, and limitations of amniocentesis were discussed with the patient  Amniocentesis is performed under direct real time ultrasound visualization to avoid both the fetus and the placenta  Once amniotic fluid is withdrawn, laboratory analysis is performed and amniotic fluid alpha-fetoprotein, as well as chromosome analysis is undertaken  The risk of genetic amniocentesis includes, but is not limited to less than 1 in 300 pregnancy loss rate or  delivery rate if 23 weeks or greater, infection, bleeding, rupture of membranes, failure of cells to grow, karyotype error, laboratory error, etc   Occasionally a repeat amniocentesis is necessary due to cell culture failure  Chromosome analysis from amniocentesis is 99 9% accurate and alpha-fetoprotein analysis can detect approximately 95% of open neural tube defects  Chorionic villus sampling (CVS) is another diagnostic testing option that is available earlier than amniocentesis, between 10-14 weeks gestation  Like amniocentesis, CVS is 99% accurate for detecting chromosomal problems  Unlike amniocentesis, CVS cannot detect alpha-fetoprotein levels in order to determine the risk for open neural tube defects  MSAFP testing would need to be performed at 15-20 weeks gestation for this purpose  The risk of CVS includes, but is not limited to, less than a 1 in 300 risk for pregnancy loss  There is also a 1% risk for maternal cell contamination and cell culture failure, in which case the CVS would need to be followed-up with amniocentesis  We reviewed the testing option of cell free fetal DNA screening (also known as noninvasive prenatal testing or NIPT)  We discussed that it is a serum test to identify fragments of fetal DNA in maternal blood  We reviewed the benefits and limitations of cell free fetal DNA screening in detecting Down syndrome, Trisomy 13, Trisomy 25 and sex chromosome aneuploidies  We also discussed that cell free fetal DNA screening does not detect additional chromosomal abnormalities and the possibility of a failed test result  As cell free fetal DNA screening does not detect open neural tube defects, MSAFP screening is available at 15-20 weeks gestation  We discussed the availability of an ultrasound between 11-14 weeks gestation to measure the nuchal translucency (NT), which can assess for chromosome abnormalities, cardiac defects, and other adverse pregnancy outcomes  We reviewed that level II anatomy ultrasound is typically performed at approximately 20 weeks gestation  Level II ultrasound evaluation is between 60-80% accurate in detecting major physical birth defects and variations in fetal development that may be associated with chromosome abnormalities  Level II ultrasound evaluation is not able to detect all birth defects or health problems  After discussing the available prenatal screening and testing options Mirtha elected to pursue cell free fetal DNA screening  The QNatal cost estimate information was also provided to the patient and she was encouraged to find out how much it would be prior to getting her blood drawn  A [facility] Qnatal lab slip will be provided to the patient after her NT ultrasound to be drawn at any [address] [place] [address] lab  Results take approximately 7-10 days  The patient declined CVS and amniocentesis secondary to procedural related complications  She may reconsider diagnostic testing should the cell free fetal DNA screening come back abnormal   Mirtha is also planning on pursuing NT ultrasound, MSAFP screening and Level II ultrasound at the appropriate times      Histories for the patient and her partner's family were taken during our session  Mirtha states that her daughter was born with an atrial septal defect and follows with cardiology annually  It was diagnosed postnatally, does not yet require surgery and she is doing well at 4years of age  She also has math and reading support in school but has not received a specific diagnosis  We reviewed with the patient that an atrial septal defect (ASD) refers to an incomplete septation between the left and right atria  They account for about 13% of all congential heart defects with a prevalence of 2 per 1000 live births  Symptoms typically do not develop until early adulthood but can be present in childhood  The recurrence risk is approximately 3% when a previous child is affected  The majority of ASDs are sporadic, however more than 100 genetic and chromosomal syndromes have been described with ASD as an associated abnormality  We also discussed the availability of a fetal echocardiogram which the patient elected to pursue  Mirtha has a personal diagnosis of Stargardt's disease at age 23  She does follow with a retina specialist and reportedly has not had genetic testing  Stargardt's disease is a progressive eye disease characterized by juvenile onset macular degeneration and central vision loss  It is typically caused by mutations in the ABCA4 gene and inherited in an autosomal recessive pattern  In more rare cases it has an autosomal dominant inheritance due to a mutation in the ELOVL4 gene  We discussed that there is up to a 50% chance for the pregnancy to affected and prenatal diagnosis may be available if the familial mutations were known  We discussed the availability of possible genetic testing for the patient, or carrier screening for her partner  Mirtha declined genetic/carrier testing for herself and her partner at this time  I encouraged her to discuss genetic testing with her retina specialist if she decides she's interested  Further review of family history for the patient and her partner was noncontributory  The family history was not significant for other genetic diseases or disorders, intellectual disability, birth defects, fetal loss, or consanguinity  Patient reports being of [facility] decent and that her partner is of Spanish/[place]zeri decent  She denies either of them having known Ashkenazi Denominational ancestry  The benefits and limitations of Cystic fibrosis (CF), Spinal muscular atrophy (SMA), hemoglobinopathy, and Fragile X carrier screening was discussed  The patient declined carrier screening  She was informed that it is available to her at any time should she change her mind  Lastly, we discussed the fact that everyone in the general population regardless of age, family history, or medical background has approximately a 3-5% risk of having a child with some type of congenital anomaly, genetic disease or intellectual disability  Currently there are no tests available to rule out all birth defects or health problems  Mirtha was provided with our contact information  I encouraged her to call with any questions or concerns  Plan/Tests Ordered:  1) Patient declined CVS, amniocentesis, carrier screening, and genetic testing for Stargardt's disease  2) Patient elected cell free fetal DNA testing - QNatal labslip to be provided to patient after NT ultrasound  3) NT ultrasound scheduled for 7/7/20  4) MSAFP screening at 15-20 weeks gestation  5) Level II anatomy ultrasound at approximately 20 weeks gestation,  6) Fetal echocardiogram at 22-24 weeks gestation          HPI     Past Medical History:   Diagnosis Date    Abdominal pain     EGD today -7/2016    Abnormality of eyelid     LAST ASSESSED: [id number]    Anemia of pregnancy     Arthritis     shoulders    [facility] [address] [facility]     Drug abuse ([facility] [address] [facility]     Ganglion cyst of wrist, right     LAST ASSESSED: [id number]    Hepatitis C     dx 15 yrs ago-2015 retested told undetectable    Hepatitis C virus infection     LAST ASSESSED: [id number]    Hordeolum externum left eye, unspecified eyelid     LAST ASSESSED: [id number]    Pyelonephritis     pyelonephritis    Shingles     Stargardt's disease     Stargardt's disease     Tear of right scapholunate ligament     LAST ASSESSED: [id number]    Torn ligament     R   wrist    Varicella     as child    Wears glasses     reading       Past Surgical History:   Procedure Laterality Date     SECTION      LAST ASSESSED: [id number]    DENTAL SURGERY      INDUCED       SURIGCAL TREATMENT FOR     IN ESOPHAGOGASTRODUODENOSCOPY TRANSORAL DIAGNOSTIC N/A 2016    Procedure: ESOPHAGOGASTRODUODENOSCOPY (EGD); Surgeon: Sonja Piper MD;  Location: [facility]; Service: Gastroenterology       Current Outpatient Medications   Medication Sig Dispense Refill    albuterol (Ventolin HFA) 90 mcg/act inhaler Inhale 2 puffs every 6 (six) hours as needed for wheezing or shortness of breath 18 g 0    mesalamine (PENTASA) 500 mg CR capsule Take 1,500 mg by mouth 4 (four) times a day      sertraline (ZOLOFT) 100 mg tablet Take 1 tablet daily with 50 mg tablet  90 tablet 1    sertraline (ZOLOFT) 50 mg tablet Take 1 tablet daily with 100 mg tablet  90 tablet 1     No current facility-administered medications for this visit  No Known Allergies    Review of Systems    Video Exam    There were no vitals filed for this visit  Physical Exam     As a result of this visit, I have not referred the patient for further respiratory evaluation  I spent 65 minutes with patient today in which greater than 50% of the time was spent in counseling/coordination of care regarding the above  VIRTUAL VISIT DISCLAIMER    Mirtha ANDERSON Jorge acknowledges that she has consented to an online visit or consultation   She understands that the online visit is based solely on information provided by her, and that, in the absence of a face-to-face physical evaluation by the physician, the diagnosis she receives is both limited and provisional in terms of accuracy and completeness  This is not intended to replace a full medical face-to-face evaluation by the physician  Mirtha Patel understands and accepts these terms

## 2020-07-04 LAB
HCV RNA SERPL NAA+PROBE-ACNC: NORMAL IU/ML
TEST INFORMATION: NORMAL

## 2020-07-06 ENCOUNTER — TELEPHONE (OUTPATIENT)
Dept: PERINATAL CARE | Facility: OTHER | Age: 40
End: 2020-07-06

## 2020-07-06 ENCOUNTER — TELEPHONE (OUTPATIENT)
Dept: OBGYN CLINIC | Facility: CLINIC | Age: 40
End: 2020-07-06

## 2020-07-06 NOTE — TELEPHONE ENCOUNTER
Spoke with patient and confirmed appointment with MFM  1 support person ( must be over age of 15) may accompany you for your appointment  Are you and your support person able to wear a mask without a valve during entire appointment? NO  [facility] does not allow cell phone use, recording device or streaming during the ultrasound  Check in and rooming questions will be done via phone, when you arrive in the parking lot please call the following inside line # prior to entering office:    Paty Dunlap [phone] line: [facility] line:  [phone] line: [phone]  [address] line:  [phone]  [place] line: [phone]    Do you or your support person currently have:  Fever or flu- like symptoms NO  Symptoms of upper respiratory infection like runny nose, sore throat or cough? NO  Do you have new headache that you have not had in the past? NOHave you experienced any new shortness of breath recently? NO  Do you have any new loss of taste or smell? NODo you have any new diarrhea, nausea or vomiting? NO  Have you recently been in contact with anyone who has been sick or diagnosed with COVID-19 infection? NO  Have you been recommended to quarantine because of an exposure to a confirmed positive COVID19 person?  NOYou and your support person will be screened upon arrival     Patient verbalized understanding of all instructions   -------------------------------------------------------------

## 2020-07-07 ENCOUNTER — ROUTINE PRENATAL (OUTPATIENT)
Dept: PERINATAL CARE | Facility: OTHER | Age: 40
End: 2020-07-07
Payer: COMMERCIAL

## 2020-07-07 ENCOUNTER — DOCUMENTATION (OUTPATIENT)
Dept: PERINATAL CARE | Facility: CLINIC | Age: 40
End: 2020-07-07

## 2020-07-07 VITALS
HEART RATE: 91 BPM | BODY MASS INDEX: 25.27 KG/M2 | WEIGHT: 170.6 LBS | SYSTOLIC BLOOD PRESSURE: 136 MMHG | TEMPERATURE: 97.2 F | HEIGHT: 69 IN | DIASTOLIC BLOOD PRESSURE: 81 MMHG

## 2020-07-07 DIAGNOSIS — O99.330 TOBACCO SMOKING AFFECTING PREGNANCY, ANTEPARTUM: ICD-10-CM

## 2020-07-07 DIAGNOSIS — B18.2 CHRONIC HEPATITIS C DURING PREGNANCY, ANTEPARTUM (HCC): ICD-10-CM

## 2020-07-07 DIAGNOSIS — O98.419 CHRONIC HEPATITIS C DURING PREGNANCY, ANTEPARTUM (HCC): ICD-10-CM

## 2020-07-07 DIAGNOSIS — Z98.891 HISTORY OF CESAREAN SECTION: ICD-10-CM

## 2020-07-07 DIAGNOSIS — F32.A DEPRESSION AFFECTING PREGNANCY IN FIRST TRIMESTER, ANTEPARTUM: ICD-10-CM

## 2020-07-07 DIAGNOSIS — O35.2XX0 PREVIOUS CHILD WITH CONGENITAL ANOMALY, CURRENTLY PREGNANT, ANTEPARTUM, SINGLE OR UNSPECIFIED FETUS: ICD-10-CM

## 2020-07-07 DIAGNOSIS — O99.341 DEPRESSION AFFECTING PREGNANCY IN FIRST TRIMESTER, ANTEPARTUM: ICD-10-CM

## 2020-07-07 DIAGNOSIS — Z3A.13 13 WEEKS GESTATION OF PREGNANCY: ICD-10-CM

## 2020-07-07 DIAGNOSIS — O09.521 ELDERLY MULTIGRAVIDA, FIRST TRIMESTER: Primary | ICD-10-CM

## 2020-07-07 PROCEDURE — 76801 OB US < 14 WKS SINGLE FETUS: CPT | Performed by: OBSTETRICS & GYNECOLOGY

## 2020-07-07 PROCEDURE — 76813 OB US NUCHAL MEAS 1 GEST: CPT | Performed by: OBSTETRICS & GYNECOLOGY

## 2020-07-07 PROCEDURE — 99203 OFFICE O/P NEW LOW 30 MIN: CPT | Performed by: OBSTETRICS & GYNECOLOGY

## 2020-07-07 NOTE — LETTER
2020     Dl Worthy MD  [address]    Patient: Carlotta Coronel   YOB: 1980   Date of Visit: 2020       Dear Dr Katie Payton: Thank you for referring Mirtha Patel to me for evaluation  Below are my notes for this consultation  If you have questions, please do not hesitate to call me  I look forward to following your patient along with you  Sincerely,        Rama Viera MD        CC: No Recipients  Rama Viera MD  2020  7:17 AM  Incomplete  Thank you very much for your kind referral of Fletcher Patel for first-trimester ultrasound evaluation and MFM consult at [facility] on 2020  Ranjith Mcallister is a 53-year-old [id number] white female with uncertian menstrual dating  Consultation is performed for the indication of advanced maternal age  Her prenatal course so far has been unremarkable  Mirtha has no complaints  She denies vaginal bleeding  Screening for gestational diabetes on  revealed a normal one hour post Glucola value of 69 mg/dL  Mirtha has a history of a twin vaginal delivery at 37 weeks gestation in   Baby A was delivered vaginally, and Baby B was delivered by  section  The babies were appropriately grown  The prenatal course was unremarkable  Mirtha also has a history of a successful  at term in  following an uncomplicated prenatal course  She delivered an 8 lb 4 oz baby girl , who has a diagnosis of an ASD, followed by Pediatric Cardiology, not requiring surgery so far  Each of her children is otherwise currently healthy  Mirtha also has a history of three therapeutic abortions  She has a history of Crohns disease, currently maintained in remission with Pentasa  She has had no recent flare  Mirtha has a history of depression, currently treated with 125 mg of Zoloft today    Her past medical history is significant for early macular degeneration, for which she is followed by a retinal specialist   She has a history of IV heroin abuse, most recently in March of 2020  She is currently not treated with opioid replacement therapy  She smokes 1/2 pack of cigarettes per day, decreased from one pack per day prior to this pregnancy  Mirtha has a history of hepatitis-C virus infection, treated with her Harvoni  She has not had a recent HCV RNA viral load obtained  She is hepatitis-B surface antigen negative  Her brother has diabetes  Her mom and brother each has hypertension  There is no first-degree family member with a diagnosis of venous thromboembolism or preeclampsia  Todays ultrasound findings and suggested follow-up were discussed in detail with Mirtha  At age 41, her risk for a live-born baby with Down syndrome is one in 59, with a risk for a live-born baby with any chromosomal abnormality of one in 36  We discussed that definitive prenatal diagnosis is possible only with genetic amniocentesis or chorionic villus sampling, and discussed the small procedure-related loss for pregnancy loss in each case  We discussed the option of genetic screening using cell free DNA analysis which is not diagnostic, but which has a sensitivity for identification of Down syndrome of 99%  Mirtha has opted for genetic screening using cell free DNA analysis which will be obtained at [facility]  MSAFP screening is recommended at 16 to 20 weeks gestation  Detailed MFM fetal anatomy evaluation will be performed at 20 weeks gestation  Fetal echocardiography will be performed at 22 to 24 weeks gestation for the indication of a prior child diagnosed with a congenital heart defect  Serial fetal interval growth ultrasound studies are recommended during the second half of the pregnancy for the indications of advanced maternal age , Crohns disease, and tobacco use, each associated with an increased risk for fetal growth abnormalities    Weekly non stress testing is recommended for the indication of advanced maternal age beginning at 42 weeks gestation, sooner if otherwise clinically indicated, given an associated increased risk for stillbirth  We discussed her early pregnancy gestational diabetes screening result  Repeat screening for gestational diabetes is recommended at 24 to 28 weeks gestation  An HCV RNA level is recommended given her history of hepatitis-C virus infection, apparently treated medically successfully in the past   Gastroenterology co-management is recommended during this pregnancy for the indication of Crohns disease  Mirtha and I discussed options for opioid replacement therapy, including Subutex on a Suboxone, and methadone  At this time, she does not wish to pursue opioid replacement therapy  Opioid use disorder is a pattern of opioid use characterized by tolerance, craving, inability to control use, and continued use despite adverse consequences  It is a chronic, treatable disease that can be managed successfully by combining medications with behavioral therapy, and recovery support   During pregnancy, chronic untreated addiction to heroin is associated with lack of prenatal care, increased risk of fetal growth restriction, abruption, fetal death,  labor, and intrauterine passage of meconium  Pregnant women with opioid use disorder also suffer from co-occurring mental health conditions, particularly depression, and other psychiatric problems  Recent evidence supports the use of buprenorphine for opioid use disorder treatment during pregnancy  It is available as a monoproduct (Subutex) or any combined formulation with naloxone and opioid antagonist (Suboxone)  Testing for sexually transmitted infections and other infectious agents such as HIV,  Chlamydia, gonorrhea, syphilis, and tuberculosis should be considered  Repeat testing in the third trimester may be indicated if the patient is considered at increased risk    Hepatitis-B vaccination is recommended for pregnant women who are HBsAg negative, but at high risk of hepatitis-B infection  Tobacco use cessation services should be offered  We discussed that tobacco use is associated with an increased risk for adverse pregnancy outcomes including fetal growth restriction, abruption,  birth, and stillbirth, and is associated with an increased risk for SIDS  Kimberlyn Nichols Sertraline (Zoloft) is a serotonin reuptake inhibitor marketed for depression and anxiety disorders  Based on experimental animal studies and human experience, sertraline is not expected to increase the risk of congenital anomalies  Human studies have inconsistently reported associations of sertraline use during pregnancy and various defects in the offspring  Use of serotonin re-uptake inhibitors late in pregnancy has been associated with a mild transient  syndrome of central nervous system, motor, respiratory, and gastrointestinal signs  Use of sertraline was associated with an increased risk of  pulmonary hypertension in some but not all studies  Long-term neurodevelopmental studies suggest that  exposure to fluoxetine, sertraline, or paroxetine does not adversely affect outcome  The face to face time, in addition to time spent discussing ultrasound results, was approximately 30 minutes, greater than 50% of which was spent during counseling and coordination of care  Rama Viera MD  2020  7:07 AM  Sign at close encounter  Thank you very much for your kind referral of Fletcher Patel for first-trimester ultrasound evaluation and MFM consult at [facility] on 2020  Ranjith Mcallister is a 49-year-old  white female who is currently at 15 and 2/7 weeks gestation by an estimated due date of 2021 which is based upon menstrual dating  Consultation is performed for the indication of advanced maternal age  Her prenatal course so far has been unremarkable  Mirtha has no complaints    She denies vaginal bleeding  Screening for gestational diabetes on  revealed a normal one hour post Glucola value of 69 mg/dL  Mirtha has a history of a twin vaginal delivery at 37 weeks gestation in   Baby A was delivered vaginally, and Baby B was delivered by  section  The babies were appropriately grown  The prenatal course was unremarkable  Mirtha also has a history of a successful  at term in  following an uncomplicated prenatal course  She delivered an 8 lb 4 oz baby girl , who has a diagnosis of an ASD, followed by Pediatric Cardiology, not requiring surgery so far  Each of her children is otherwise currently healthy  Mirtha also has a history of three therapeutic abortions  She has a history of Crohns disease, currently maintained in remission with Pentasa  She has had no recent flare  Mirtha has a history of depression, currently treated with 125 mg of Zoloft today  Her past medical history is significant for early macular degeneration, for which she is followed by a retinal specialist   She has a history of IV heroin abuse, most recently in 2020  She is currently not treated with opioid replacement therapy  She smokes 1/2 pack of cigarettes per day, decreased from one pack per day prior to this pregnancy  Mirtha has a history of hepatitis-C virus infection, treated with her Harvoni  She has not had a recent HCV RNA viral load obtained  She is hepatitis-B surface antigen negative  Her brother has diabetes  Her mom and brother each has hypertension  There is no first-degree family member with a diagnosis of venous thromboembolism or preeclampsia  Todays ultrasound findings and suggested follow-up were discussed in detail with Mirtha  At age 41, her risk for a live-born baby with Down syndrome is one in 59, with a risk for a live-born baby with any chromosomal abnormality of one in 36    We discussed that definitive prenatal diagnosis is possible only with genetic amniocentesis or chorionic villus sampling, and discussed the small procedure-related loss for pregnancy loss in each case  We discussed the option of genetic screening using cell free DNA analysis which is not diagnostic, but which has a sensitivity for identification of Down syndrome of 99%  Mirtha has opted for genetic screening using cell free DNA analysis which will be obtained at [facility]  MSAFP screening is recommended at 16 to 20 weeks gestation  Detailed MFM fetal anatomy evaluation will be performed at 20 weeks gestation  Fetal echocardiography will be performed at 22 to 24 weeks gestation for the indication of a prior child diagnosed with a congenital heart defect  Serial fetal interval growth ultrasound studies are recommended during the second half of the pregnancy for the indications of advanced maternal age , Crohns disease, and tobacco use, each associated with an increased risk for fetal growth abnormalities  Weekly non stress testing is recommended for the indication of advanced maternal age beginning at 42 weeks gestation, sooner if otherwise clinically indicated, given an associated increased risk for stillbirth  We discussed her early pregnancy gestational diabetes screening result  Repeat screening for gestational diabetes is recommended at 24 to 28 weeks gestation  An HCV RNA level is recommended given her history of hepatitis-C virus infection, apparently treated medically successfully in the past   Gastroenterology co-management is recommended during this pregnancy for the indication of Crohns disease  Mirtha and I discussed options for opioid replacement therapy, including Subutex on a Suboxone, and methadone  At this time, she does not wish to pursue opioid replacement therapy   Opioid use disorder is a pattern of opioid use characterized by tolerance, craving, inability to control use, and continued use despite adverse consequences  It is a chronic, treatable disease that can be managed successfully by combining medications with behavioral therapy, and recovery support   During pregnancy, chronic untreated addiction to heroin is associated with lack of prenatal care, increased risk of fetal growth restriction, abruption, fetal death,  labor, and intrauterine passage of meconium  Pregnant women with opioid use disorder also suffer from co-occurring mental health conditions, particularly depression, and other psychiatric problems  Recent evidence supports the use of buprenorphine for opioid use disorder treatment during pregnancy  It is available as a monoproduct (Subutex) or any combined formulation with naloxone and opioid antagonist (Suboxone)  Testing for sexually transmitted infections and other infectious agents such as HIV,  Chlamydia, gonorrhea, syphilis, and tuberculosis should be considered  Repeat testing in the third trimester may be indicated if the patient is considered at increased risk  Hepatitis-B vaccination is recommended for pregnant women who are HBsAg negative, but at high risk of hepatitis-B infection  Tobacco use cessation services should be offered  We discussed that tobacco use is associated with an increased risk for adverse pregnancy outcomes including fetal growth restriction, abruption,  birth, and stillbirth, and is associated with an increased risk for SIDS  Dara Soulier Sertraline (Zoloft) is a serotonin reuptake inhibitor marketed for depression and anxiety disorders  Based on experimental animal studies and human experience, sertraline is not expected to increase the risk of congenital anomalies  Human studies have inconsistently reported associations of sertraline use during pregnancy and various defects in the offspring   Use of serotonin re-uptake inhibitors late in pregnancy has been associated with a mild transient  syndrome of central nervous system, motor, respiratory, and gastrointestinal signs  Use of sertraline was associated with an increased risk of  pulmonary hypertension in some but not all studies  Long-term neurodevelopmental studies suggest that  exposure to fluoxetine, sertraline, or paroxetine does not adversely affect outcome  The face to face time, in addition to time spent discussing ultrasound results, was approximately 30 minutes, greater than 50% of which was spent during counseling and coordination of care

## 2020-07-07 NOTE — PROGRESS NOTES
I received a call from Kim Landa at [employer]  She states that prior authorization is required for the Qnatal  She is requesting clinical notes for Mirtha to get prior authorization  I faxed notes from a virtual visit with Justa Horner, genetic counselor

## 2020-07-08 PROBLEM — O98.419 CHRONIC HEPATITIS C DURING PREGNANCY, ANTEPARTUM (HCC): Status: ACTIVE | Noted: 2020-07-08

## 2020-07-08 PROBLEM — Z98.891 HISTORY OF CESAREAN SECTION: Status: ACTIVE | Noted: 2020-07-08

## 2020-07-08 PROBLEM — O99.330 TOBACCO SMOKING AFFECTING PREGNANCY, ANTEPARTUM: Status: ACTIVE | Noted: 2020-07-08

## 2020-07-08 PROBLEM — F32.A DEPRESSION AFFECTING PREGNANCY IN FIRST TRIMESTER, ANTEPARTUM: Status: ACTIVE | Noted: 2020-07-08

## 2020-07-08 PROBLEM — O35.2XX0 PREVIOUS CHILD WITH CONGENITAL ANOMALY, CURRENTLY PREGNANT, ANTEPARTUM: Status: ACTIVE | Noted: 2020-07-08

## 2020-07-08 PROBLEM — O09.521 ELDERLY MULTIGRAVIDA, FIRST TRIMESTER: Status: ACTIVE | Noted: 2020-07-08

## 2020-07-08 PROBLEM — B18.2 CHRONIC HEPATITIS C DURING PREGNANCY, ANTEPARTUM (HCC): Status: ACTIVE | Noted: 2020-07-08

## 2020-07-08 PROBLEM — O99.341 DEPRESSION AFFECTING PREGNANCY IN FIRST TRIMESTER, ANTEPARTUM: Status: ACTIVE | Noted: 2020-07-08

## 2020-07-08 NOTE — PROGRESS NOTES
Thank you very much for your kind referral of Fletcher Patel for first-trimester ultrasound evaluation and MFM consult at [facility] on 2020  Rafat Carver is a 42-year-old [id number] white female with uncertian menstrual dating  Consultation is performed for the indication of advanced maternal age  Her prenatal course so far has been unremarkable  Mirtha has no complaints  She denies vaginal bleeding  Screening for gestational diabetes on  revealed a normal one hour post Glucola value of 69 mg/dL  Mirtha has a history of a twin vaginal delivery at 37 weeks gestation in   Baby A was delivered vaginally, and Baby B was delivered by  section  The babies were appropriately grown  The prenatal course was unremarkable  Mirtha also has a history of a successful  at term in  following an uncomplicated prenatal course  She delivered an 8 lb 4 oz baby girl , who has a diagnosis of an ASD, followed by Pediatric Cardiology, not requiring surgery so far  Each of her children is otherwise currently healthy  Mirtha also has a history of three therapeutic abortions  She has a history of Crohns disease, currently maintained in remission with Pentasa  She has had no recent flare  Mirtha has a history of depression, currently treated with 125 mg of Zoloft today  Her past medical history is significant for early macular degeneration, for which she is followed by a retinal specialist   She has a history of IV heroin abuse, most recently in 2020  She is currently not treated with opioid replacement therapy  She smokes 1/2 pack of cigarettes per day, decreased from one pack per day prior to this pregnancy  Mirtha has a history of hepatitis-C virus infection, treated with her Harvoni  She has not had a recent HCV RNA viral load obtained  She is hepatitis-B surface antigen negative  Her brother has diabetes  Her mom and brother each has hypertension    There is no first-degree family member with a diagnosis of venous thromboembolism or preeclampsia  Todays ultrasound findings and suggested follow-up were discussed in detail with Mirtha  At age 41, her risk for a live-born baby with Down syndrome is one in 59, with a risk for a live-born baby with any chromosomal abnormality of one in 36  We discussed that definitive prenatal diagnosis is possible only with genetic amniocentesis or chorionic villus sampling, and discussed the small procedure-related loss for pregnancy loss in each case  We discussed the option of genetic screening using cell free DNA analysis which is not diagnostic, but which has a sensitivity for identification of Down syndrome of 99%  Mirtha has opted for genetic screening using cell free DNA analysis which will be obtained at [facility]  MSAFP screening is recommended at 16 to 20 weeks gestation  Detailed MFM fetal anatomy evaluation will be performed at 20 weeks gestation  Fetal echocardiography will be performed at 22 to 24 weeks gestation for the indication of a prior child diagnosed with a congenital heart defect  Serial fetal interval growth ultrasound studies are recommended during the second half of the pregnancy for the indications of advanced maternal age , Crohns disease, and tobacco use, each associated with an increased risk for fetal growth abnormalities  Weekly non stress testing is recommended for the indication of advanced maternal age beginning at 42 weeks gestation, sooner if otherwise clinically indicated, given an associated increased risk for stillbirth  We discussed her early pregnancy gestational diabetes screening result  Repeat screening for gestational diabetes is recommended at 24 to 28 weeks gestation    An HCV RNA level is recommended given her history of hepatitis-C virus infection, apparently treated medically successfully in the past   Gastroenterology co-management is recommended during this pregnancy for the indication of Crohns disease  Mirtha and I discussed options for opioid replacement therapy, including Subutex on a Suboxone, and methadone  At this time, she does not wish to pursue opioid replacement therapy  Opioid use disorder is a pattern of opioid use characterized by tolerance, craving, inability to control use, and continued use despite adverse consequences  It is a chronic, treatable disease that can be managed successfully by combining medications with behavioral therapy, and recovery support   During pregnancy, chronic untreated addiction to heroin is associated with lack of prenatal care, increased risk of fetal growth restriction, abruption, fetal death,  labor, and intrauterine passage of meconium  Pregnant women with opioid use disorder also suffer from co-occurring mental health conditions, particularly depression, and other psychiatric problems  Recent evidence supports the use of buprenorphine for opioid use disorder treatment during pregnancy  It is available as a monoproduct (Subutex) or any combined formulation with naloxone and opioid antagonist (Suboxone)  Testing for sexually transmitted infections and other infectious agents such as HIV,  Chlamydia, gonorrhea, syphilis, and tuberculosis should be considered  Repeat testing in the third trimester may be indicated if the patient is considered at increased risk  Hepatitis-B vaccination is recommended for pregnant women who are HBsAg negative, but at high risk of hepatitis-B infection  Tobacco use cessation services should be offered  We discussed that tobacco use is associated with an increased risk for adverse pregnancy outcomes including fetal growth restriction, abruption,  birth, and stillbirth, and is associated with an increased risk for SIDS  [place] Simple Sertraline (Zoloft) is a serotonin reuptake inhibitor marketed for depression and anxiety disorders   Based on experimental animal studies and human experience, sertraline is not expected to increase the risk of congenital anomalies  Human studies have inconsistently reported associations of sertraline use during pregnancy and various defects in the offspring  Use of serotonin re-uptake inhibitors late in pregnancy has been associated with a mild transient  syndrome of central nervous system, motor, respiratory, and gastrointestinal signs  Use of sertraline was associated with an increased risk of  pulmonary hypertension in some but not all studies  Long-term neurodevelopmental studies suggest that  exposure to fluoxetine, sertraline, or paroxetine does not adversely affect outcome  The face to face time, in addition to time spent discussing ultrasound results, was approximately 30 minutes, greater than 50% of which was spent during counseling and coordination of care

## 2020-07-09 ENCOUNTER — ROUTINE PRENATAL (OUTPATIENT)
Dept: OBGYN CLINIC | Facility: CLINIC | Age: 40
End: 2020-07-09
Payer: COMMERCIAL

## 2020-07-09 VITALS
SYSTOLIC BLOOD PRESSURE: 130 MMHG | HEIGHT: 69 IN | BODY MASS INDEX: 25.53 KG/M2 | TEMPERATURE: 98.6 F | DIASTOLIC BLOOD PRESSURE: 74 MMHG | WEIGHT: 172.4 LBS

## 2020-07-09 DIAGNOSIS — O09.522 MULTIGRAVIDA OF ADVANCED MATERNAL AGE IN SECOND TRIMESTER: ICD-10-CM

## 2020-07-09 DIAGNOSIS — Z3A.13 13 WEEKS GESTATION OF PREGNANCY: Primary | ICD-10-CM

## 2020-07-09 PROBLEM — G43.011 INTRACTABLE MIGRAINE WITHOUT AURA AND WITH STATUS MIGRAINOSUS: Status: RESOLVED | Noted: 2019-08-29 | Resolved: 2020-07-09

## 2020-07-09 PROBLEM — B19.20 HEPATITIS C: Status: RESOLVED | Noted: 2020-06-12 | Resolved: 2020-07-09

## 2020-07-09 PROBLEM — R05.9 COUGH: Status: RESOLVED | Noted: 2020-06-12 | Resolved: 2020-07-09

## 2020-07-09 PROBLEM — B18.2 CHRONIC HEPATITIS C DURING PREGNANCY, ANTEPARTUM (HCC): Status: RESOLVED | Noted: 2020-07-08 | Resolved: 2020-07-09

## 2020-07-09 PROBLEM — H10.9 BACTERIAL CONJUNCTIVITIS: Status: RESOLVED | Noted: 2019-07-08 | Resolved: 2020-07-09

## 2020-07-09 PROBLEM — S61.452A DOG BITE OF LEFT HAND: Status: RESOLVED | Noted: 2019-10-23 | Resolved: 2020-07-09

## 2020-07-09 PROBLEM — Z87.891 PERSONAL HISTORY OF NICOTINE DEPENDENCE: Status: RESOLVED | Noted: 2020-06-12 | Resolved: 2020-07-09

## 2020-07-09 PROBLEM — H00.034 CELLULITIS OF LEFT UPPER EYELID: Status: RESOLVED | Noted: 2020-06-12 | Resolved: 2020-07-09

## 2020-07-09 PROBLEM — Z23 NEED FOR INFLUENZA VACCINATION: Status: RESOLVED | Noted: 2020-06-12 | Resolved: 2020-07-09

## 2020-07-09 PROBLEM — R68.89 FLU-LIKE SYMPTOMS: Status: RESOLVED | Noted: 2020-06-12 | Resolved: 2020-07-09

## 2020-07-09 PROBLEM — Z00.00 MEDICARE ANNUAL WELLNESS VISIT, SUBSEQUENT: Status: RESOLVED | Noted: 2020-04-20 | Resolved: 2020-07-09

## 2020-07-09 PROBLEM — F51.01 PRIMARY INSOMNIA: Status: RESOLVED | Noted: 2020-04-20 | Resolved: 2020-07-09

## 2020-07-09 PROBLEM — R53.83 FATIGUE: Status: RESOLVED | Noted: 2018-01-23 | Resolved: 2020-07-09

## 2020-07-09 PROBLEM — Z01.419 ENCOUNTER FOR ANNUAL ROUTINE GYNECOLOGICAL EXAMINATION: Status: RESOLVED | Noted: 2019-04-18 | Resolved: 2020-07-09

## 2020-07-09 PROBLEM — M25.531 WRIST PAIN, RIGHT: Status: RESOLVED | Noted: 2020-06-12 | Resolved: 2020-07-09

## 2020-07-09 PROBLEM — M25.562 ACUTE PAIN OF LEFT KNEE: Status: RESOLVED | Noted: 2019-04-08 | Resolved: 2020-07-09

## 2020-07-09 PROBLEM — Z13.220 LIPID SCREENING: Status: RESOLVED | Noted: 2019-08-29 | Resolved: 2020-07-09

## 2020-07-09 PROBLEM — B97.7 HIGH RISK HPV INFECTION: Status: RESOLVED | Noted: 2020-06-12 | Resolved: 2020-07-09

## 2020-07-09 PROBLEM — L72.3 SEBACEOUS CYST OF LEFT AXILLA: Status: RESOLVED | Noted: 2020-04-20 | Resolved: 2020-07-09

## 2020-07-09 PROBLEM — R07.89 TIGHTNESS IN CHEST: Status: RESOLVED | Noted: 2020-06-12 | Resolved: 2020-07-09

## 2020-07-09 PROBLEM — O98.419 CHRONIC HEPATITIS C DURING PREGNANCY, ANTEPARTUM (HCC): Status: RESOLVED | Noted: 2020-07-08 | Resolved: 2020-07-09

## 2020-07-09 PROBLEM — N92.6 ABNORMAL MENSES: Status: RESOLVED | Noted: 2020-06-12 | Resolved: 2020-07-09

## 2020-07-09 PROBLEM — IMO0001 CONTRACEPTION: Status: RESOLVED | Noted: 2020-06-12 | Resolved: 2020-07-09

## 2020-07-09 PROBLEM — Z12.31 ENCOUNTER FOR SCREENING MAMMOGRAM FOR MALIGNANT NEOPLASM OF BREAST: Status: RESOLVED | Noted: 2019-04-18 | Resolved: 2020-07-09

## 2020-07-09 PROBLEM — J02.9 SORE THROAT: Status: RESOLVED | Noted: 2020-06-12 | Resolved: 2020-07-09

## 2020-07-09 PROBLEM — R07.89 COSTOCHONDRAL PAIN: Status: RESOLVED | Noted: 2020-06-12 | Resolved: 2020-07-09

## 2020-07-09 PROBLEM — M67.431 GANGLION CYST OF WRIST, RIGHT: Status: RESOLVED | Noted: 2020-06-12 | Resolved: 2020-07-09

## 2020-07-09 PROBLEM — H00.019 STYE: Status: RESOLVED | Noted: 2020-06-12 | Resolved: 2020-07-09

## 2020-07-09 PROBLEM — R10.9 ABDOMINAL PAIN: Status: RESOLVED | Noted: 2020-06-12 | Resolved: 2020-07-09

## 2020-07-09 PROBLEM — R87.810 CERVICAL HIGH RISK HPV (HUMAN PAPILLOMAVIRUS) TEST POSITIVE: Status: RESOLVED | Noted: 2019-04-25 | Resolved: 2020-07-09

## 2020-07-09 PROBLEM — Z12.4 CERVICAL CANCER SCREENING: Status: RESOLVED | Noted: 2020-06-12 | Resolved: 2020-07-09

## 2020-07-09 PROBLEM — H02.9: Status: RESOLVED | Noted: 2019-04-08 | Resolved: 2020-07-09

## 2020-07-09 PROBLEM — O99.341 DEPRESSION AFFECTING PREGNANCY IN FIRST TRIMESTER, ANTEPARTUM: Status: RESOLVED | Noted: 2020-07-08 | Resolved: 2020-07-09

## 2020-07-09 PROBLEM — F32.A DEPRESSION AFFECTING PREGNANCY IN FIRST TRIMESTER, ANTEPARTUM: Status: RESOLVED | Noted: 2020-07-08 | Resolved: 2020-07-09

## 2020-07-09 PROBLEM — R29.3 POOR POSTURE: Status: RESOLVED | Noted: 2019-08-29 | Resolved: 2020-07-09

## 2020-07-09 PROBLEM — M54.2 NECK PAIN: Status: RESOLVED | Noted: 2019-08-29 | Resolved: 2020-07-09

## 2020-07-09 PROBLEM — A74.9 CHLAMYDIAL INFECTION: Status: RESOLVED | Noted: 2020-06-12 | Resolved: 2020-07-09

## 2020-07-09 PROBLEM — E66.3 OVERWEIGHT: Status: RESOLVED | Noted: 2019-08-29 | Resolved: 2020-07-09

## 2020-07-09 PROBLEM — A59.01 TRICHOMONAL VULVOVAGINITIS: Status: RESOLVED | Noted: 2020-06-12 | Resolved: 2020-07-09

## 2020-07-09 PROBLEM — M46.1 SACROILIITIS (HCC): Status: RESOLVED | Noted: 2020-06-12 | Resolved: 2020-07-09

## 2020-07-09 PROBLEM — J01.90 ACUTE SINUSITIS: Status: RESOLVED | Noted: 2020-05-20 | Resolved: 2020-07-09
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
## 2020-07-09 PROCEDURE — 99213 OFFICE O/P EST LOW 20 MIN: CPT | Performed by: OBSTETRICS & GYNECOLOGY

## 2020-07-09 NOTE — PROGRESS NOTES
Visit:  Tolerating PNV, feeling well - recent US at [facility] was good - has level II scheduled -

## 2020-07-11 LAB
# FETUSES US: 1
CFDNA.FET/TOTAL PLAS.CFDNA: NORMAL
FET CHR 13 TS PLAS.CFDNA QL: NEGATIVE
FET CHR 18 TS PLAS.CFDNA QL: NEGATIVE
FET CHR 21 TS PLAS.CFDNA QL: NEGATIVE
FET CHR X + Y ANEUP PLAS.CFDNA QL: NORMAL
FET CHROM X + Y ANEUP CFDNA IMP: NORMAL
FET Y CHROM PLAS.CFDNA QL: DETECTED
FET Y CHROM PLAS.CFDNA: NORMAL
GA (DAYS): 2 D
GA (WEEKS): 12 WK
MICRODELETION SYND BLD/T FISH: NORMAL
REF LAB TEST METHOD: NORMAL
SERVICE CMNT-IMP: NORMAL
SERVICE CMNT-IMP: NORMAL
SL AMB ABNORMAL MSS?: NO
SL AMB ABNORMAL US?: NO
SL AMB ADVANCED MATERNAL AGE?: YES
SL AMB MICRODELETION INTERP: NORMAL
SL AMB MICRODELETION: NORMAL
SL AMB PERSONAL/FAM HISTORY?: NORMAL
SL AMB SPECIFICATIONS: NORMAL

## 2020-07-21 ENCOUNTER — TELEPHONE (OUTPATIENT)
Dept: PERINATAL CARE | Facility: CLINIC | Age: 40
End: 2020-07-21

## 2020-07-21 NOTE — TELEPHONE ENCOUNTER
Called patient, no answer  No identifying information on communication consent (name, phone number) or on voicemail  Generic message left for her to call our office back  Provided genetic counseling phone number  Need to review negative NIPT result and upcoming MSAFP screening

## 2020-07-21 NOTE — LETTER
20    Mirtha ANDERSON Jorge  : 1980    Thank you for completing the cell-free fetal DNA screen ("non-invasive prenatal screening, NIPT" or "QNatal")  To obtain comprehensive screening results, please complete blood work for MSAFP (maternal-serum alpha fetoprotein) which is screening for open neural tube defects (or spina bifida)  The optimal time for testing is between 16-18 weeks of pregnancy so that we have results prior to your level II anatomy ultrasound  The dates to go for your blood draw are 20  to 20    Based on your insurance coverage, please use one of the following locations  The other option is to go to [email or web address] to find a testing center  Call our office for any questions at [phone] or [phone]            Sincerely,    Janay Alicea MS, [facility]   Genetic Counselor

## 2020-07-22 NOTE — TELEPHONE ENCOUNTER
Patient called back  Called patient and confirmed date of birth  Discussed negative cell free fetal DNA test results for Trisomy 21, 13 and 18  Patient desired to learn fetal sex and was informed that it is male; to be confirmed at anatomy ultrasound  Informed patient that MSAFP screening needs to be done between 16-18 weeks gestation and the labslip will be mailed to her  Patient expressed verbal understanding and had no questions

## 2020-07-24 ENCOUNTER — TELEPHONE (OUTPATIENT)
Dept: OBGYN CLINIC | Facility: CLINIC | Age: 40
End: 2020-07-24

## 2020-07-24 NOTE — TELEPHONE ENCOUNTER
Pt 14w5d experiencing cramping two inches above belly button started at 1400 lasted for about an hour  Pt states laid down prior to calling and when got up felt a lot better, cramping subsided  Pt states ate lunch around 12:00  Denies vaginal discharge, lower abdomen cramping or sharp stabbing pains  Reviewed with pt to stay hydrated  Reviewed with pt to call if experiences cramping again or have further questions/ concerns

## 2020-08-07 ENCOUNTER — TELEPHONE (OUTPATIENT)
Dept: OBGYN CLINIC | Facility: CLINIC | Age: 40
End: 2020-08-07

## 2020-08-12 ENCOUNTER — TELEPHONE (OUTPATIENT)
Dept: PERINATAL CARE | Facility: OTHER | Age: 40
End: 2020-08-12

## 2020-08-12 LAB
# FETUSES US: 1
AFP ADJ MOM SERPL: 1.48
AFP INTERP SERPL-IMP: NORMAL
AFP SERPL-MCNC: 48.3 NG/ML
AGE: NORMAL
DONATED EGG PATIENT QL: NO
GA CLIN EST: 16.7 WEEKS
GA METHOD: NORMAL
HX OF NTD NARR: NO
HX OF TRISOMY 21 NARR: NO
IDDM PATIENT QL: NO
NEURAL TUBE DEFECT RISK FETUS: NORMAL %
SL AMB REPEAT SPECIMEN: NO

## 2020-08-12 NOTE — TELEPHONE ENCOUNTER
I called the patient and left a voicemail, per her communication consent, with the results of her [facility]  Nurse line phone number ([phone])  left for her to call with any questions

## 2020-08-12 NOTE — TELEPHONE ENCOUNTER
----- Message from Mamie Dexter MD sent at 8/12/2020  2:12 PM EDT -----  I reviewed the lab study today and the results are normal

## 2020-08-20 ENCOUNTER — ROUTINE PRENATAL (OUTPATIENT)
Dept: OBGYN CLINIC | Facility: CLINIC | Age: 40
End: 2020-08-20
Payer: COMMERCIAL

## 2020-08-20 VITALS
HEIGHT: 69 IN | DIASTOLIC BLOOD PRESSURE: 80 MMHG | SYSTOLIC BLOOD PRESSURE: 128 MMHG | TEMPERATURE: 98 F | WEIGHT: 189 LBS | BODY MASS INDEX: 27.99 KG/M2

## 2020-08-20 DIAGNOSIS — Z3A.19 19 WEEKS GESTATION OF PREGNANCY: Primary | ICD-10-CM

## 2020-08-20 PROBLEM — Z3A.18 18 WEEKS GESTATION OF PREGNANCY: Status: ACTIVE | Noted: 2020-06-12

## 2020-08-20 PROCEDURE — 99213 OFFICE O/P EST LOW 20 MIN: CPT | Performed by: OBSTETRICS & GYNECOLOGY

## 2020-08-20 PROCEDURE — 3008F BODY MASS INDEX DOCD: CPT | Performed by: OBSTETRICS & GYNECOLOGY

## 2020-08-20 NOTE — PROGRESS NOTES
Visit:  Good FM - no complaints - has f/u level II US - tolerating PNV - changed EDC to agree with early US scans -

## 2020-09-02 ENCOUNTER — OFFICE VISIT (OUTPATIENT)
Dept: FAMILY MEDICINE CLINIC | Facility: CLINIC | Age: 40
End: 2020-09-02

## 2020-09-02 ENCOUNTER — TELEPHONE (OUTPATIENT)
Dept: PERINATAL CARE | Facility: CLINIC | Age: 40
End: 2020-09-02

## 2020-09-02 VITALS
WEIGHT: 185.4 LBS | OXYGEN SATURATION: 97 % | SYSTOLIC BLOOD PRESSURE: 126 MMHG | TEMPERATURE: 99.6 F | RESPIRATION RATE: 16 BRPM | HEIGHT: 69 IN | DIASTOLIC BLOOD PRESSURE: 80 MMHG | HEART RATE: 100 BPM | BODY MASS INDEX: 27.46 KG/M2

## 2020-09-02 DIAGNOSIS — Z3A.21 21 WEEKS GESTATION OF PREGNANCY: ICD-10-CM

## 2020-09-02 DIAGNOSIS — F32.A DEPRESSION, UNSPECIFIED DEPRESSION TYPE: ICD-10-CM

## 2020-09-02 DIAGNOSIS — L30.9 DERMATITIS: Primary | ICD-10-CM

## 2020-09-02 DIAGNOSIS — Z76.0 MEDICATION REFILL: ICD-10-CM

## 2020-09-02 PROBLEM — Z3A.19 19 WEEKS GESTATION OF PREGNANCY: Status: RESOLVED | Noted: 2020-06-12 | Resolved: 2020-09-02

## 2020-09-02 PROCEDURE — 99214 OFFICE O/P EST MOD 30 MIN: CPT | Performed by: PHYSICIAN ASSISTANT

## 2020-09-02 RX ORDER — SERTRALINE HYDROCHLORIDE 100 MG/1
TABLET, FILM COATED ORAL
Qty: 90 TABLET | Refills: 1 | Status: SHIPPED | OUTPATIENT
Start: 2020-09-02 | End: 2021-01-15 | Stop reason: SDUPTHER

## 2020-09-02 RX ORDER — SERTRALINE HYDROCHLORIDE 25 MG/1
TABLET, FILM COATED ORAL
Qty: 90 TABLET | Refills: 1 | Status: SHIPPED | OUTPATIENT
Start: 2020-09-02 | End: 2021-10-25 | Stop reason: ALTCHOICE

## 2020-09-02 NOTE — TELEPHONE ENCOUNTER
Attempted to reach patient by phone and left voicemail to confirm appointment for MFM ultrasound  1 support person ( must be over the age of 15) may accompany you for your appointment  If you or your support person have traveled outside the state in the past 2 weeks, please call and notify our office today #[phone]  You and your support person must wear a mask ,covering nose and mouth,during your entire visit  You and your support person will have temperature screened upon arrival     To minimize your exposure in our waiting room, please call our office prior to entering the building  Check in and rooming questions will be done via phone  We will give you directions when to enter for your appointment  Inside office # provided:    Silvia Walton line:  [phone]      IF you are not feeling well- cough, fever, shortness of breath or any flu like symptoms, contact your primary care physician or [address] Leonel Katz    Any questions with these instructions please call Maternal Fetal Medicine nurse line today @ # [phone]

## 2020-09-02 NOTE — PROGRESS NOTES
Assessment/Plan:    I did recommend over-the-counter hydrocortisone cream mixed with the Benadryl cream and oral Benadryl for the lesions for the posterior thigh  I did advised to call her OB specialist 1st before taking the medications  I also advised her that it could possibly be herpes zoster  She has had the lesions for 4 days no which the antivirals would not be effective if indeed it is zoster   -I did advise her that the lesions on her right side appear to be more of a heat rash which will resolve on its own  -continue follow-up with the [facility] specialist as advised  She does have an ultrasound routinely Ob tomorrow   -continue the Zoloft  She states that she did check with her OB specialist who advised her to reduce the dose from 150 mg down the 125 mg which has been effective  -recommend follow-up in February for depression    [employer] software was used to dictate this note  It may contain errors with dictating incorrect words/spelling  Please contact provider directly for any questions  Diagnoses and all orders for this visit:    Dermatitis    Depression, unspecified depression type  -     sertraline (ZOLOFT) 100 mg tablet; Take 1 tablet daily with 50 mg tablet  Medication refill  -     sertraline (ZOLOFT) 25 mg tablet; Take 1 tablet daily with 100 mg tablet  21 weeks gestation of pregnancy          Subjective:      Patient ID: Mirtha Patel is a 41 y o  female  Patient presents today for acute visit for evaluation of a rash on the left posterior thigh that is been there for about 4 days  She states is very itchy  She denies any pain  She denies any known contacts  The rash has been traveling up the thigh  She also has a different type of rash on her right side with no itching  She is currently 21 weeks pregnant  She is scheduled for another routine OB ultrasound tomorrow  She also states that she needs a refill of her sertraline    She states that the [facility] specialist advised her to reduce it down to 125 mg from 150 mg  She states her symptoms have been very stable on this dose  The following portions of the patient's history were reviewed and updated as appropriate:   She  has a past medical history of Abdominal pain, Abnormality of eyelid, Anemia of pregnancy, Arthritis, Crohn disease ([facility] ), Drug abuse ([facility] ), Ganglion cyst of wrist, right, Hepatitis C, Hepatitis C virus infection, Hordeolum externum left eye, unspecified eyelid, Pyelonephritis, Shingles, Stargardt's disease, Stargardt's disease, Tear of right scapholunate ligament, Torn ligament, Varicella, and Wears glasses  She   Patient Active Problem List    Diagnosis Date Noted    Dermatitis 2020    21 weeks gestation of pregnancy 2020   Aura Lesa of advanced maternal age in second trimester 2020    Tobacco smoking affecting pregnancy, antepartum 2020    History of  section 2020    Previous child with congenital anomaly, currently pregnant, antepartum 2020    Cigarette nicotine dependence without complication     History of hepatitis C 2019    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, intellectual disability, and dysmorphic features ([facility] ) 2019    Crohn's disease without complication ([facility] )     Renal cyst 2017    Depression 2015    History of heroin abuse ([place] [address] ) 2015    Hemorrhoid 2015     She  has a past surgical history that includes  section; Dental surgery; pr esophagogastroduodenoscopy transoral diagnostic (N/A, 2016); and Induced   Her family history includes Bipolar disorder in her brother; Cancer in her mother; Diabetes in her brother; Heart attack in her mother; Heart defect in her daughter; Hypertension in her brother; Kidney disease in her brother; No Known Problems in her brother, daughter, and son; Seizures in her father    She  reports that she has been smoking cigarettes  She has a 12 50 pack-year smoking history  She has never used smokeless tobacco  She reports that she does not drink alcohol or use drugs  Current Outpatient Medications   Medication Sig Dispense Refill    albuterol (Ventolin HFA) 90 mcg/act inhaler Inhale 2 puffs every 6 (six) hours as needed for wheezing or shortness of breath 18 g 0    mesalamine (PENTASA) 500 mg CR capsule Take 1 capsule (500 mg total) by mouth 3 (three) times a day 270 capsule 3    sertraline (ZOLOFT) 100 mg tablet Take 1 tablet daily with 50 mg tablet  90 tablet 1    sertraline (ZOLOFT) 25 mg tablet Take 1 tablet daily with 100 mg tablet  90 tablet 1     No current facility-administered medications for this visit  Current Outpatient Medications on File Prior to Visit   Medication Sig    albuterol (Ventolin HFA) 90 mcg/act inhaler Inhale 2 puffs every 6 (six) hours as needed for wheezing or shortness of breath    mesalamine (PENTASA) 500 mg CR capsule Take 1 capsule (500 mg total) by mouth 3 (three) times a day    [DISCONTINUED] sertraline (ZOLOFT) 100 mg tablet Take 1 tablet daily with 50 mg tablet   [DISCONTINUED] sertraline (ZOLOFT) 50 mg tablet Take 1 tablet daily with 100 mg tablet  No current facility-administered medications on file prior to visit  She has No Known Allergies       Review of Systems   Skin:        As stated in HPI   Psychiatric/Behavioral:        As stated in HPI         Objective:      /80 (BP Location: Left arm, Patient Position: Sitting, Cuff Size: Large)   Pulse 100   Temp 99 6 °F (37 6 °C) (Tympanic)   Resp 16   Ht 5' 9" (1 753 m)   Wt 84 1 kg (185 lb 6 4 oz)   LMP 04/12/2020 (Approximate)   SpO2 97%   BMI 27 38 kg/m²          Physical Exam  Constitutional:       General: She is not in acute distress  Appearance: Normal appearance  She is not ill-appearing, toxic-appearing or diaphoretic     HENT:      Right Ear: Tympanic membrane, ear canal and external ear normal       Left Ear: Tympanic membrane, ear canal and external ear normal    Neck:      Musculoskeletal: Neck supple  Cardiovascular:      Rate and Rhythm: Normal rate and regular rhythm  Heart sounds: No murmur  Pulmonary:      Effort: Pulmonary effort is normal  No respiratory distress  Breath sounds: Normal breath sounds  No wheezing, rhonchi or rales  Lymphadenopathy:      Cervical: No cervical adenopathy  Skin:     General: Skin is warm  Comments: She does have multiple half to 1 cm erythematous lesions of the left posterior thigh, some with scabbing  There is no surrounding erythema or discharge  Multiple erythematous 1-2 mm circular lesions on the right side  Looks more like a heat rash  Neurological:      General: No focal deficit present  Mental Status: She is alert     Psychiatric:         Mood and Affect: Mood normal

## 2020-09-02 NOTE — PATIENT INSTRUCTIONS
Thank you for choosing us for your  care today  If you have any questions about your ultrasound or care, please do not hesitate to contact us or your primary obstetrician  Some general instructions for your pregnancy are:     Exercise: Aim for 22 minutes per day (150 minutes per week!) of regular exercise  This is obviously hard to do during a pandemic but walking is great   Nutrition: aim for calcium-rich and iron-rich foods as well as healthy sources of protein  This will help you gain a healthy amount of weight   Protect against the flu: get yourself and your entire household vaccinated against influenza   Protect against coronavirus: practice social distancing, wear a mask in public, and wash your hands often  This virus can be spread easily by people without symptoms  Notify your primary care doctor if you have any symptoms including cough, shortness of breath or difficulty breathing, fever, chills, muscle pain, sore throat, or new loss of taste or smell   Learn about Preeclampsia: preeclampsia is a common, serious complication in pregnancy  A blood pressure of 140mmHg (top number or systolic) OR 00LUNC (bottom number or diastolic) is not normal and needs evaluation by your doctor   If you smoke, try to reduce how many cigarettes you smoke or quit completely  Do not vape   Other warning signs to watch out for in pregnancy or postpartum: chest pain, obstructed breathing or shortness of breath, seizures, thoughts of hurting yourself or your baby, bleeding, a painful or swollen leg, fever, or headache ([facility] POST-BIRTH Warning Signs campaign)  If these happen call 911  Itching is also not normal in pregnancy and if you experience this, especially over your hands and feet, potentially worse at night, notify your doctors

## 2020-09-03 ENCOUNTER — ROUTINE PRENATAL (OUTPATIENT)
Dept: PERINATAL CARE | Facility: OTHER | Age: 40
End: 2020-09-03
Payer: COMMERCIAL

## 2020-09-03 VITALS
WEIGHT: 184 LBS | SYSTOLIC BLOOD PRESSURE: 127 MMHG | DIASTOLIC BLOOD PRESSURE: 80 MMHG | TEMPERATURE: 97.2 F | HEART RATE: 80 BPM | BODY MASS INDEX: 27.25 KG/M2 | HEIGHT: 69 IN

## 2020-09-03 DIAGNOSIS — O35.2XX0 PREVIOUS CHILD WITH CONGENITAL ANOMALY, CURRENTLY PREGNANT, ANTEPARTUM, SINGLE OR UNSPECIFIED FETUS: ICD-10-CM

## 2020-09-03 DIAGNOSIS — Z36.86 ENCOUNTER FOR ANTENATAL SCREENING FOR CERVICAL LENGTH: ICD-10-CM

## 2020-09-03 DIAGNOSIS — O99.330 TOBACCO SMOKING AFFECTING PREGNANCY, ANTEPARTUM: Primary | ICD-10-CM

## 2020-09-03 DIAGNOSIS — Z36.3 ENCOUNTER FOR ANTENATAL SCREENING FOR MALFORMATIONS: ICD-10-CM

## 2020-09-03 PROCEDURE — 99213 OFFICE O/P EST LOW 20 MIN: CPT | Performed by: OBSTETRICS & GYNECOLOGY

## 2020-09-03 PROCEDURE — 76811 OB US DETAILED SNGL FETUS: CPT | Performed by: OBSTETRICS & GYNECOLOGY

## 2020-09-03 PROCEDURE — 76817 TRANSVAGINAL US OBSTETRIC: CPT | Performed by: OBSTETRICS & GYNECOLOGY

## 2020-09-03 NOTE — PROGRESS NOTES
A transvaginal ultrasound was performed  Sonographer note on use of High Level Disinfection process (Trophon) for transvaginal probe #1 used, serial [id number]     Bebeto FINK, MELISSA, RVT

## 2020-09-03 NOTE — PROGRESS NOTES
[address]: Ms Mirtha Ortega was seen today at 21w4d for anatomic survey and cervical length screening ultrasound  See ultrasound report under "OB Procedures" tab  Please don't hesitate to contact our office with any concerns or questions    Shaggy Rm MD

## 2020-09-23 ENCOUNTER — ROUTINE PRENATAL (OUTPATIENT)
Dept: OBGYN CLINIC | Facility: CLINIC | Age: 40
End: 2020-09-23
Payer: COMMERCIAL

## 2020-09-23 VITALS
BODY MASS INDEX: 27.7 KG/M2 | DIASTOLIC BLOOD PRESSURE: 70 MMHG | SYSTOLIC BLOOD PRESSURE: 128 MMHG | HEIGHT: 69 IN | WEIGHT: 187 LBS | TEMPERATURE: 97.8 F

## 2020-09-23 DIAGNOSIS — Z34.90 ENCOUNTER FOR SUPERVISION OF NORMAL PREGNANCY, ANTEPARTUM, UNSPECIFIED GRAVIDITY: Primary | ICD-10-CM

## 2020-09-23 PROBLEM — Z3A.21 21 WEEKS GESTATION OF PREGNANCY: Status: RESOLVED | Noted: 2020-09-02 | Resolved: 2020-09-23

## 2020-09-23 PROCEDURE — 99213 OFFICE O/P EST LOW 20 MIN: CPT | Performed by: PHYSICIAN ASSISTANT

## 2020-09-23 NOTE — PROGRESS NOTES
VISIT: (+) cramping - either side of pubic bone that comes and goes; Denies n/v/HA/vb/lof/edema/dv; (+) smoking - 12-13 cigs/day (has been trying to decrease - started out beginning of the pregnancy at 13 cigs/day); also had a discussion with Dr Ayden Knapp at last visit to decrease to 10-12 cigs/day by her next [facility] visit; She should be having a fetal echo this Monday and follow-up growth at 28 weeks; urine neg/neg  PNVs + DHA - tolerating daily  Good FM; Slip given and reviewed for 28 week labs  Patient questioned Pentasa and breastfeeding - after consulting Lactation in [address] - reviewed with patient caution advised while breastfeeding; low risk of infant diarrhea based on conflicting human data; no human data available to assess effects on milk production  Encourage patient to review with prescribing physician as well as pediatrician    RTO in 4 weeks for routine ob check or sooner if needed - patient needs a colpo in pregnancy

## 2020-09-25 ENCOUNTER — TELEPHONE (OUTPATIENT)
Dept: PERINATAL CARE | Facility: CLINIC | Age: 40
End: 2020-09-25

## 2020-09-25 NOTE — TELEPHONE ENCOUNTER
Attempted to reach patient by phone and left voicemail to confirm appointment for MFM ultrasound  1 support person ( must be over the age of 15) may accompany you for your appointment  If you or your support person have traveled outside the state in the past 2 weeks, please call and notify our office today #[phone]  You and your support person must wear a mask ,covering nose and mouth,during your entire visit  You and your support person will have temperature screened upon arrival     To minimize your exposure in our waiting room, please call our office prior to entering the building  Check in and rooming questions will be done via phone  We will give you directions when to enter for your appointment  Inside office # provided:    Pamela Carranza line:  [phone]      IF you are not feeling well- cough, fever, shortness of breath or any flu like symptoms, contact your primary care physician or [phone] [place] [address]    Any questions with these instructions please call Maternal Fetal Medicine nurse line today @ # [phone]

## 2020-10-02 ENCOUNTER — TELEPHONE (OUTPATIENT)
Dept: PERINATAL CARE | Facility: CLINIC | Age: 40
End: 2020-10-02

## 2020-10-04 ENCOUNTER — NURSE TRIAGE (OUTPATIENT)
Dept: OTHER | Facility: OTHER | Age: 40
End: 2020-10-04

## 2020-10-04 DIAGNOSIS — Z20.828 EXPOSURE TO SARS-ASSOCIATED CORONAVIRUS: ICD-10-CM

## 2020-10-04 DIAGNOSIS — Z20.828 EXPOSURE TO SARS-ASSOCIATED CORONAVIRUS: Primary | ICD-10-CM

## 2020-10-04 PROCEDURE — U0003 INFECTIOUS AGENT DETECTION BY NUCLEIC ACID (DNA OR RNA); SEVERE ACUTE RESPIRATORY SYNDROME CORONAVIRUS 2 (SARS-COV-2) (CORONAVIRUS DISEASE [COVID-19]), AMPLIFIED PROBE TECHNIQUE, MAKING USE OF HIGH THROUGHPUT TECHNOLOGIES AS DESCRIBED BY CMS-2020-01-R: HCPCS | Performed by: PHYSICIAN ASSISTANT

## 2020-10-05 ENCOUNTER — TELEMEDICINE (OUTPATIENT)
Dept: FAMILY MEDICINE CLINIC | Facility: CLINIC | Age: 40
End: 2020-10-05
Payer: COMMERCIAL

## 2020-10-05 ENCOUNTER — TELEPHONE (OUTPATIENT)
Dept: PERINATAL CARE | Facility: OTHER | Age: 40
End: 2020-10-05

## 2020-10-05 VITALS — TEMPERATURE: 97.3 F

## 2020-10-05 DIAGNOSIS — R05.9 COUGH: Primary | ICD-10-CM

## 2020-10-05 DIAGNOSIS — O09.522 MULTIGRAVIDA OF ADVANCED MATERNAL AGE IN SECOND TRIMESTER: ICD-10-CM

## 2020-10-05 DIAGNOSIS — R19.7 VOMITING AND DIARRHEA: ICD-10-CM

## 2020-10-05 DIAGNOSIS — R11.10 VOMITING AND DIARRHEA: ICD-10-CM

## 2020-10-05 LAB — SARS-COV-2 RNA SPEC QL NAA+PROBE: NOT DETECTED

## 2020-10-05 PROCEDURE — 99214 OFFICE O/P EST MOD 30 MIN: CPT | Performed by: PHYSICIAN ASSISTANT

## 2020-10-05 RX ORDER — AZITHROMYCIN 250 MG/1
TABLET, FILM COATED ORAL
Qty: 6 TABLET | Refills: 0 | Status: SHIPPED | OUTPATIENT
Start: 2020-10-05 | End: 2020-10-10

## 2020-10-06 ENCOUNTER — TELEPHONE (OUTPATIENT)
Dept: PERINATAL CARE | Facility: CLINIC | Age: 40
End: 2020-10-06

## 2020-10-06 ENCOUNTER — TELEPHONE (OUTPATIENT)
Dept: PERINATAL CARE | Facility: OTHER | Age: 40
End: 2020-10-06

## 2020-10-06 ENCOUNTER — TELEPHONE (OUTPATIENT)
Dept: FAMILY MEDICINE CLINIC | Facility: CLINIC | Age: 40
End: 2020-10-06

## 2020-10-07 ENCOUNTER — ROUTINE PRENATAL (OUTPATIENT)
Dept: PERINATAL CARE | Facility: OTHER | Age: 40
End: 2020-10-07
Payer: COMMERCIAL

## 2020-10-07 VITALS
DIASTOLIC BLOOD PRESSURE: 76 MMHG | BODY MASS INDEX: 27.91 KG/M2 | HEART RATE: 89 BPM | SYSTOLIC BLOOD PRESSURE: 122 MMHG | HEIGHT: 69 IN | WEIGHT: 188.4 LBS | TEMPERATURE: 97.6 F

## 2020-10-07 DIAGNOSIS — O35.2XX0 PREVIOUS CHILD WITH CONGENITAL ANOMALY, CURRENTLY PREGNANT, ANTEPARTUM, SINGLE OR UNSPECIFIED FETUS: ICD-10-CM

## 2020-10-07 DIAGNOSIS — Z3A.26 26 WEEKS GESTATION OF PREGNANCY: Primary | ICD-10-CM

## 2020-10-07 PROCEDURE — 93325 DOPPLER ECHO COLOR FLOW MAPG: CPT | Performed by: OBSTETRICS & GYNECOLOGY

## 2020-10-07 PROCEDURE — 76825 ECHO EXAM OF FETAL HEART: CPT | Performed by: OBSTETRICS & GYNECOLOGY

## 2020-10-07 PROCEDURE — 76827 ECHO EXAM OF FETAL HEART: CPT | Performed by: OBSTETRICS & GYNECOLOGY

## 2020-10-07 PROCEDURE — 76821 MIDDLE CEREBRAL ARTERY ECHO: CPT | Performed by: OBSTETRICS & GYNECOLOGY

## 2020-10-07 PROCEDURE — 76820 UMBILICAL ARTERY ECHO: CPT | Performed by: OBSTETRICS & GYNECOLOGY

## 2020-10-16 ENCOUNTER — TELEPHONE (OUTPATIENT)
Dept: PERINATAL CARE | Facility: CLINIC | Age: 40
End: 2020-10-16

## 2020-10-17 ENCOUNTER — LAB (OUTPATIENT)
Dept: LAB | Facility: HOSPITAL | Age: 40
End: 2020-10-17
Payer: COMMERCIAL

## 2020-10-17 DIAGNOSIS — Z34.90 ENCOUNTER FOR SUPERVISION OF NORMAL PREGNANCY, ANTEPARTUM, UNSPECIFIED GRAVIDITY: ICD-10-CM

## 2020-10-17 DIAGNOSIS — F32.A DEPRESSION, UNSPECIFIED DEPRESSION TYPE: Primary | ICD-10-CM

## 2020-10-17 LAB
BASOPHILS # BLD AUTO: 0.05 THOUSANDS/ΜL (ref 0–0.1)
BASOPHILS NFR BLD AUTO: 1 % (ref 0–1)
EOSINOPHIL # BLD AUTO: 0.08 THOUSAND/ΜL (ref 0–0.61)
EOSINOPHIL NFR BLD AUTO: 1 % (ref 0–6)
ERYTHROCYTE [DISTWIDTH] IN BLOOD BY AUTOMATED COUNT: 13.1 % (ref 11.6–15.1)
GLUCOSE 1H P 50 G GLC PO SERPL-MCNC: 138 MG/DL
HCT VFR BLD AUTO: 35.5 % (ref 34.8–46.1)
HGB BLD-MCNC: 11.7 G/DL (ref 11.5–15.4)
IMM GRANULOCYTES # BLD AUTO: 0.21 THOUSAND/UL (ref 0–0.2)
IMM GRANULOCYTES NFR BLD AUTO: 2 % (ref 0–2)
LYMPHOCYTES # BLD AUTO: 1.28 THOUSANDS/ΜL (ref 0.6–4.47)
LYMPHOCYTES NFR BLD AUTO: 14 % (ref 14–44)
MCH RBC QN AUTO: 31.5 PG (ref 26.8–34.3)
MCHC RBC AUTO-ENTMCNC: 33 G/DL (ref 31.4–37.4)
MCV RBC AUTO: 95 FL (ref 82–98)
MONOCYTES # BLD AUTO: 0.79 THOUSAND/ΜL (ref 0.17–1.22)
MONOCYTES NFR BLD AUTO: 9 % (ref 4–12)
NEUTROPHILS # BLD AUTO: 6.86 THOUSANDS/ΜL (ref 1.85–7.62)
NEUTS SEG NFR BLD AUTO: 73 % (ref 43–75)
NRBC BLD AUTO-RTO: 0 /100 WBCS
PLATELET # BLD AUTO: 265 THOUSANDS/UL (ref 149–390)
PMV BLD AUTO: 9.7 FL (ref 8.9–12.7)
RBC # BLD AUTO: 3.72 MILLION/UL (ref 3.81–5.12)
WBC # BLD AUTO: 9.27 THOUSAND/UL (ref 4.31–10.16)

## 2020-10-17 PROCEDURE — 36415 COLL VENOUS BLD VENIPUNCTURE: CPT

## 2020-10-17 PROCEDURE — 86592 SYPHILIS TEST NON-TREP QUAL: CPT

## 2020-10-17 PROCEDURE — 85025 COMPLETE CBC W/AUTO DIFF WBC: CPT

## 2020-10-17 PROCEDURE — 82950 GLUCOSE TEST: CPT

## 2020-10-19 ENCOUNTER — ULTRASOUND (OUTPATIENT)
Dept: PERINATAL CARE | Facility: OTHER | Age: 40
End: 2020-10-19
Payer: COMMERCIAL

## 2020-10-19 VITALS
HEIGHT: 69 IN | SYSTOLIC BLOOD PRESSURE: 121 MMHG | WEIGHT: 187.8 LBS | TEMPERATURE: 97.5 F | HEART RATE: 88 BPM | BODY MASS INDEX: 27.81 KG/M2 | DIASTOLIC BLOOD PRESSURE: 71 MMHG

## 2020-10-19 DIAGNOSIS — O99.810 ABNORMAL GLUCOSE AFFECTING PREGNANCY: ICD-10-CM

## 2020-10-19 DIAGNOSIS — Z65.8 PSYCHOSOCIAL STRESSORS: ICD-10-CM

## 2020-10-19 DIAGNOSIS — Z36.89 ENCOUNTER FOR ULTRASOUND TO CHECK FETAL GROWTH: ICD-10-CM

## 2020-10-19 DIAGNOSIS — O34.219 HISTORY OF CESAREAN DELIVERY, ANTEPARTUM: ICD-10-CM

## 2020-10-19 DIAGNOSIS — IMO0002 EVALUATE ANATOMY NOT SEEN ON PRIOR SONOGRAM: ICD-10-CM

## 2020-10-19 DIAGNOSIS — O09.523 MULTIGRAVIDA OF ADVANCED MATERNAL AGE IN THIRD TRIMESTER: Primary | ICD-10-CM

## 2020-10-19 DIAGNOSIS — O99.330 TOBACCO SMOKING AFFECTING PREGNANCY, ANTEPARTUM: ICD-10-CM

## 2020-10-19 DIAGNOSIS — O99.333 TOBACCO SMOKING AFFECTING PREGNANCY IN THIRD TRIMESTER: ICD-10-CM

## 2020-10-19 LAB — RPR SER QL: NORMAL

## 2020-10-19 PROCEDURE — 76816 OB US FOLLOW-UP PER FETUS: CPT | Performed by: OBSTETRICS & GYNECOLOGY

## 2020-10-19 PROCEDURE — 99212 OFFICE O/P EST SF 10 MIN: CPT | Performed by: OBSTETRICS & GYNECOLOGY

## 2020-10-20 ENCOUNTER — TELEPHONE (OUTPATIENT)
Dept: OBGYN CLINIC | Facility: CLINIC | Age: 40
End: 2020-10-20

## 2020-10-21 DIAGNOSIS — O99.810 ABNORMAL GLUCOSE AFFECTING PREGNANCY: Primary | ICD-10-CM

## 2020-10-26 ENCOUNTER — ROUTINE PRENATAL (OUTPATIENT)
Dept: OBGYN CLINIC | Facility: CLINIC | Age: 40
End: 2020-10-26
Payer: COMMERCIAL

## 2020-10-26 VITALS
SYSTOLIC BLOOD PRESSURE: 122 MMHG | DIASTOLIC BLOOD PRESSURE: 68 MMHG | HEIGHT: 69 IN | BODY MASS INDEX: 28.29 KG/M2 | TEMPERATURE: 96.5 F | WEIGHT: 191 LBS

## 2020-10-26 DIAGNOSIS — O99.810 ABNORMAL GLUCOSE TOLERANCE TEST (GTT) DURING PREGNANCY, ANTEPARTUM: ICD-10-CM

## 2020-10-26 DIAGNOSIS — O99.330 TOBACCO SMOKING AFFECTING PREGNANCY, ANTEPARTUM: ICD-10-CM

## 2020-10-26 DIAGNOSIS — O35.2XX0 PREVIOUS CHILD WITH CONGENITAL ANOMALY, CURRENTLY PREGNANT, ANTEPARTUM, SINGLE OR UNSPECIFIED FETUS: Primary | ICD-10-CM

## 2020-10-26 DIAGNOSIS — F32.A DEPRESSION AFFECTING PREGNANCY IN THIRD TRIMESTER, ANTEPARTUM: ICD-10-CM

## 2020-10-26 DIAGNOSIS — Z3A.29 29 WEEKS GESTATION OF PREGNANCY: ICD-10-CM

## 2020-10-26 DIAGNOSIS — O09.523 MULTIGRAVIDA OF ADVANCED MATERNAL AGE IN THIRD TRIMESTER: ICD-10-CM

## 2020-10-26 DIAGNOSIS — F41.9 ANXIETY IN PREGNANCY IN THIRD TRIMESTER, ANTEPARTUM: ICD-10-CM

## 2020-10-26 DIAGNOSIS — O99.343 ANXIETY IN PREGNANCY IN THIRD TRIMESTER, ANTEPARTUM: ICD-10-CM

## 2020-10-26 DIAGNOSIS — O99.343 DEPRESSION AFFECTING PREGNANCY IN THIRD TRIMESTER, ANTEPARTUM: ICD-10-CM

## 2020-10-26 PROCEDURE — 90686 IIV4 VACC NO PRSV 0.5 ML IM: CPT

## 2020-10-26 PROCEDURE — 99215 OFFICE O/P EST HI 40 MIN: CPT

## 2020-10-26 PROCEDURE — G0008 ADMIN INFLUENZA VIRUS VAC: HCPCS

## 2020-10-26 RX ORDER — HYDROXYZINE HYDROCHLORIDE 25 MG/1
12.5 TABLET, FILM COATED ORAL 3 TIMES DAILY PRN
Qty: 30 TABLET | Refills: 0 | Status: SHIPPED | OUTPATIENT
Start: 2020-10-26 | End: 2020-11-18 | Stop reason: ALTCHOICE

## 2020-10-28 ENCOUNTER — TELEPHONE (OUTPATIENT)
Dept: OBGYN CLINIC | Facility: CLINIC | Age: 40
End: 2020-10-28

## 2020-10-29 ENCOUNTER — APPOINTMENT (EMERGENCY)
Dept: RADIOLOGY | Facility: HOSPITAL | Age: 40
End: 2020-10-29
Payer: COMMERCIAL

## 2020-10-29 ENCOUNTER — HOSPITAL ENCOUNTER (EMERGENCY)
Facility: HOSPITAL | Age: 40
Discharge: HOME | End: 2020-10-29
Attending: EMERGENCY MEDICINE | Admitting: EMERGENCY MEDICINE
Payer: COMMERCIAL

## 2020-10-29 VITALS
DIASTOLIC BLOOD PRESSURE: 52 MMHG | HEART RATE: 88 BPM | RESPIRATION RATE: 19 BRPM | OXYGEN SATURATION: 99 % | TEMPERATURE: 98.3 F | SYSTOLIC BLOOD PRESSURE: 106 MMHG

## 2020-10-29 DIAGNOSIS — Z34.93 THIRD TRIMESTER PREGNANCY: ICD-10-CM

## 2020-10-29 DIAGNOSIS — D72.829 LEUKOCYTOSIS: ICD-10-CM

## 2020-10-29 DIAGNOSIS — E87.1 HYPONATREMIA: ICD-10-CM

## 2020-10-29 DIAGNOSIS — R07.89 ATYPICAL CHEST PAIN: Primary | ICD-10-CM

## 2020-10-29 DIAGNOSIS — F41.9 ANXIETY: ICD-10-CM

## 2020-10-29 LAB
ALBUMIN SERPL BCP-MCNC: 2.6 G/DL (ref 3.5–5)
ALP SERPL-CCNC: 57 U/L (ref 46–116)
ALT SERPL W P-5'-P-CCNC: 17 U/L (ref 12–78)
AMORPH PHOS CRY URNS QL MICRO: ABNORMAL /HPF
ANION GAP SERPL CALCULATED.3IONS-SCNC: 9 MMOL/L (ref 4–13)
AST SERPL W P-5'-P-CCNC: 10 U/L (ref 5–45)
ATRIAL RATE: 82 BPM
BACTERIA UR QL AUTO: ABNORMAL /HPF
BASOPHILS # BLD AUTO: 0.05 THOUSANDS/ΜL (ref 0–0.1)
BASOPHILS NFR BLD AUTO: 0 % (ref 0–1)
BILIRUB SERPL-MCNC: 0.25 MG/DL (ref 0.2–1)
BILIRUB UR QL STRIP: NEGATIVE
BUN SERPL-MCNC: 7 MG/DL (ref 5–25)
CALCIUM ALBUM COR SERPL-MCNC: 9.6 MG/DL (ref 8.3–10.1)
CALCIUM SERPL-MCNC: 8.5 MG/DL (ref 8.3–10.1)
CHLORIDE SERPL-SCNC: 99 MMOL/L (ref 100–108)
CLARITY UR: CLEAR
CO2 SERPL-SCNC: 23 MMOL/L (ref 21–32)
COLOR UR: YELLOW
CREAT SERPL-MCNC: 0.4 MG/DL (ref 0.6–1.3)
EOSINOPHIL # BLD AUTO: 0.1 THOUSAND/ΜL (ref 0–0.61)
EOSINOPHIL NFR BLD AUTO: 1 % (ref 0–6)
ERYTHROCYTE [DISTWIDTH] IN BLOOD BY AUTOMATED COUNT: 13.2 % (ref 11.6–15.1)
GFR SERPL CREATININE-BSD FRML MDRD: 129 ML/MIN/1.73SQ M
GLUCOSE SERPL-MCNC: 79 MG/DL (ref 65–140)
GLUCOSE UR STRIP-MCNC: NEGATIVE MG/DL
HCT VFR BLD AUTO: 33.3 % (ref 34.8–46.1)
HGB BLD-MCNC: 11.4 G/DL (ref 11.5–15.4)
HGB UR QL STRIP.AUTO: ABNORMAL
IMM GRANULOCYTES # BLD AUTO: 0.2 THOUSAND/UL (ref 0–0.2)
IMM GRANULOCYTES NFR BLD AUTO: 2 % (ref 0–2)
KETONES UR STRIP-MCNC: NEGATIVE MG/DL
LEUKOCYTE ESTERASE UR QL STRIP: NEGATIVE
LYMPHOCYTES # BLD AUTO: 1.69 THOUSANDS/ΜL (ref 0.6–4.47)
LYMPHOCYTES NFR BLD AUTO: 13 % (ref 14–44)
MCH RBC QN AUTO: 32.1 PG (ref 26.8–34.3)
MCHC RBC AUTO-ENTMCNC: 34.2 G/DL (ref 31.4–37.4)
MCV RBC AUTO: 94 FL (ref 82–98)
MONOCYTES # BLD AUTO: 1.13 THOUSAND/ΜL (ref 0.17–1.22)
MONOCYTES NFR BLD AUTO: 9 % (ref 4–12)
NEUTROPHILS # BLD AUTO: 9.94 THOUSANDS/ΜL (ref 1.85–7.62)
NEUTS SEG NFR BLD AUTO: 75 % (ref 43–75)
NITRITE UR QL STRIP: NEGATIVE
NON-SQ EPI CELLS URNS QL MICRO: ABNORMAL /HPF
NRBC BLD AUTO-RTO: 0 /100 WBCS
P AXIS: 33 DEGREES
PH UR STRIP.AUTO: 8.5 [PH] (ref 4.5–8)
PLATELET # BLD AUTO: 241 THOUSANDS/UL (ref 149–390)
PMV BLD AUTO: 9.7 FL (ref 8.9–12.7)
POTASSIUM SERPL-SCNC: 3.7 MMOL/L (ref 3.5–5.3)
PR INTERVAL: 142 MS
PROT SERPL-MCNC: 6.2 G/DL (ref 6.4–8.2)
PROT UR STRIP-MCNC: NEGATIVE MG/DL
QRS AXIS: 52 DEGREES
QRSD INTERVAL: 82 MS
QT INTERVAL: 356 MS
QTC INTERVAL: 415 MS
RBC # BLD AUTO: 3.55 MILLION/UL (ref 3.81–5.12)
RBC #/AREA URNS AUTO: ABNORMAL /HPF
SODIUM SERPL-SCNC: 131 MMOL/L (ref 136–145)
SP GR UR STRIP.AUTO: 1.02 (ref 1–1.03)
T WAVE AXIS: 39 DEGREES
TROPONIN I SERPL-MCNC: <0.02 NG/ML
UROBILINOGEN UR QL STRIP.AUTO: 0.2 E.U./DL
VENTRICULAR RATE: 82 BPM
WBC # BLD AUTO: 13.11 THOUSAND/UL (ref 4.31–10.16)
WBC #/AREA URNS AUTO: ABNORMAL /HPF

## 2020-10-29 PROCEDURE — 99285 EMERGENCY DEPT VISIT HI MDM: CPT | Performed by: PHYSICIAN ASSISTANT

## 2020-10-29 PROCEDURE — 36415 COLL VENOUS BLD VENIPUNCTURE: CPT | Performed by: PHYSICIAN ASSISTANT

## 2020-10-29 PROCEDURE — 93005 ELECTROCARDIOGRAM TRACING: CPT

## 2020-10-29 PROCEDURE — 80053 COMPREHEN METABOLIC PANEL: CPT | Performed by: PHYSICIAN ASSISTANT

## 2020-10-29 PROCEDURE — 71046 X-RAY EXAM CHEST 2 VIEWS: CPT

## 2020-10-29 PROCEDURE — 87086 URINE CULTURE/COLONY COUNT: CPT

## 2020-10-29 PROCEDURE — 84484 ASSAY OF TROPONIN QUANT: CPT | Performed by: PHYSICIAN ASSISTANT

## 2020-10-29 PROCEDURE — 99285 EMERGENCY DEPT VISIT HI MDM: CPT

## 2020-10-29 PROCEDURE — 96360 HYDRATION IV INFUSION INIT: CPT

## 2020-10-29 PROCEDURE — 85025 COMPLETE CBC W/AUTO DIFF WBC: CPT | Performed by: PHYSICIAN ASSISTANT

## 2020-10-29 PROCEDURE — 81001 URINALYSIS AUTO W/SCOPE: CPT

## 2020-10-29 PROCEDURE — 93010 ELECTROCARDIOGRAM REPORT: CPT

## 2020-10-29 RX ORDER — HYDROXYZINE HYDROCHLORIDE 10 MG/1
10 TABLET, FILM COATED ORAL ONCE
Status: COMPLETED | OUTPATIENT
Start: 2020-10-29 | End: 2020-10-29

## 2020-10-29 RX ADMIN — HYDROXYZINE HYDROCHLORIDE 10 MG: 10 TABLET, FILM COATED ORAL at 10:17

## 2020-10-29 RX ADMIN — SODIUM CHLORIDE 1000 ML: 0.9 INJECTION, SOLUTION INTRAVENOUS at 11:11

## 2020-10-30 LAB — BACTERIA UR CULT: NORMAL

## 2020-11-02 ENCOUNTER — TELEPHONE (OUTPATIENT)
Dept: FAMILY MEDICINE CLINIC | Facility: CLINIC | Age: 40
End: 2020-11-02

## 2020-11-02 ENCOUNTER — ROUTINE PRENATAL (OUTPATIENT)
Dept: OBGYN CLINIC | Facility: CLINIC | Age: 40
End: 2020-11-02
Payer: COMMERCIAL

## 2020-11-02 VITALS
HEIGHT: 69 IN | BODY MASS INDEX: 28.17 KG/M2 | TEMPERATURE: 95.5 F | WEIGHT: 190.2 LBS | DIASTOLIC BLOOD PRESSURE: 78 MMHG | SYSTOLIC BLOOD PRESSURE: 136 MMHG

## 2020-11-02 DIAGNOSIS — Z3A.30 PREGNANCY WITH 30 COMPLETED WEEKS GESTATION: Primary | ICD-10-CM

## 2020-11-02 DIAGNOSIS — Z23 NEED FOR TDAP VACCINATION: ICD-10-CM

## 2020-11-02 PROCEDURE — 90715 TDAP VACCINE 7 YRS/> IM: CPT | Performed by: OBSTETRICS & GYNECOLOGY

## 2020-11-02 PROCEDURE — 99214 OFFICE O/P EST MOD 30 MIN: CPT | Performed by: OBSTETRICS & GYNECOLOGY

## 2020-11-02 PROCEDURE — 90471 IMMUNIZATION ADMIN: CPT | Performed by: OBSTETRICS & GYNECOLOGY

## 2020-11-04 ENCOUNTER — SOCIAL WORK (OUTPATIENT)
Dept: BEHAVIORAL/MENTAL HEALTH CLINIC | Facility: CLINIC | Age: 40
End: 2020-11-04
Payer: COMMERCIAL

## 2020-11-04 DIAGNOSIS — O99.343 ANXIETY IN PREGNANCY IN THIRD TRIMESTER, ANTEPARTUM: ICD-10-CM

## 2020-11-04 DIAGNOSIS — O99.343 DEPRESSION AFFECTING PREGNANCY IN THIRD TRIMESTER, ANTEPARTUM: Primary | ICD-10-CM

## 2020-11-04 DIAGNOSIS — F41.9 ANXIETY IN PREGNANCY IN THIRD TRIMESTER, ANTEPARTUM: ICD-10-CM

## 2020-11-04 DIAGNOSIS — F32.A DEPRESSION AFFECTING PREGNANCY IN THIRD TRIMESTER, ANTEPARTUM: Primary | ICD-10-CM

## 2020-11-04 PROCEDURE — 90834 PSYTX W PT 45 MINUTES: CPT | Performed by: SOCIAL WORKER

## 2020-11-06 ENCOUNTER — TELEPHONE (OUTPATIENT)
Dept: FAMILY MEDICINE CLINIC | Facility: CLINIC | Age: 40
End: 2020-11-06

## 2020-11-11 ENCOUNTER — LAB (OUTPATIENT)
Dept: LAB | Facility: HOSPITAL | Age: 40
End: 2020-11-11
Payer: COMMERCIAL

## 2020-11-11 ENCOUNTER — TELEPHONE (OUTPATIENT)
Dept: OBGYN CLINIC | Facility: CLINIC | Age: 40
End: 2020-11-11

## 2020-11-11 DIAGNOSIS — Z3A.31 31 WEEKS GESTATION OF PREGNANCY: ICD-10-CM

## 2020-11-11 DIAGNOSIS — O99.810 ABNORMAL GLUCOSE AFFECTING PREGNANCY: ICD-10-CM

## 2020-11-11 DIAGNOSIS — Z3A.31 31 WEEKS GESTATION OF PREGNANCY: Primary | ICD-10-CM

## 2020-11-11 LAB
GLUCOSE 1H P 100 G GLC PO SERPL-MCNC: 145 MG/DL (ref 65–179)
GLUCOSE 2H P 100 G GLC PO SERPL-MCNC: 130 MG/DL (ref 65–154)
GLUCOSE 3H P 100 G GLC PO SERPL-MCNC: 53 MG/DL (ref 65–139)
GLUCOSE P FAST SERPL-MCNC: 82 MG/DL (ref 65–99)
T4 FREE SERPL-MCNC: 0.85 NG/DL (ref 0.76–1.46)
TSH SERPL DL<=0.05 MIU/L-ACNC: 0.72 UIU/ML (ref 0.36–3.74)

## 2020-11-11 PROCEDURE — 82951 GLUCOSE TOLERANCE TEST (GTT): CPT

## 2020-11-11 PROCEDURE — 82952 GTT-ADDED SAMPLES: CPT

## 2020-11-11 PROCEDURE — 36415 COLL VENOUS BLD VENIPUNCTURE: CPT

## 2020-11-11 PROCEDURE — 84443 ASSAY THYROID STIM HORMONE: CPT

## 2020-11-11 PROCEDURE — 84439 ASSAY OF FREE THYROXINE: CPT

## 2020-11-13 ENCOUNTER — TELEPHONE (OUTPATIENT)
Dept: PERINATAL CARE | Facility: CLINIC | Age: 40
End: 2020-11-13

## 2020-11-16 ENCOUNTER — ULTRASOUND (OUTPATIENT)
Dept: PERINATAL CARE | Facility: OTHER | Age: 40
End: 2020-11-16
Payer: COMMERCIAL

## 2020-11-16 VITALS
SYSTOLIC BLOOD PRESSURE: 112 MMHG | HEART RATE: 110 BPM | BODY MASS INDEX: 28.32 KG/M2 | DIASTOLIC BLOOD PRESSURE: 70 MMHG | HEIGHT: 69 IN | WEIGHT: 191.2 LBS | TEMPERATURE: 97.8 F

## 2020-11-16 DIAGNOSIS — F11.20 OPIOID TYPE DEPENDENCE, EPISODIC (HCC): ICD-10-CM

## 2020-11-16 DIAGNOSIS — O34.219 MATERNAL CARE FOR SCAR FROM PREVIOUS CESAREAN DELIVERY, UNSPECIFIED SCAR TYPE: Primary | ICD-10-CM

## 2020-11-16 DIAGNOSIS — O35.2XX0 PREVIOUS CHILD WITH CONGENITAL ANOMALY, CURRENTLY PREGNANT, ANTEPARTUM, SINGLE OR UNSPECIFIED FETUS: ICD-10-CM

## 2020-11-16 DIAGNOSIS — O09.523 MULTIGRAVIDA OF ADVANCED MATERNAL AGE IN THIRD TRIMESTER: ICD-10-CM

## 2020-11-16 DIAGNOSIS — Z3A.32 32 WEEKS GESTATION OF PREGNANCY: ICD-10-CM

## 2020-11-16 PROCEDURE — 99213 OFFICE O/P EST LOW 20 MIN: CPT | Performed by: OBSTETRICS & GYNECOLOGY

## 2020-11-16 PROCEDURE — 76816 OB US FOLLOW-UP PER FETUS: CPT | Performed by: OBSTETRICS & GYNECOLOGY

## 2020-11-17 ENCOUNTER — TELEPHONE (OUTPATIENT)
Dept: OBGYN CLINIC | Facility: CLINIC | Age: 40
End: 2020-11-17

## 2020-11-19 ENCOUNTER — TELEMEDICINE (OUTPATIENT)
Dept: PERINATAL CARE | Facility: OTHER | Age: 40
End: 2020-11-19
Payer: COMMERCIAL

## 2020-11-19 ENCOUNTER — TELEPHONE (OUTPATIENT)
Dept: PERINATAL CARE | Facility: CLINIC | Age: 40
End: 2020-11-19

## 2020-11-19 DIAGNOSIS — Z3A.32 32 WEEKS GESTATION OF PREGNANCY: ICD-10-CM

## 2020-11-19 DIAGNOSIS — F11.20 SUBOXONE MAINTENANCE TREATMENT COMPLICATING PREGNANCY, ANTEPARTUM, THIRD TRIMESTER (HCC): ICD-10-CM

## 2020-11-19 DIAGNOSIS — O99.323 SUBOXONE MAINTENANCE TREATMENT COMPLICATING PREGNANCY, ANTEPARTUM, THIRD TRIMESTER (HCC): ICD-10-CM

## 2020-11-19 DIAGNOSIS — F11.11 HISTORY OF HEROIN ABUSE (HCC): Primary | ICD-10-CM

## 2020-11-19 PROCEDURE — 99214 OFFICE O/P EST MOD 30 MIN: CPT | Performed by: OBSTETRICS & GYNECOLOGY

## 2020-11-19 RX ORDER — BUPRENORPHINE AND NALOXONE 2; .5 MG/1; MG/1
1 FILM, SOLUBLE BUCCAL; SUBLINGUAL DAILY
Qty: 7 FILM | Refills: 0 | Status: SHIPPED | OUTPATIENT
Start: 2020-11-20 | End: 2020-11-20 | Stop reason: SDUPTHER

## 2020-11-19 RX ORDER — NALOXONE HYDROCHLORIDE 4 MG/.1ML
SPRAY NASAL
Qty: 1 EACH | Refills: 1 | Status: SHIPPED | OUTPATIENT
Start: 2020-11-19 | End: 2021-05-13

## 2020-11-19 RX ORDER — BUPRENORPHINE HYDROCHLORIDE AND NALOXONE HYDROCHLORIDE DIHYDRATE 2; .5 MG/1; MG/1
1 TABLET SUBLINGUAL DAILY
Status: CANCELLED | OUTPATIENT
Start: 2020-11-19

## 2020-11-20 ENCOUNTER — ROUTINE PRENATAL (OUTPATIENT)
Dept: OBGYN CLINIC | Facility: CLINIC | Age: 40
End: 2020-11-20
Payer: COMMERCIAL

## 2020-11-20 ENCOUNTER — TELEPHONE (OUTPATIENT)
Dept: PERINATAL CARE | Facility: CLINIC | Age: 40
End: 2020-11-20

## 2020-11-20 VITALS
SYSTOLIC BLOOD PRESSURE: 130 MMHG | DIASTOLIC BLOOD PRESSURE: 72 MMHG | BODY MASS INDEX: 28.88 KG/M2 | HEIGHT: 69 IN | WEIGHT: 195 LBS

## 2020-11-20 DIAGNOSIS — Z34.90 ENCOUNTER FOR SUPERVISION OF NORMAL PREGNANCY, ANTEPARTUM, UNSPECIFIED GRAVIDITY: Primary | ICD-10-CM

## 2020-11-20 LAB
AMPHETAMINES SERPL QL SCN: NEGATIVE
BARBITURATES UR QL: NEGATIVE
BENZODIAZ UR QL: NEGATIVE
COCAINE UR QL: NEGATIVE
METHADONE UR QL: NEGATIVE
OPIATES UR QL SCN: NEGATIVE
OXYCODONE+OXYMORPHONE UR QL SCN: NEGATIVE
PCP UR QL: NEGATIVE
THC UR QL: NEGATIVE

## 2020-11-20 PROCEDURE — 80307 DRUG TEST PRSMV CHEM ANLYZR: CPT | Performed by: PHYSICIAN ASSISTANT

## 2020-11-20 PROCEDURE — 99214 OFFICE O/P EST MOD 30 MIN: CPT | Performed by: PHYSICIAN ASSISTANT

## 2020-11-20 PROCEDURE — 3008F BODY MASS INDEX DOCD: CPT | Performed by: PHYSICIAN ASSISTANT

## 2020-11-20 RX ORDER — BUPRENORPHINE AND NALOXONE 2; .5 MG/1; MG/1
1 FILM, SOLUBLE BUCCAL; SUBLINGUAL DAILY
Qty: 7 FILM | Refills: 0 | Status: SHIPPED | OUTPATIENT
Start: 2020-11-20 | End: 2020-11-27

## 2020-11-24 ENCOUNTER — TELEPHONE (OUTPATIENT)
Dept: OTHER | Facility: HOSPITAL | Age: 40
End: 2020-11-24

## 2020-11-30 ENCOUNTER — TELEPHONE (OUTPATIENT)
Dept: PERINATAL CARE | Facility: OTHER | Age: 40
End: 2020-11-30

## 2020-11-30 DIAGNOSIS — Z3A.34 34 WEEKS GESTATION OF PREGNANCY: ICD-10-CM

## 2020-11-30 DIAGNOSIS — F11.20 SUBOXONE MAINTENANCE TREATMENT COMPLICATING PREGNANCY, ANTEPARTUM, THIRD TRIMESTER (HCC): Primary | ICD-10-CM

## 2020-11-30 DIAGNOSIS — O99.323 SUBOXONE MAINTENANCE TREATMENT COMPLICATING PREGNANCY, ANTEPARTUM, THIRD TRIMESTER (HCC): Primary | ICD-10-CM

## 2020-11-30 RX ORDER — BUPRENORPHINE AND NALOXONE 2; .5 MG/1; MG/1
1 FILM, SOLUBLE BUCCAL; SUBLINGUAL DAILY
Qty: 14 FILM | Refills: 0 | Status: SHIPPED | OUTPATIENT
Start: 2020-11-30 | End: 2020-12-14

## 2020-12-01 PROBLEM — Z3A.34 34 WEEKS GESTATION OF PREGNANCY: Status: ACTIVE | Noted: 2020-09-02

## 2020-12-03 ENCOUNTER — ROUTINE PRENATAL (OUTPATIENT)
Dept: OBGYN CLINIC | Facility: CLINIC | Age: 40
End: 2020-12-03
Payer: COMMERCIAL

## 2020-12-03 VITALS — SYSTOLIC BLOOD PRESSURE: 128 MMHG | DIASTOLIC BLOOD PRESSURE: 80 MMHG | BODY MASS INDEX: 29.24 KG/M2 | WEIGHT: 198 LBS

## 2020-12-03 DIAGNOSIS — Z34.90 PREGNANCY, UNSPECIFIED GESTATIONAL AGE: Primary | ICD-10-CM

## 2020-12-03 PROCEDURE — 99213 OFFICE O/P EST LOW 20 MIN: CPT | Performed by: PHYSICIAN ASSISTANT

## 2020-12-11 ENCOUNTER — TELEPHONE (OUTPATIENT)
Dept: PERINATAL CARE | Facility: CLINIC | Age: 40
End: 2020-12-11

## 2020-12-13 PROBLEM — O99.323 SUBOXONE MAINTENANCE TREATMENT COMPLICATING PREGNANCY, ANTEPARTUM, THIRD TRIMESTER (HCC): Status: ACTIVE | Noted: 2020-12-13

## 2020-12-13 PROBLEM — Z3A.36 36 WEEKS GESTATION OF PREGNANCY: Status: ACTIVE | Noted: 2020-09-02

## 2020-12-13 PROBLEM — F11.20 SUBOXONE MAINTENANCE TREATMENT COMPLICATING PREGNANCY, ANTEPARTUM, THIRD TRIMESTER (HCC): Status: ACTIVE | Noted: 2020-12-13

## 2020-12-14 ENCOUNTER — ULTRASOUND (OUTPATIENT)
Dept: PERINATAL CARE | Facility: OTHER | Age: 40
End: 2020-12-14
Payer: COMMERCIAL

## 2020-12-14 ENCOUNTER — ROUTINE PRENATAL (OUTPATIENT)
Dept: PERINATAL CARE | Facility: OTHER | Age: 40
End: 2020-12-14
Payer: COMMERCIAL

## 2020-12-14 ENCOUNTER — TELEMEDICINE (OUTPATIENT)
Dept: BEHAVIORAL/MENTAL HEALTH CLINIC | Facility: CLINIC | Age: 40
End: 2020-12-14
Payer: COMMERCIAL

## 2020-12-14 VITALS
DIASTOLIC BLOOD PRESSURE: 74 MMHG | WEIGHT: 194.2 LBS | SYSTOLIC BLOOD PRESSURE: 126 MMHG | HEIGHT: 69 IN | BODY MASS INDEX: 28.76 KG/M2 | HEART RATE: 96 BPM

## 2020-12-14 DIAGNOSIS — F11.20 SUBOXONE MAINTENANCE TREATMENT COMPLICATING PREGNANCY, ANTEPARTUM, THIRD TRIMESTER (HCC): Primary | ICD-10-CM

## 2020-12-14 DIAGNOSIS — O09.523 MULTIGRAVIDA OF ADVANCED MATERNAL AGE IN THIRD TRIMESTER: Primary | ICD-10-CM

## 2020-12-14 DIAGNOSIS — F32.A DEPRESSION AFFECTING PREGNANCY IN THIRD TRIMESTER, ANTEPARTUM: ICD-10-CM

## 2020-12-14 DIAGNOSIS — O99.323 SUBOXONE MAINTENANCE TREATMENT COMPLICATING PREGNANCY, ANTEPARTUM, THIRD TRIMESTER (HCC): ICD-10-CM

## 2020-12-14 DIAGNOSIS — O09.523 MULTIGRAVIDA OF ADVANCED MATERNAL AGE IN THIRD TRIMESTER: ICD-10-CM

## 2020-12-14 DIAGNOSIS — F11.20 SUBOXONE MAINTENANCE TREATMENT COMPLICATING PREGNANCY, ANTEPARTUM, THIRD TRIMESTER (HCC): ICD-10-CM

## 2020-12-14 DIAGNOSIS — O34.211 MATERNAL CARE DUE TO LOW TRANSVERSE UTERINE SCAR FROM PREVIOUS CESAREAN DELIVERY: ICD-10-CM

## 2020-12-14 DIAGNOSIS — O99.343 ANXIETY IN PREGNANCY IN THIRD TRIMESTER, ANTEPARTUM: Primary | ICD-10-CM

## 2020-12-14 DIAGNOSIS — F41.9 ANXIETY IN PREGNANCY IN THIRD TRIMESTER, ANTEPARTUM: Primary | ICD-10-CM

## 2020-12-14 DIAGNOSIS — Z3A.36 36 WEEKS GESTATION OF PREGNANCY: ICD-10-CM

## 2020-12-14 DIAGNOSIS — O99.343 DEPRESSION AFFECTING PREGNANCY IN THIRD TRIMESTER, ANTEPARTUM: ICD-10-CM

## 2020-12-14 DIAGNOSIS — O99.323 SUBOXONE MAINTENANCE TREATMENT COMPLICATING PREGNANCY, ANTEPARTUM, THIRD TRIMESTER (HCC): Primary | ICD-10-CM

## 2020-12-14 PROCEDURE — 59025 FETAL NON-STRESS TEST: CPT | Performed by: OBSTETRICS & GYNECOLOGY

## 2020-12-14 PROCEDURE — 3008F BODY MASS INDEX DOCD: CPT | Performed by: OBSTETRICS & GYNECOLOGY

## 2020-12-14 PROCEDURE — 99213 OFFICE O/P EST LOW 20 MIN: CPT | Performed by: OBSTETRICS & GYNECOLOGY

## 2020-12-14 PROCEDURE — NC001 PR NO CHARGE

## 2020-12-14 PROCEDURE — 90834 PSYTX W PT 45 MINUTES: CPT | Performed by: SOCIAL WORKER

## 2020-12-14 PROCEDURE — 76816 OB US FOLLOW-UP PER FETUS: CPT | Performed by: OBSTETRICS & GYNECOLOGY

## 2020-12-18 ENCOUNTER — TELEPHONE (OUTPATIENT)
Dept: PERINATAL CARE | Facility: OTHER | Age: 40
End: 2020-12-18

## 2020-12-21 ENCOUNTER — ULTRASOUND (OUTPATIENT)
Dept: PERINATAL CARE | Facility: OTHER | Age: 40
End: 2020-12-21
Payer: COMMERCIAL

## 2020-12-21 VITALS
HEART RATE: 88 BPM | BODY MASS INDEX: 28.82 KG/M2 | WEIGHT: 194.6 LBS | DIASTOLIC BLOOD PRESSURE: 72 MMHG | SYSTOLIC BLOOD PRESSURE: 120 MMHG | HEIGHT: 69 IN

## 2020-12-21 DIAGNOSIS — O09.523 MULTIGRAVIDA OF ADVANCED MATERNAL AGE IN THIRD TRIMESTER: Primary | ICD-10-CM

## 2020-12-21 PROCEDURE — 76815 OB US LIMITED FETUS(S): CPT | Performed by: OBSTETRICS & GYNECOLOGY

## 2020-12-21 PROCEDURE — 59025 FETAL NON-STRESS TEST: CPT | Performed by: OBSTETRICS & GYNECOLOGY

## 2020-12-24 ENCOUNTER — TELEPHONE (OUTPATIENT)
Dept: OBGYN CLINIC | Facility: CLINIC | Age: 40
End: 2020-12-24

## 2020-12-24 ENCOUNTER — ROUTINE PRENATAL (OUTPATIENT)
Dept: OBGYN CLINIC | Facility: CLINIC | Age: 40
End: 2020-12-24
Payer: COMMERCIAL

## 2020-12-24 ENCOUNTER — TELEPHONE (OUTPATIENT)
Dept: PERINATAL CARE | Facility: OTHER | Age: 40
End: 2020-12-24

## 2020-12-24 ENCOUNTER — TELEPHONE (OUTPATIENT)
Dept: PERINATAL CARE | Facility: CLINIC | Age: 40
End: 2020-12-24

## 2020-12-24 VITALS — DIASTOLIC BLOOD PRESSURE: 80 MMHG | SYSTOLIC BLOOD PRESSURE: 120 MMHG | BODY MASS INDEX: 28.8 KG/M2 | WEIGHT: 195 LBS

## 2020-12-24 DIAGNOSIS — O99.323 SUBOXONE MAINTENANCE TREATMENT COMPLICATING PREGNANCY, ANTEPARTUM, THIRD TRIMESTER (HCC): ICD-10-CM

## 2020-12-24 DIAGNOSIS — Z34.83 PRENATAL CARE, SUBSEQUENT PREGNANCY, THIRD TRIMESTER: ICD-10-CM

## 2020-12-24 DIAGNOSIS — O99.323 SUBOXONE MAINTENANCE TREATMENT COMPLICATING PREGNANCY, ANTEPARTUM, THIRD TRIMESTER (HCC): Primary | ICD-10-CM

## 2020-12-24 DIAGNOSIS — Z3A.37 37 WEEKS GESTATION OF PREGNANCY: ICD-10-CM

## 2020-12-24 DIAGNOSIS — F11.20 SUBOXONE MAINTENANCE TREATMENT COMPLICATING PREGNANCY, ANTEPARTUM, THIRD TRIMESTER (HCC): Primary | ICD-10-CM

## 2020-12-24 DIAGNOSIS — Z34.90 PREGNANCY, UNSPECIFIED GESTATIONAL AGE: Primary | ICD-10-CM

## 2020-12-24 DIAGNOSIS — F11.20 SUBOXONE MAINTENANCE TREATMENT COMPLICATING PREGNANCY, ANTEPARTUM, THIRD TRIMESTER (HCC): ICD-10-CM

## 2020-12-24 PROCEDURE — NC001 PR NO CHARGE: Performed by: OBSTETRICS & GYNECOLOGY

## 2020-12-24 PROCEDURE — 87081 CULTURE SCREEN ONLY: CPT | Performed by: PHYSICIAN ASSISTANT

## 2020-12-24 PROCEDURE — 99215 OFFICE O/P EST HI 40 MIN: CPT | Performed by: PHYSICIAN ASSISTANT

## 2020-12-24 RX ORDER — BUPRENORPHINE AND NALOXONE 2; .5 MG/1; MG/1
FILM, SOLUBLE BUCCAL; SUBLINGUAL
Qty: 6 FILM | Refills: 0 | Status: SHIPPED | OUTPATIENT
Start: 2020-12-24 | End: 2021-01-15

## 2020-12-24 NOTE — TELEPHONE ENCOUNTER
Pt asking because she wants to bring her best friend - Maricruz  Reviewed with pt we recommend having one support partner the whole stay on L&D  But things could change from now until delivery

## 2020-12-24 NOTE — TELEPHONE ENCOUNTER
Pt called and said she would like to know about having someone with her during delivery, if the delivery is a day or so if the person can come back and forth to support her or does the person need to be there the whole time  Please call pt back

## 2020-12-27 LAB
EXTERNAL GROUP B STREP ANTIGEN: NEGATIVE
GP B STREP GENITAL QL CULT: NORMAL

## 2020-12-28 ENCOUNTER — ULTRASOUND (OUTPATIENT)
Dept: PERINATAL CARE | Facility: OTHER | Age: 40
End: 2020-12-28
Payer: COMMERCIAL

## 2020-12-28 ENCOUNTER — HOSPITAL ENCOUNTER (INPATIENT)
Facility: HOSPITAL | Age: 40
LOS: 3 days | Discharge: HOME | End: 2020-12-31
Attending: OBSTETRICS & GYNECOLOGY | Admitting: OBSTETRICS & GYNECOLOGY
Payer: COMMERCIAL

## 2020-12-28 ENCOUNTER — ROUTINE PRENATAL (OUTPATIENT)
Dept: PERINATAL CARE | Facility: OTHER | Age: 40
End: 2020-12-28
Payer: COMMERCIAL

## 2020-12-28 ENCOUNTER — HOSPITAL ENCOUNTER (EMERGENCY)
Facility: HOSPITAL | Age: 40
Discharge: STILL ADMITTED OR ADMINISTRATIVE | End: 2020-12-28
Payer: COMMERCIAL

## 2020-12-28 VITALS
BODY MASS INDEX: 29.03 KG/M2 | WEIGHT: 196 LBS | DIASTOLIC BLOOD PRESSURE: 78 MMHG | HEART RATE: 100 BPM | SYSTOLIC BLOOD PRESSURE: 122 MMHG | HEIGHT: 69 IN

## 2020-12-28 VITALS
DIASTOLIC BLOOD PRESSURE: 83 MMHG | SYSTOLIC BLOOD PRESSURE: 152 MMHG | OXYGEN SATURATION: 100 % | HEART RATE: 100 BPM | TEMPERATURE: 97.9 F

## 2020-12-28 DIAGNOSIS — O99.323 SUBOXONE MAINTENANCE TREATMENT COMPLICATING PREGNANCY, ANTEPARTUM, THIRD TRIMESTER (HCC): Primary | ICD-10-CM

## 2020-12-28 DIAGNOSIS — Z3A.38 38 WEEKS GESTATION OF PREGNANCY: Primary | ICD-10-CM

## 2020-12-28 DIAGNOSIS — Z36.89 ENCOUNTER FOR ULTRASOUND TO CHECK FETAL GROWTH: ICD-10-CM

## 2020-12-28 DIAGNOSIS — O34.219 VAGINAL BIRTH AFTER CESAREAN: ICD-10-CM

## 2020-12-28 DIAGNOSIS — F11.20 SUBOXONE MAINTENANCE TREATMENT COMPLICATING PREGNANCY, ANTEPARTUM, THIRD TRIMESTER (HCC): Primary | ICD-10-CM

## 2020-12-28 DIAGNOSIS — O09.523 MULTIGRAVIDA OF ADVANCED MATERNAL AGE IN THIRD TRIMESTER: Primary | ICD-10-CM

## 2020-12-28 LAB
ABO GROUP BLD: NORMAL
AMPHETAMINES SERPL QL SCN: NEGATIVE
BARBITURATES UR QL: NEGATIVE
BASOPHILS # BLD AUTO: 0.04 THOUSANDS/ΜL (ref 0–0.1)
BASOPHILS NFR BLD AUTO: 0 % (ref 0–1)
BENZODIAZ UR QL: NEGATIVE
BLD GP AB SCN SERPL QL: NEGATIVE
COCAINE UR QL: NEGATIVE
EOSINOPHIL # BLD AUTO: 0.09 THOUSAND/ΜL (ref 0–0.61)
EOSINOPHIL NFR BLD AUTO: 1 % (ref 0–6)
ERYTHROCYTE [DISTWIDTH] IN BLOOD BY AUTOMATED COUNT: 13.2 % (ref 11.6–15.1)
HCT VFR BLD AUTO: 36.3 % (ref 34.8–46.1)
HGB BLD-MCNC: 11.8 G/DL (ref 11.5–15.4)
HOLD SPECIMEN: YES
IMM GRANULOCYTES # BLD AUTO: 0.17 THOUSAND/UL (ref 0–0.2)
IMM GRANULOCYTES NFR BLD AUTO: 1 % (ref 0–2)
LYMPHOCYTES # BLD AUTO: 2.18 THOUSANDS/ΜL (ref 0.6–4.47)
LYMPHOCYTES NFR BLD AUTO: 18 % (ref 14–44)
MCH RBC QN AUTO: 30.2 PG (ref 26.8–34.3)
MCHC RBC AUTO-ENTMCNC: 32.5 G/DL (ref 31.4–37.4)
MCV RBC AUTO: 93 FL (ref 82–98)
METHADONE UR QL: NEGATIVE
MONOCYTES # BLD AUTO: 1.23 THOUSAND/ΜL (ref 0.17–1.22)
MONOCYTES NFR BLD AUTO: 10 % (ref 4–12)
NEUTROPHILS # BLD AUTO: 8.45 THOUSANDS/ΜL (ref 1.85–7.62)
NEUTS SEG NFR BLD AUTO: 70 % (ref 43–75)
NRBC BLD AUTO-RTO: 0 /100 WBCS
OPIATES UR QL SCN: NEGATIVE
OXYCODONE+OXYMORPHONE UR QL SCN: NEGATIVE
PCP UR QL: NEGATIVE
PLATELET # BLD AUTO: 313 THOUSANDS/UL (ref 149–390)
PMV BLD AUTO: 9.8 FL (ref 8.9–12.7)
RBC # BLD AUTO: 3.91 MILLION/UL (ref 3.81–5.12)
RH BLD: POSITIVE
SPECIMEN EXPIRATION DATE: NORMAL
THC UR QL: NEGATIVE
WBC # BLD AUTO: 12.16 THOUSAND/UL (ref 4.31–10.16)

## 2020-12-28 PROCEDURE — 80307 DRUG TEST PRSMV CHEM ANLYZR: CPT | Performed by: OBSTETRICS & GYNECOLOGY

## 2020-12-28 PROCEDURE — 86900 BLOOD TYPING SEROLOGIC ABO: CPT | Performed by: OBSTETRICS & GYNECOLOGY

## 2020-12-28 PROCEDURE — 85025 COMPLETE CBC W/AUTO DIFF WBC: CPT | Performed by: OBSTETRICS & GYNECOLOGY

## 2020-12-28 PROCEDURE — NC001 PR NO CHARGE

## 2020-12-28 PROCEDURE — 86901 BLOOD TYPING SEROLOGIC RH(D): CPT | Performed by: OBSTETRICS & GYNECOLOGY

## 2020-12-28 PROCEDURE — 3008F BODY MASS INDEX DOCD: CPT | Performed by: OBSTETRICS & GYNECOLOGY

## 2020-12-28 PROCEDURE — 99212 OFFICE O/P EST SF 10 MIN: CPT | Performed by: OBSTETRICS & GYNECOLOGY

## 2020-12-28 PROCEDURE — 4A1HXCZ MONITORING OF PRODUCTS OF CONCEPTION, CARDIAC RATE, EXTERNAL APPROACH: ICD-10-PCS | Performed by: OBSTETRICS & GYNECOLOGY

## 2020-12-28 PROCEDURE — NC001 PR NO CHARGE: Performed by: OBSTETRICS & GYNECOLOGY

## 2020-12-28 PROCEDURE — 76818 FETAL BIOPHYS PROFILE W/NST: CPT | Performed by: OBSTETRICS & GYNECOLOGY

## 2020-12-28 PROCEDURE — 86592 SYPHILIS TEST NON-TREP QUAL: CPT | Performed by: OBSTETRICS & GYNECOLOGY

## 2020-12-28 PROCEDURE — 76816 OB US FOLLOW-UP PER FETUS: CPT | Performed by: OBSTETRICS & GYNECOLOGY

## 2020-12-28 PROCEDURE — 86850 RBC ANTIBODY SCREEN: CPT | Performed by: OBSTETRICS & GYNECOLOGY

## 2020-12-28 RX ORDER — SODIUM CHLORIDE, SODIUM LACTATE, POTASSIUM CHLORIDE, CALCIUM CHLORIDE 600; 310; 30; 20 MG/100ML; MG/100ML; MG/100ML; MG/100ML
125 INJECTION, SOLUTION INTRAVENOUS CONTINUOUS
Status: DISCONTINUED | OUTPATIENT
Start: 2020-12-28 | End: 2020-12-31 | Stop reason: HOSPADM

## 2020-12-28 RX ORDER — OXYTOCIN/RINGER'S LACTATE 30/500 ML
1-30 PLASTIC BAG, INJECTION (ML) INTRAVENOUS
Status: DISCONTINUED | OUTPATIENT
Start: 2020-12-28 | End: 2020-12-31 | Stop reason: HOSPADM

## 2020-12-28 RX ORDER — BUPRENORPHINE AND NALOXONE 2; .5 MG/1; MG/1
1 FILM, SOLUBLE BUCCAL; SUBLINGUAL DAILY
Status: DISCONTINUED | OUTPATIENT
Start: 2020-12-29 | End: 2020-12-31 | Stop reason: HOSPADM

## 2020-12-28 RX ADMIN — SODIUM CHLORIDE, SODIUM LACTATE, POTASSIUM CHLORIDE, AND CALCIUM CHLORIDE 125 ML/HR: .6; .31; .03; .02 INJECTION, SOLUTION INTRAVENOUS at 17:30

## 2020-12-28 RX ADMIN — Medication 2 MILLI-UNITS/MIN: at 23:19

## 2020-12-28 NOTE — PLAN OF CARE
Problem: PAIN - ADULT  Goal: Verbalizes/displays adequate comfort level or baseline comfort level  Description: Interventions:  - Encourage patient to monitor pain and request assistance  - Assess pain using appropriate pain scale  - Administer analgesics based on type and severity of pain and evaluate response  - Implement non-pharmacological measures as appropriate and evaluate response  - Consider cultural and social influences on pain and pain management  - Notify physician/advanced practitioner if interventions unsuccessful or patient reports new pain  Outcome: Progressing     Problem: INFECTION - ADULT  Goal: Absence or prevention of progression during hospitalization  Description: INTERVENTIONS:  - Assess and monitor for signs and symptoms of infection  - Monitor lab/diagnostic results  - Monitor all insertion sites, i e  indwelling lines, tubes, and drains  - Monitor endotracheal if appropriate and nasal secretions for changes in amount and color  - [place] appropriate cooling/warming therapies per order  - Administer medications as ordered  - Instruct and encourage patient and family to use good hand hygiene technique  - Identify and instruct in appropriate isolation precautions for identified infection/condition  Outcome: Progressing  Goal: Absence of fever/infection during neutropenic period  Description: INTERVENTIONS:  - Monitor WBC    Outcome: Progressing     Problem: SAFETY ADULT  Goal: Patient will remain free of falls  Description: INTERVENTIONS:  - Assess patient frequently for physical needs  -  Identify cognitive and physical deficits and behaviors that affect risk of falls    -  [place] fall precautions as indicated by assessment   - Educate patient/family on patient safety including physical limitations  - Instruct patient to call for assistance with activity based on assessment  - Modify environment to reduce risk of injury  - Consider OT/PT consult to assist with strengthening/mobility  Outcome: Progressing  Goal: Maintain or return to baseline ADL function  Description: INTERVENTIONS:  -  Assess patient's ability to carry out ADLs; assess patient's baseline for ADL function and identify physical deficits which impact ability to perform ADLs (bathing, care of mouth/teeth, toileting, grooming, dressing, etc )  - Assess/evaluate cause of self-care deficits   - Assess range of motion  - Assess patient's mobility; develop plan if impaired  - Assess patient's need for assistive devices and provide as appropriate  - Encourage maximum independence but intervene and supervise when necessary  - Involve family in performance of ADLs  - Assess for home care needs following discharge   - Consider OT consult to assist with ADL evaluation and planning for discharge  - Provide patient education as appropriate  Outcome: Progressing  Goal: Maintain or return mobility status to optimal level  Description: INTERVENTIONS:  - Assess patient's baseline mobility status (ambulation, transfers, stairs, etc )    - Identify cognitive and physical deficits and behaviors that affect mobility  - Identify mobility aids required to assist with transfers and/or ambulation (gait belt, sit-to-stand, lift, walker, cane, etc )  - Walker fall precautions as indicated by assessment  - Record patient progress and toleration of activity level on Mobility SBAR; progress patient to next Phase/Stage  - Instruct patient to call for assistance with activity based on assessment  - Consider rehabilitation consult to assist with strengthening/weightbearing, etc   Outcome: Progressing     Problem: Knowledge Deficit  Goal: Patient/family/caregiver demonstrates understanding of disease process, treatment plan, medications, and discharge instructions  Description: Complete learning assessment and assess knowledge base    Interventions:  - Provide teaching at level of understanding  - Provide teaching via preferred learning methods  Outcome: Progressing  Goal: Verbalizes understanding of labor plan  Description: Assess patient/family/caregiver's baseline knowledge level and ability to understand information  Provide education via patient/family/caregiver's preferred learning method at appropriate level of understanding  1  Provide teaching at level of understanding  2  Provide teaching via preferred learning method(s)  Outcome: Progressing     Problem: DISCHARGE PLANNING  Goal: Discharge to home or other facility with appropriate resources  Description: INTERVENTIONS:  - Identify barriers to discharge w/patient and caregiver  - Arrange for needed discharge resources and transportation as appropriate  - Identify discharge learning needs (meds, wound care, etc )  - Arrange for interpretive services to assist at discharge as needed  - Refer to Case Management Department for coordinating discharge planning if the patient needs post-hospital services based on physician/advanced practitioner order or complex needs related to functional status, cognitive ability, or social support system  Outcome: Progressing     Problem: Labor & Delivery  Goal: Manages discomfort  Description: Assess and monitor for signs and symptoms of discomfort  Assess patient's pain level regularly and per hospital policy  Administer medications as ordered  Support use of nonpharmacological methods to help control pain such as distraction, imagery, relaxation, and application of heat and cold  Collaborate with interdisciplinary team and patient to determine appropriate pain management plan  1  Include patient in decisions related to comfort  2  Offer non-pharmacological pain management interventions  3  Report ineffective pain management to physician  Outcome: Progressing  Goal: Patient vital signs are stable  Description: 1  Assess vital signs - vaginal delivery    Outcome: Progressing

## 2020-12-28 NOTE — H&P
H & P- Obstetrics   Mirtha Patel 41 y o  female MRN: [id number]  Unit/Bed#: -01 Encounter: [id number]      Assessment/Plan:   41 y o  [id number] at 38w1d admitted for an induction of labor for NRNST and 2/8 BPP, TOLAC   EFW: 7 lbs 4 oz  VTX by transabdominal ultrasound   SVE: Cervical Dilation: 1  Cervical Effacement: 60  Cervical Consistency: Medium  Fetal Station: -2  Presentation: Vertex  Method: Manual  OB Examiner: Stevenkert  Admit  Follow up CBC, RPR, Blood Type  Induction method: prescott/pit  GBS negative status   Analgesia and/or epidural at patient request  Anticipate     Discussed with Dr Fouzia Lee      This patient will be an INPATIENT  and I certify the anticipated length of stay is >2 Midnights      SUBJECTIVE:    Chief Complaint: sent over from  center for non reassuring NST  & BPP 2/8    HPI: Mirtha Patel is a 41 y o  [id number] with an YAN of 1/10/2021, by Ultrasound at 38w1d who is being admitted for an induction of labor secondary to a 8 biophysical profile at the  center and nonreassuring NST  She denies having uterine contractions, has no LOF and scant LOF, and reports no VB  She states she has felt good CHANEL Hinkle     Pregnancy complications:     Patient Active Problem List   Diagnosis    Depression    Hemorrhoid    History of heroin abuse ([facility] )    Renal cyst    History of hepatitis C    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, intellectual disability, and dysmorphic features ([facility] )    Crohn's disease without complication (HCC)    Cigarette nicotine dependence without complication    Multigravida of advanced maternal age in third trimester    Tobacco smoking affecting pregnancy, antepartum    History of  section    Previous child with congenital anomaly, currently pregnant, antepartum    Dermatitis    38 weeks gestation of pregnancy    Psychosocial stressors    Anxiety in pregnancy in third trimester, antepartum    Depression affecting pregnancy in third trimester, antepartum    Pregnancy    Suboxone maintenance treatment complicating pregnancy, antepartum, third trimester ([facility]       Baby complications/comments: none    Review of Systems    OB History    Para Term  AB Living   6 2 2   3 3   SAB TAB Ectopic Multiple Live Births     3   1 3      # Outcome Date GA Lbr Toro/2nd Weight Sex Delivery Anes PTL Lv   6 Current            5 Term 12 40w0d  3742 g (8 lb 4 oz) F Vag-Spont EPI     4A Term 04 37w0d  2637 g (5 lb 13 oz) F Vag-Spont EPI  GRACE   4B Term 04 37w0d  2608 g (5 lb 12 oz) M CS-LTranv Gen  GRACE      Complications: Fetal Intolerance   3 TAB      TAB         Birth Comments: surgical    2 TAB      TAB         Birth Comments: surgical   1 TAB      TAB         Birth Comments: surgical      Obstetric Comments   ABNORMAL MENSES       Past Medical History:   Diagnosis Date    Arthritis     shoulders    Crohn disease ([facility]     Depression     [facility]     Ganglion cyst of wrist, right     LAST ASSESSED: [id number]    Hepatitis C     dx 15 yrs ago-2015 retested told undetectable    Hepatitis C virus infection     LAST ASSESSED: [id number]    Hordeolum externum left eye, unspecified eyelid     LAST ASSESSED: [id number]    Pyelonephritis     pyelonephritis    Shingles     Stargardt's disease     Stargardts Disease     dx when 23years old    Tear of right scapholunate ligament     LAST ASSESSED: [id number]    Varicella     as child    Wears glasses     reading       Past Surgical History:   Procedure Laterality Date     SECTION      LAST ASSESSED: [id number]    DENTAL SURGERY      INDUCED       SURIGCAL TREATMENT FOR     LIVER BIOPSY N/A     MT ESOPHAGOGASTRODUODENOSCOPY TRANSORAL DIAGNOSTIC N/A 2016    Procedure: ESOPHAGOGASTRODUODENOSCOPY (EGD); Surgeon: Antonietta Lozano MD;  Location: [facility];   Service: Gastroenterology       Social History Tobacco Use    Smoking status: Current Every Day Smoker     Packs/day: 1 00     Years: 25 00     Pack years: 25 00     Types: Cigarettes    Smokeless tobacco: Never Used   Substance Use Topics    Alcohol use: No     Frequency: Never     Comment: former ETOH abuse-per pt       No Known Allergies    Medications Prior to Admission   Medication    albuterol (Ventolin HFA) 90 mcg/act inhaler    buprenorphine-naloxone (SUBOXONE) 2-0 5 mg    mesalamine (PENTASA) 500 mg CR capsule    naloxone (NARCAN) 4 mg/0 1 mL nasal spray    Prenatal Vit-Fe Fumarate-FA (PRENATAL 1+1 PO)    sertraline (ZOLOFT) 100 mg tablet    sertraline (ZOLOFT) 25 mg tablet           OBJECTIVE:  Vitals:  Temp:  [97 9 °F (36 6 °C)-98 8 °F (37 1 °C)] 98 8 °F (37 1 °C)  HR:  [100] 100  BP: (122-152)/(75-83) 131/75  There is no height or weight on file to calculate BMI       Physical Exam:  Constitutional: AAOx3  CV: +S1, +S2, no murmurs appreciated  Resp: No respiratory distress  Abdominal: Gravid uterus  Psychiatric: Behavioral normal    FHT:  Baseline Rate: 135 bpm  Variability: Moderate 6-25 bpm  Accelerations: 15 x 15 or greater, At variable times  Decelerations: Variable  FHR Category: Category II    TOCO:   Contraction Frequency (minutes): Irritability  Contraction Duration (seconds): N/A      Lab Results   Component Value Date    WBC 12 16 (H) 12/28/2020    HGB 11 8 12/28/2020    HCT 36 3 12/28/2020     12/28/2020     Lab Results   Component Value Date     10/09/2015    K 3 7 10/29/2020    CL 99 (L) 10/29/2020    CO2 23 10/29/2020    BUN 7 10/29/2020    CREATININE 0 40 (L) 10/29/2020    GLUCOSE 87 10/09/2015    AST 10 10/29/2020    ALT 17 10/29/2020       Prenatal Labs:   Blood Type:   Lab Results   Component Value Date/Time    ABO Grouping A 06/30/2020 12:24 PM     , D (Rh type):   Lab Results   Component Value Date/Time    Rh Factor Positive 06/30/2020 12:24 PM   , HCT/HGB:   Lab Results   Component Value Date/Time Hematocrit 36 3 12/28/2020 04:58 PM    Hematocrit 39 6 10/09/2015 12:08 PM    Hemoglobin 11 8 12/28/2020 04:58 PM    Hemoglobin 13 7 10/09/2015 12:08 PM      , Platelets:   Lab Results   Component Value Date/Time    Platelets 004 37/99/1963 04:58 PM    Platelets 231 40/01/1020 12:08 PM      , 1 hour Glucola:   Lab Results   Component Value Date/Time    Glucose 138 (H) 10/17/2020 09:34 AM   , Rubella:   Lab Results   Component Value Date/Time    Rubella IgG Quant 89 7 06/30/2020 12:24 PM        , VDRL/RPR:   Lab Results   Component Value Date/Time    RPR Non-Reactive 10/17/2020 09:34 AM      , Hep B:   Lab Results   Component Value Date/Time    Hepatitis B Surface Ag Non-reactive 06/30/2020 12:24 PM     , HIV:   Lab Results   Component Value Date/Time    HIV-1/HIV-2 Ab Non-Reactive 06/30/2020 12:24 PM     , Group B Strep:  Normal  UDS: Negative      Kaleb Moreno DO  OB/GYN PGY-1  12/28/2020  6:12 PM

## 2020-12-28 NOTE — OB LABOR/OXYTOCIN SAFETY PROGRESS
Labor Progress Note - Mirtha Patel 41 y o  female MRN: [id number]    Unit/Bed#: -01 Encounter: [id number]       Contraction Frequency (minutes): Irritability     Tachysystole: No   Cervical Dilation: 1        Cervical Effacement: 60  Fetal Station: -2  Baseline Rate: 135 bpm  Fetal Heart Rate: 132 BPM  FHR Category: Category II               Vital Signs:   Vitals:    12/28/20 1808   BP: 119/73   Pulse: 93   Resp: 16   Temp:            Notes/comments:     SVE as above    PROCEDURE:  PRYOR BALLOON PLACEMENT    A 24F pryor with a 30cc balloon was selected, SVE was performed and cervix was located, pryor was introduced over sterile gloved hands  Balloon advanced through cervix beyond the internal cervical os  A small amount amount of sterile saline solution was instilled in the balloon to confirm placement  Placement was confirmed to be beyond the internal cervical os  A total of 60cc of sterile saline solution was placed into the balloon  Pt tolerated well  Instructions left with RN to place pryor to gravity with a 1L bag of IV fluid  Notify MD/  when pryor dislodged      Dr Virgilio Rogers notified    Remigio Walker DO 12/28/2020 6:30 PM

## 2020-12-29 ENCOUNTER — ANESTHESIA EVENT (INPATIENT)
Dept: ANESTHESIOLOGY | Facility: HOSPITAL | Age: 40
End: 2020-12-29
Payer: COMMERCIAL

## 2020-12-29 ENCOUNTER — ANESTHESIA (INPATIENT)
Dept: ANESTHESIOLOGY | Facility: HOSPITAL | Age: 40
End: 2020-12-29
Payer: COMMERCIAL

## 2020-12-29 PROBLEM — O34.219 VAGINAL BIRTH AFTER CESAREAN: Status: ACTIVE | Noted: 2020-12-29

## 2020-12-29 LAB
BASE EXCESS BLDCOA CALC-SCNC: -6.9 MMOL/L (ref 3–11)
BASE EXCESS BLDCOV CALC-SCNC: -1.7 MMOL/L (ref 1–9)
HCO3 BLDCOA-SCNC: 22.9 MMOL/L (ref 17.3–27.3)
HCO3 BLDCOV-SCNC: 24 MMOL/L (ref 12.2–28.6)
O2 CT VFR BLDCOA CALC: 7.3 ML/DL
OXYHGB MFR BLDCOA: 31.7 %
OXYHGB MFR BLDCOV: 74.8 %
PCO2 BLDCOA: 63.7 MM[HG] (ref 30–60)
PCO2 BLDCOV: 43.6 MM HG (ref 27–43)
PH BLDCOA: 7.17 [PH] (ref 7.23–7.43)
PH BLDCOV: 7.36 [PH] (ref 7.19–7.49)
PO2 BLDCOA: 17.3 MM HG (ref 5–25)
PO2 BLDCOV: 31.6 MM HG (ref 15–45)
RPR SER QL: NORMAL
SAO2 % BLDCOV: 18.1 ML/DL

## 2020-12-29 PROCEDURE — 82805 BLOOD GASES W/O2 SATURATION: CPT | Performed by: OBSTETRICS & GYNECOLOGY

## 2020-12-29 PROCEDURE — 10H07YZ INSERTION OF OTHER DEVICE INTO PRODUCTS OF CONCEPTION, VIA NATURAL OR ARTIFICIAL OPENING: ICD-10-PCS | Performed by: OBSTETRICS & GYNECOLOGY

## 2020-12-29 PROCEDURE — NC001 PR NO CHARGE: Performed by: OBSTETRICS & GYNECOLOGY

## 2020-12-29 PROCEDURE — 10907ZC DRAINAGE OF AMNIOTIC FLUID, THERAPEUTIC FROM PRODUCTS OF CONCEPTION, VIA NATURAL OR ARTIFICIAL OPENING: ICD-10-PCS | Performed by: OBSTETRICS & GYNECOLOGY

## 2020-12-29 RX ORDER — DIAPER,BRIEF,INFANT-TODD,DISP
1 EACH MISCELLANEOUS 4 TIMES DAILY PRN
Status: DISCONTINUED | OUTPATIENT
Start: 2020-12-29 | End: 2020-12-31 | Stop reason: HOSPADM

## 2020-12-29 RX ORDER — DOCUSATE SODIUM 100 MG/1
100 CAPSULE, LIQUID FILLED ORAL 2 TIMES DAILY
Status: DISCONTINUED | OUTPATIENT
Start: 2020-12-30 | End: 2020-12-31 | Stop reason: HOSPADM

## 2020-12-29 RX ORDER — CALCIUM CARBONATE 200(500)MG
1000 TABLET,CHEWABLE ORAL DAILY PRN
Status: DISCONTINUED | OUTPATIENT
Start: 2020-12-29 | End: 2020-12-31 | Stop reason: HOSPADM

## 2020-12-29 RX ORDER — BUPIVACAINE HYDROCHLORIDE 2.5 MG/ML
INJECTION, SOLUTION EPIDURAL; INFILTRATION; INTRACAUDAL AS NEEDED
Status: DISCONTINUED | OUTPATIENT
Start: 2020-12-29 | End: 2020-12-30 | Stop reason: HOSPADM

## 2020-12-29 RX ORDER — ONDANSETRON 2 MG/ML
4 INJECTION INTRAMUSCULAR; INTRAVENOUS EVERY 4 HOURS PRN
Status: DISCONTINUED | OUTPATIENT
Start: 2020-12-29 | End: 2020-12-31 | Stop reason: HOSPADM

## 2020-12-29 RX ORDER — SENNOSIDES 8.6 MG
1 TABLET ORAL DAILY
Status: DISCONTINUED | OUTPATIENT
Start: 2020-12-30 | End: 2020-12-31 | Stop reason: HOSPADM

## 2020-12-29 RX ORDER — OXYTOCIN/RINGER'S LACTATE 30/500 ML
250 PLASTIC BAG, INJECTION (ML) INTRAVENOUS CONTINUOUS
Status: ACTIVE | OUTPATIENT
Start: 2020-12-30 | End: 2020-12-30

## 2020-12-29 RX ORDER — LIDOCAINE HYDROCHLORIDE AND EPINEPHRINE 15; 5 MG/ML; UG/ML
INJECTION, SOLUTION EPIDURAL AS NEEDED
Status: DISCONTINUED | OUTPATIENT
Start: 2020-12-29 | End: 2020-12-30 | Stop reason: HOSPADM

## 2020-12-29 RX ORDER — IBUPROFEN 600 MG/1
600 TABLET ORAL EVERY 6 HOURS PRN
Status: DISCONTINUED | OUTPATIENT
Start: 2020-12-29 | End: 2020-12-31 | Stop reason: HOSPADM

## 2020-12-29 RX ORDER — ACETAMINOPHEN 325 MG/1
650 TABLET ORAL EVERY 4 HOURS PRN
Status: DISCONTINUED | OUTPATIENT
Start: 2020-12-29 | End: 2020-12-31 | Stop reason: HOSPADM

## 2020-12-29 RX ORDER — DIPHENHYDRAMINE HYDROCHLORIDE 50 MG/ML
25 INJECTION INTRAMUSCULAR; INTRAVENOUS EVERY 6 HOURS PRN
Status: DISCONTINUED | OUTPATIENT
Start: 2020-12-29 | End: 2020-12-31 | Stop reason: HOSPADM

## 2020-12-29 RX ORDER — ACETAMINOPHEN 325 MG/1
650 TABLET ORAL EVERY 6 HOURS PRN
Status: DISCONTINUED | OUTPATIENT
Start: 2020-12-29 | End: 2020-12-29 | Stop reason: SDUPTHER

## 2020-12-29 RX ORDER — SIMETHICONE 80 MG
80 TABLET,CHEWABLE ORAL 4 TIMES DAILY PRN
Status: DISCONTINUED | OUTPATIENT
Start: 2020-12-29 | End: 2020-12-31 | Stop reason: HOSPADM

## 2020-12-29 RX ORDER — ROPIVACAINE HYDROCHLORIDE 2 MG/ML
INJECTION, SOLUTION EPIDURAL; INFILTRATION; PERINEURAL CONTINUOUS PRN
Status: DISCONTINUED | OUTPATIENT
Start: 2020-12-29 | End: 2020-12-30 | Stop reason: HOSPADM

## 2020-12-29 RX ORDER — MAGNESIUM HYDROXIDE/ALUMINUM HYDROXICE/SIMETHICONE 120; 1200; 1200 MG/30ML; MG/30ML; MG/30ML
15 SUSPENSION ORAL EVERY 6 HOURS PRN
Status: DISCONTINUED | OUTPATIENT
Start: 2020-12-29 | End: 2020-12-31 | Stop reason: HOSPADM

## 2020-12-29 RX ADMIN — ACETAMINOPHEN 650 MG: 325 TABLET, FILM COATED ORAL at 20:31

## 2020-12-29 RX ADMIN — BUPIVACAINE HYDROCHLORIDE 5 ML: 2.5 INJECTION, SOLUTION EPIDURAL; INFILTRATION; INTRACAUDAL at 21:57

## 2020-12-29 RX ADMIN — ONDANSETRON 4 MG: 2 INJECTION INTRAMUSCULAR; INTRAVENOUS at 21:10

## 2020-12-29 RX ADMIN — MESALAMINE 500 MG: 250 CAPSULE ORAL at 15:58

## 2020-12-29 RX ADMIN — SODIUM CHLORIDE, SODIUM LACTATE, POTASSIUM CHLORIDE, AND CALCIUM CHLORIDE 125 ML/HR: .6; .31; .03; .02 INJECTION, SOLUTION INTRAVENOUS at 20:36

## 2020-12-29 RX ADMIN — SODIUM CHLORIDE, SODIUM LACTATE, POTASSIUM CHLORIDE, AND CALCIUM CHLORIDE 125 ML/HR: .6; .31; .03; .02 INJECTION, SOLUTION INTRAVENOUS at 01:33

## 2020-12-29 RX ADMIN — LIDOCAINE HYDROCHLORIDE AND EPINEPHRINE 5 ML: 15; 5 INJECTION, SOLUTION EPIDURAL at 13:23

## 2020-12-29 RX ADMIN — BUPRENORPHINE AND NALOXONE 1 FILM: 2; .5 FILM BUCCAL; SUBLINGUAL at 09:30

## 2020-12-29 RX ADMIN — Medication 10 ML/HR: at 13:25

## 2020-12-29 RX ADMIN — DIPHENHYDRAMINE HYDROCHLORIDE 25 MG: 50 INJECTION, SOLUTION INTRAMUSCULAR; INTRAVENOUS at 16:20

## 2020-12-29 RX ADMIN — ACETAMINOPHEN 650 MG: 325 TABLET, FILM COATED ORAL at 14:29

## 2020-12-29 RX ADMIN — SERTRALINE HYDROCHLORIDE 125 MG: 100 TABLET ORAL at 09:26

## 2020-12-29 RX ADMIN — SODIUM CHLORIDE, SODIUM LACTATE, POTASSIUM CHLORIDE, AND CALCIUM CHLORIDE 999 ML/HR: .6; .31; .03; .02 INJECTION, SOLUTION INTRAVENOUS at 18:38

## 2020-12-29 RX ADMIN — ROPIVACAINE HYDROCHLORIDE: 2 INJECTION, SOLUTION EPIDURAL; INFILTRATION at 13:30

## 2020-12-29 RX ADMIN — ROPIVACAINE HYDROCHLORIDE: 2 INJECTION, SOLUTION EPIDURAL; INFILTRATION at 20:44

## 2020-12-29 RX ADMIN — MESALAMINE 500 MG: 250 CAPSULE ORAL at 09:29

## 2020-12-29 RX ADMIN — SODIUM CHLORIDE, SODIUM LACTATE, POTASSIUM CHLORIDE, AND CALCIUM CHLORIDE 125 ML/HR: .6; .31; .03; .02 INJECTION, SOLUTION INTRAVENOUS at 10:47

## 2020-12-29 RX ADMIN — Medication 250 MILLI-UNITS/MIN: at 23:47

## 2020-12-29 RX ADMIN — SODIUM CHLORIDE, SODIUM LACTATE, POTASSIUM CHLORIDE, AND CALCIUM CHLORIDE 999 ML/HR: .6; .31; .03; .02 INJECTION, SOLUTION INTRAVENOUS at 12:51

## 2020-12-29 RX ADMIN — SODIUM CHLORIDE, SODIUM LACTATE, POTASSIUM CHLORIDE, AND CALCIUM CHLORIDE 125 ML/HR: .6; .31; .03; .02 INJECTION, SOLUTION INTRAVENOUS at 14:42

## 2020-12-29 RX ADMIN — MESALAMINE 500 MG: 250 CAPSULE ORAL at 21:27

## 2020-12-29 NOTE — DISCHARGE INSTRUCTIONS
Self Care After Delivery   AMBULATORY CARE:   The postpartum period  is the period of time from delivery to about 6 weeks  During this time you may experience many physical and emotional changes  It is important to understand what is normal and when you need to call your healthcare provider  It is also important to know how to care for yourself during this time  Call your local emergency number (911 in the [address] [facility]) for any of the following:   · You see or hear things that are not there, or have thoughts of harming yourself or your baby  · You soak through 1 pad in 15 minutes, have blurry vision, clammy or pale skin, and feel faint  · You faint or lose consciousness  · You have trouble breathing  · You cough up blood  · Your  incision comes apart  Seek care immediately if:   · Your heart is beating faster than usual     · You have a bad headache or changes in your vision  · Your episiotomy or  incision is red, swollen, bleeding, or draining pus  · You have severe abdominal pain  Call your doctor or obstetrician if:   · Your leg is painful, red, and larger than usual     · You soak through 1 or more pads in an hour, or pass blood clots larger than a quarter from your vagina  · You have a fever  · You have new or worsening pain in your abdomen or vagina  · You continue to have depression 1 to 2 weeks after you deliver  · You have trouble sleeping  · You have foul-smelling discharge from your vagina  · You have pain or burning when you urinate  · You do not have a bowel movement for 3 days or more  · You have nausea or are vomiting  · You have hard lumps or red streaks over your breasts  · You have cracked nipples or bleed from your nipples  · You have questions or concerns about your condition or care  Physical changes:   The following are normal changes after you give birth:  · Pain in the area between your anus and vagina    · Breast pain    · Constipation or hemorrhoids    · Hot or cold flashes    · Vaginal bleeding or discharge    · Mild to moderate abdominal cramping    · Difficulty controlling bowel movements or urine    Emotional changes:  A drop in hormone levels after you deliver may cause changes in your emotions  You may feel irritable, sad, or anxious  You may cry easily or for no reason  You may also feel depressed  Depression that continues can be a sign of postpartum depression, a condition that can be treated  Treatment may include talk therapy, medicines, or both  Healthcare providers will ask how you are feeling and if you have any depression  These talks can happen during appointments for your medical care and for your baby's care, such as well child visits  Providers can help you find ways to care for yourself and your baby  Talk to your providers about the following:  · When emotional changes or depression started, and if it is getting worse over time    · Problems you are having with daily activities, sleep, or caring for your baby    · If anything makes you feel worse, or makes you feel better    · Feeling that you are not bonding with your baby the way you want    · Any problems your baby has with sleeping or feeding    · Your baby is fussy or cries a lot    · Support you have from friends, family, or others    Breast care for breastfeeding mothers: You may have sore breasts for 3 to 6 days after you give birth  This happens as your milk begins to fill your breasts  You may also have sore breasts if you do not breastfeed frequently  Do the following to care for your breasts:  · Apply a moist, warm, compress to your breast as directed  This may help soothe your breasts  Make sure the washcloth is not too hot before you apply it to your breast     · Nurse your baby or pump your milk frequently  This may prevent clogged milk ducts  Ask your healthcare provider how often to nurse or pump      · Massage your breasts as directed  This may help increase your milk flow  Gently rub your breasts in a circular motion before you breastfeed  You may need to gently squeeze your breast or nipple to help release milk  You can also use a breast pump to help release milk from your breast     · Wash your breasts with warm water only  Do not put soap on your nipples  Soap may cause your nipples to become dry  · Apply lanolin cream to your nipples as directed  Lanolin cream may add moisture to your skin and prevent nipple dryness  Always  wash off lanolin cream with warm water before you breastfeed  · Place pads in your bra  Your nipples may leak milk when you are not breastfeeding  You can place pads inside of your bra to help prevent leaking onto your clothing  Ask your healthcare provider where to purchase bra pads  · Get breastfeeding support if needed  Healthcare providers can answer questions about breastfeeding and provide you with support  Ask your healthcare provider who you can contact if you need breastfeeding support  Breast care for non-breastfeeding mothers:  Milk will fill your breasts even if you bottle feed your baby  Do the following to help stop your milk from filling your breasts and causing pain:  · Wear a bra with support at all times  A sports bra or a tight-fitting bra will help stop your milk from coming in  · Apply ice on each breast for 15 to 20 minutes every hour or as directed  Use an ice pack, or put crushed ice in a plastic bag  Cover it with a towel before you apply it to your breast  Ice helps your milk ducts shrink  · Keep your breasts away from warm water  Warm water will make it easier for milk to fill your breasts  Stand with your breasts away from warm water in the shower  · Limit how much you touch your breasts  This will prevent them from filling with milk  Perineum care: Your perineum is the area between your rectum and vagina   It is normal to have swelling and pain in this area after you give birth  If you had an episiotomy, your healthcare provider may give you special instructions  · Clean your perineum after you use the bathroom  This may prevent infection and help with healing  Use a spray bottle with warm water to clean your perineum  You may also gently spray warm water against your perineum when you urinate  Always wipe front to back  · Take a sitz bath as directed  A sitz bath may help relieve swelling and pain  Fill your bath tub or bucket with water up to your hips and sit in the water  Use cold water for 2 days after you deliver  Then use warm water  Ask your healthcare provider for more information about a sitz bath  · Apply ice packs for the first 24 hours or as directed  Use a plastic glove filled with ice or buy an ice pack  Wrap the ice pack or plastic glove in a small towel or wash cloth  Place the ice pack on your perineum for 20 minutes at a time  · Sit on a donut-shaped pillow  This may relieve pressure on your perineum when you sit  · Use wipes that contain medicine or take pills as directed  Your healthcare provider may tell you to use witch hazel pads  You can place witch hazel pads in the refrigerator before you apply them to your perineum  Your provider may also tell you to take NSAIDs  Ask him or her how often to take pills or use the wipes  · Do not go swimming or take tub baths for 4 to 6 weeks or as directed  This will help prevent an infection in your vagina or uterus  Bowel and bladder care: It may take 3 to 5 days to have a bowel movement after you deliver your baby  You can do the following to prevent or manage constipation, and get control of your bowel or bladder:  · Take stool softeners as directed  A stool softener is medicine that will make your bowel movements softer  This may prevent or relieve constipation  A stool softener may also make bowel movements less painful  · Drink plenty of liquids    Ask how much liquid to drink each day and which liquids are best for you  Liquids may help prevent constipation  · Eat foods high in fiber  Examples include fruits, vegetables, grains, beans, and lentils  Ask your healthcare provider how much fiber you need each day  Fiber may prevent constipation  · Do Kegel exercises as directed  Kegel exercises will help strengthen the muscles that control bowel movements and urination  Ask your healthcare provider for more information on Kegel exercises  · Apply cold compresses or medicine to hemorrhoids as directed  This may relieve swelling and pain  Your healthcare provider may tell you to apply ice or wipes that contain medicine to your hemorrhoids  He or she may also tell you to use a sitz bath  Ask your provider for more information on how to manage hemorrhoids  Nutrition:  Good nutrition is important in the postpartum period  It will help you return to a healthy weight, increase your energy levels, and prevent constipation  It will also help you get enough nutrients and calories if you are going to breastfeed your baby  · Eat a variety of healthy foods  Healthy foods include fruits, vegetables, whole-grain breads, low-fat dairy products, beans, lean meats, and fish  You may need 500 to 700 extra calories each day if you breastfeed your baby  You may also need extra protein  · Limit foods with added sugar and high amounts of fat  These foods are high in calories and low in healthy nutrients  Read food labels so you know how much sugar and fat is in the food you want to eat  · Drink 8 to 10 glasses of water per day  Water will help you make plenty of milk for your baby  It will also help prevent constipation  Drink a glass of water every time you breastfeed your baby  · Take vitamins as directed  Ask your healthcare provider what vitamins you need  · Limit caffeine and alcohol if you are breastfeeding    Caffeine and alcohol can get into your breast milk  Caffeine and alcohol can make your baby fussy  They can also interfere with your baby's sleep  Ask your healthcare provider if you can drink alcohol or caffeine  Rest and sleep: You may feel very tired in the postpartum period  Enough sleep will help you heal and give you energy to care for your baby  The following may help you get sleep and rest:  · Nap when your baby naps  Your baby may nap several times during the day  Get rest during this time  · Limit visitors  Many people may want to see you and your baby  Ask friends or family to visit on different days  This will give you time to rest     · Do not plan too much for one day  Put off household chores so that you have time to rest  Gradually do more each day  · Ask for help from family, friends, or neighbors  Ask them to help you with laundry, cleaning, or errands  Also ask someone to watch the baby while you take a nap or relax  Ask your partner to help with the care of your baby  Pump some of your breast milk so your partner can feed your baby during the night  Exercise after delivery:  Wait until your healthcare provider says it is okay to exercise  Exercise can help you lose weight, increase your energy levels, and manage your mood  It can also prevent constipation and blood clots  Start with gentle exercises such as walking  Do more as you have more energy  You may need to avoid abdominal exercises for 1 to 2 weeks after you deliver  Talk to your healthcare provider about an exercise plan that is right for you  Sexual activity after delivery:   · Do not have sex until your healthcare provider says it is okay  You may need to wait 4 to 6 weeks before you have sex  This may prevent infection and allow time to heal     · Your menstrual cycle may begin as soon as 3 weeks after you deliver  Your period may be delayed if you breastfeed your baby  You can become pregnant before you get your first postpartum period   Talk to your healthcare provider about birth control that is right for you  Some types of birth control are not safe during breastfeeding  For support and more information:  Join a support group for new mothers  Ask for help from family and friends with chores, errands, and care of your baby  · Office of Women's Health,  Department of Health and Human Services  [address], [address] [facility] , [place] [facility] [address]  [address], [address] [facility] , [place] [facility] [address]  Phone: 3- 025 [phone]  Web Address: www womenshealth gov  · March of [facility] Postpartum [address] [facility] , [address]  [address] [facility] , [address] [facility]  Web Address: [employer]  [employer]/pregnancy/postpartum-care  aspx  Follow up with your doctor or obstetrician as directed: You will need to follow up within 2 to 6 weeks of delivery  Write down your questions so you remember to ask them at your visits  © Copyright [address] Information is for End User's use only and may not be sold, redistributed or otherwise used for commercial purposes  All illustrations and images included in CareNotes® are the copyrighted property of [employer]  or [address] [place], [place] [address]lexx   The above information is an  only  It is not intended as medical advice for individual conditions or treatments  Talk to your doctor, nurse or pharmacist before following any medical regimen to see if it is safe and effective for you

## 2020-12-29 NOTE — OB LABOR/OXYTOCIN SAFETY PROGRESS
Oxytocin Safety Progress Check Note - Mirtha Patel 41 y o  female MRN: [id number]    Unit/Bed#: -01 Encounter: [id number]    Dose (kelley-units/min) Oxytocin: 14 kelley-units/min  Contraction Frequency (minutes): 2-2  5(palpable at bedside)  Contraction Quality: Mild  Tachysystole: No   Cervical Dilation: 3-4        Cervical Effacement: 70  Fetal Station: -2  Baseline Rate: 135 bpm  Fetal Heart Rate: 130 BPM  FHR Category: Category I  Oxytocin Safety Progress Check: Safety check completed            Vital Signs:   Vitals:    12/29/20 1100   BP: 119/80   Pulse: 82   Resp:    Temp:    SpO2:            Notes/comments:     SVE as above  Pit at 14  Cervix still firm, not a laboring cervix  Will AROM with next check, but d/w Mirtha that she wants epidural first  D/w Dr Isaiah Vidal, DO 12/29/2020 11:13 AM

## 2020-12-29 NOTE — OB LABOR/OXYTOCIN SAFETY PROGRESS
Labor Progress Note - Mirtha Patel 41 y o  female MRN: [id number]    Unit/Bed#: -01 Encounter: [id number]    Dose (kelley-units/min) Oxytocin: 4 kelley-units/min  Contraction Frequency (minutes): 2  Contraction Quality: Mild  Tachysystole: No   Cervical Dilation: 3-4        Cervical Effacement: 60  Fetal Station: -2  Baseline Rate: 125 bpm  Fetal Heart Rate: 130 BPM  FHR Category: Category I  Oxytocin Safety Progress Check: Safety check completed            Vital Signs:   Vitals:    12/28/20 2345   BP: 118/67   Pulse: 91   Resp:    Temp:            Notes/comments:   Pt comfortable, tolerating pitocin  Núñez balloon was expelled recently  SVE 3-4/60/-2  Continue pitocin titration, consider AROM at next check     D/w Dr Julien Mallory MD 12/29/2020 12:19 AM

## 2020-12-29 NOTE — OB LABOR/OXYTOCIN SAFETY PROGRESS
Oxytocin Safety Progress Check Note - Mirtha Patel 41 y o  female MRN: [id number]    Unit/Bed#: -01 Encounter: [id number]    Dose (kelley-units/min) Oxytocin: 20 kelley-units/min  Contraction Frequency (minutes): 2-3  Contraction Quality: Mild  Tachysystole: No   Cervical Dilation: 4-5        Cervical Effacement: 70  Fetal Station: -2  Baseline Rate: 130 bpm  Fetal Heart Rate: 147 BPM  FHR Category: Category I  Oxytocin Safety Progress Check: Safety check completed            Vital Signs:   Vitals:    12/29/20 1607   BP: 122/77   Pulse: 93   Resp:    Temp:    SpO2:            Notes/comments:     SVE as above  Will try IV benadryl for cervical swelling  Cervix getting soft  D/w Dr Robbi Delgado,  12/29/2020 4:15 PM

## 2020-12-29 NOTE — PLAN OF CARE
Problem: PAIN - ADULT  Goal: Verbalizes/displays adequate comfort level or baseline comfort level  Description: Interventions:  - Encourage patient to monitor pain and request assistance  - Assess pain using appropriate pain scale  - Administer analgesics based on type and severity of pain and evaluate response  - Implement non-pharmacological measures as appropriate and evaluate response  - Consider cultural and social influences on pain and pain management  - Notify physician/advanced practitioner if interventions unsuccessful or patient reports new pain  Outcome: Progressing     Problem: INFECTION - ADULT  Goal: Absence or prevention of progression during hospitalization  Description: INTERVENTIONS:  - Assess and monitor for signs and symptoms of infection  - Monitor lab/diagnostic results  - Monitor all insertion sites, i e  indwelling lines, tubes, and drains  - Monitor endotracheal if appropriate and nasal secretions for changes in amount and color  - [place] appropriate cooling/warming therapies per order  - Administer medications as ordered  - Instruct and encourage patient and family to use good hand hygiene technique  - Identify and instruct in appropriate isolation precautions for identified infection/condition  Outcome: Progressing  Goal: Absence of fever/infection during neutropenic period  Description: INTERVENTIONS:  - Monitor WBC    Outcome: Progressing     Problem: SAFETY ADULT  Goal: Patient will remain free of falls  Description: INTERVENTIONS:  - Assess patient frequently for physical needs  -  Identify cognitive and physical deficits and behaviors that affect risk of falls    -  [place] fall precautions as indicated by assessment   - Educate patient/family on patient safety including physical limitations  - Instruct patient to call for assistance with activity based on assessment  - Modify environment to reduce risk of injury  - Consider OT/PT consult to assist with strengthening/mobility  Outcome: Progressing  Goal: Maintain or return to baseline ADL function  Description: INTERVENTIONS:  -  Assess patient's ability to carry out ADLs; assess patient's baseline for ADL function and identify physical deficits which impact ability to perform ADLs (bathing, care of mouth/teeth, toileting, grooming, dressing, etc )  - Assess/evaluate cause of self-care deficits   - Assess range of motion  - Assess patient's mobility; develop plan if impaired  - Assess patient's need for assistive devices and provide as appropriate  - Encourage maximum independence but intervene and supervise when necessary  - Involve family in performance of ADLs  - Assess for home care needs following discharge   - Consider OT consult to assist with ADL evaluation and planning for discharge  - Provide patient education as appropriate  Outcome: Progressing  Goal: Maintain or return mobility status to optimal level  Description: INTERVENTIONS:  - Assess patient's baseline mobility status (ambulation, transfers, stairs, etc )    - Identify cognitive and physical deficits and behaviors that affect mobility  - Identify mobility aids required to assist with transfers and/or ambulation (gait belt, sit-to-stand, lift, walker, cane, etc )  - [place] fall precautions as indicated by assessment  - Record patient progress and toleration of activity level on Mobility SBAR; progress patient to next Phase/Stage  - Instruct patient to call for assistance with activity based on assessment  - Consider rehabilitation consult to assist with strengthening/weightbearing, etc   Outcome: Progressing     Problem: Knowledge Deficit  Goal: Patient/family/caregiver demonstrates understanding of disease process, treatment plan, medications, and discharge instructions  Description: Complete learning assessment and assess knowledge base    Interventions:  - Provide teaching at level of understanding  - Provide teaching via preferred learning methods  Outcome: Progressing  Goal: Verbalizes understanding of labor plan  Description: Assess patient/family/caregiver's baseline knowledge level and ability to understand information  Provide education via patient/family/caregiver's preferred learning method at appropriate level of understanding  1  Provide teaching at level of understanding  2  Provide teaching via preferred learning method(s)  Outcome: Progressing     Problem: DISCHARGE PLANNING  Goal: Discharge to home or other facility with appropriate resources  Description: INTERVENTIONS:  - Identify barriers to discharge w/patient and caregiver  - Arrange for needed discharge resources and transportation as appropriate  - Identify discharge learning needs (meds, wound care, etc )  - Arrange for interpretive services to assist at discharge as needed  - Refer to Case Management Department for coordinating discharge planning if the patient needs post-hospital services based on physician/advanced practitioner order or complex needs related to functional status, cognitive ability, or social support system  Outcome: Progressing     Problem: Labor & Delivery  Goal: Manages discomfort  Description: Assess and monitor for signs and symptoms of discomfort  Assess patient's pain level regularly and per hospital policy  Administer medications as ordered  Support use of nonpharmacological methods to help control pain such as distraction, imagery, relaxation, and application of heat and cold  Collaborate with interdisciplinary team and patient to determine appropriate pain management plan  1  Include patient in decisions related to comfort  2  Offer non-pharmacological pain management interventions  3  Report ineffective pain management to physician  Outcome: Progressing  Goal: Patient vital signs are stable  Description: 1  Assess vital signs - vaginal delivery    Outcome: Progressing

## 2020-12-29 NOTE — OB LABOR/OXYTOCIN SAFETY PROGRESS
Oxytocin Safety Progress Check Note - Mirtha Patel 41 y o  female MRN: [id number]    Unit/Bed#: -01 Encounter: [id number]    Dose (kelley-units/min) Oxytocin: 10 kelley-units/min(per Dr Merlin Stai)  Contraction Frequency (minutes): 3-5  Contraction Quality: Moderate  Tachysystole: No   Cervical Dilation: 4-5        Cervical Effacement: 80  Fetal Station: 0  Baseline Rate: 140 bpm  Fetal Heart Rate: 139 BPM  FHR Category: Category I  Oxytocin Safety Progress Check: Safety check completed            Vital Signs: /61   Pulse 83   Temp 97 9 °F (36 6 °C) (Oral)   Resp 16   Ht 5' 9" (1 753 m)   Wt 88 9 kg (196 lb)   LMP 04/12/2020 (Approximate)   SpO2 95%   BMI 28 94 kg/m²     Vitals:    12/29/20 1806   BP: 112/61   Pulse: 83   Resp:    Temp:    SpO2:            Notes/comments:     Overall comfortable with epidural  Good descent - unable to assess contraction pattern at high pitocin dose  IUPC placed and pitocin decreased - will resume titration if needed  Continue to progress      Emre Agudelo MD 12/29/2020 6:08 PM

## 2020-12-29 NOTE — OB LABOR/OXYTOCIN SAFETY PROGRESS
Labor Progress Note - Mirtha Patel 41 y o  female MRN: [id number]    Unit/Bed#: -01 Encounter: [id number]       Contraction Frequency (minutes): irregular/irritability  Contraction Quality: Mild  Tachysystole: No   Cervical Dilation: 2        Cervical Effacement: 50  Fetal Station: -3  Baseline Rate: 130 bpm  Fetal Heart Rate: 138 BPM  FHR Category: Category II               Vital Signs:   Vitals:    12/28/20 1917   BP: 118/74   Pulse: 82   Resp: 20   Temp: 98 4 °F (36 9 °C)           Notes/comments:   Pt prescott balloon quickly fell out once patient stood up to use bathroom  SVE 2/50/-2  It is likely that initial prescott balloon was not in the cervix  68ZT Silicon Prescott catheter was inserted just past the internal os of the cervix at this time  Balloon inflated with 60cc sterile LR  Will wait a few hours to start pitocin titration  D/W Dr Thierno Stone MD 12/28/2020 8:56 PM

## 2020-12-29 NOTE — OB LABOR/OXYTOCIN SAFETY PROGRESS
Oxytocin Safety Progress Check Note - Mirtha Patel 41 y o  female MRN: [id number]    Unit/Bed#: -01 Encounter: [id number]    Dose (kelley-units/min) Oxytocin: 0 kelley-units/min(pitocin break per Dr Dedra Haskins)  Contraction Frequency (minutes): 2-3  Contraction Quality: Mild, Moderate  Tachysystole: No   Cervical Dilation: 3-4        Cervical Effacement: 70  Fetal Station: -2  Baseline Rate: 125 bpm  Fetal Heart Rate: 125 BPM  FHR Category: Category I  Oxytocin Safety Progress Check: Safety check completed            Vital Signs:   Vitals:    12/29/20 0820   BP: 115/72   Pulse: 93   Resp:    Temp:    SpO2:            Notes/comments:     - Plan for pit break, eat light breakfast, shower w/ novii in place  - Will restart pitocin at 12  - Dr Kenyon Call bedside during my exam, in agreement with plan    Yusra Dey DO 12/29/2020 8:30 AM

## 2020-12-29 NOTE — OB LABOR/OXYTOCIN SAFETY PROGRESS
Labor Progress Note - Mirtha Patel 41 y o  female MRN: [id number]    Unit/Bed#: -01 Encounter: [id number]    Dose (kelley-units/min) Oxytocin: 18 kelley-units/min  Contraction Frequency (minutes): 2-3  Contraction Quality: Moderate, Mild  Tachysystole: No   Cervical Dilation: 3-4        Cervical Effacement: 70  Fetal Station: -2  Baseline Rate: 125 bpm  Fetal Heart Rate: 135 BPM  FHR Category: Category II  Oxytocin Safety Progress Check: Safety check completed            Vital Signs:   Vitals:    12/29/20 0516   BP: 112/72   Pulse: 81   Resp:    Temp:            Notes/comments:   Pt comfortable, tolerating pitocin titration  SVE unchanged at 3-4/70/-2  Pitocin at 16 mu/min, now being titrated up to 18 mu/min  Continue pitocin titration  Consider AROM at next check if further dilated  D/w Dr Wale Hicks MD 12/29/2020 5:24 AM

## 2020-12-29 NOTE — PLAN OF CARE
Problem: PAIN - ADULT  Goal: Verbalizes/displays adequate comfort level or baseline comfort level  Description: Interventions:  - Encourage patient to monitor pain and request assistance  - Assess pain using appropriate pain scale  - Administer analgesics based on type and severity of pain and evaluate response  - Implement non-pharmacological measures as appropriate and evaluate response  - Consider cultural and social influences on pain and pain management  - Notify physician/advanced practitioner if interventions unsuccessful or patient reports new pain  Outcome: Progressing     Problem: INFECTION - ADULT  Goal: Absence or prevention of progression during hospitalization  Description: INTERVENTIONS:  - Assess and monitor for signs and symptoms of infection  - Monitor lab/diagnostic results  - Monitor all insertion sites, i e  indwelling lines, tubes, and drains  - Monitor endotracheal if appropriate and nasal secretions for changes in amount and color  - [place] appropriate cooling/warming therapies per order  - Administer medications as ordered  - Instruct and encourage patient and family to use good hand hygiene technique  - Identify and instruct in appropriate isolation precautions for identified infection/condition  Outcome: Progressing  Goal: Absence of fever/infection during neutropenic period  Description: INTERVENTIONS:  - Monitor WBC    Outcome: Progressing     Problem: SAFETY ADULT  Goal: Patient will remain free of falls  Description: INTERVENTIONS:  - Assess patient frequently for physical needs  -  Identify cognitive and physical deficits and behaviors that affect risk of falls    -  [place] fall precautions as indicated by assessment   - Educate patient/family on patient safety including physical limitations  - Instruct patient to call for assistance with activity based on assessment  - Modify environment to reduce risk of injury  - Consider OT/PT consult to assist with strengthening/mobility  Outcome: Progressing  Goal: Maintain or return to baseline ADL function  Description: INTERVENTIONS:  -  Assess patient's ability to carry out ADLs; assess patient's baseline for ADL function and identify physical deficits which impact ability to perform ADLs (bathing, care of mouth/teeth, toileting, grooming, dressing, etc )  - Assess/evaluate cause of self-care deficits   - Assess range of motion  - Assess patient's mobility; develop plan if impaired  - Assess patient's need for assistive devices and provide as appropriate  - Encourage maximum independence but intervene and supervise when necessary  - Involve family in performance of ADLs  - Assess for home care needs following discharge   - Consider OT consult to assist with ADL evaluation and planning for discharge  - Provide patient education as appropriate  Outcome: Progressing  Goal: Maintain or return mobility status to optimal level  Description: INTERVENTIONS:  - Assess patient's baseline mobility status (ambulation, transfers, stairs, etc )    - Identify cognitive and physical deficits and behaviors that affect mobility  - Identify mobility aids required to assist with transfers and/or ambulation (gait belt, sit-to-stand, lift, walker, cane, etc )  - [place] fall precautions as indicated by assessment  - Record patient progress and toleration of activity level on Mobility SBAR; progress patient to next Phase/Stage  - Instruct patient to call for assistance with activity based on assessment  - Consider rehabilitation consult to assist with strengthening/weightbearing, etc   Outcome: Progressing     Problem: Knowledge Deficit  Goal: Patient/family/caregiver demonstrates understanding of disease process, treatment plan, medications, and discharge instructions  Description: Complete learning assessment and assess knowledge base    Interventions:  - Provide teaching at level of understanding  - Provide teaching via preferred learning methods  Outcome: Progressing  Goal: Verbalizes understanding of labor plan  Description: Assess patient/family/caregiver's baseline knowledge level and ability to understand information  Provide education via patient/family/caregiver's preferred learning method at appropriate level of understanding  1  Provide teaching at level of understanding  2  Provide teaching via preferred learning method(s)  Outcome: Progressing     Problem: DISCHARGE PLANNING  Goal: Discharge to home or other facility with appropriate resources  Description: INTERVENTIONS:  - Identify barriers to discharge w/patient and caregiver  - Arrange for needed discharge resources and transportation as appropriate  - Identify discharge learning needs (meds, wound care, etc )  - Arrange for interpretive services to assist at discharge as needed  - Refer to Case Management Department for coordinating discharge planning if the patient needs post-hospital services based on physician/advanced practitioner order or complex needs related to functional status, cognitive ability, or social support system  Outcome: Progressing     Problem: Labor & Delivery  Goal: Manages discomfort  Description: Assess and monitor for signs and symptoms of discomfort  Assess patient's pain level regularly and per hospital policy  Administer medications as ordered  Support use of nonpharmacological methods to help control pain such as distraction, imagery, relaxation, and application of heat and cold  Collaborate with interdisciplinary team and patient to determine appropriate pain management plan  1  Include patient in decisions related to comfort  2  Offer non-pharmacological pain management interventions  3  Report ineffective pain management to physician  Outcome: Progressing  Goal: Patient vital signs are stable  Description: 1  Assess vital signs - vaginal delivery    Outcome: Progressing

## 2020-12-29 NOTE — OB LABOR/OXYTOCIN SAFETY PROGRESS
Labor Progress Note - Mirtha Patel 41 y o  female MRN: [id number]    Unit/Bed#: -01 Encounter: [id number]    Dose (kelley-units/min) Oxytocin: 12 kelley-units/min  Contraction Frequency (minutes): 3  Contraction Quality: Moderate, Mild  Tachysystole: No   Cervical Dilation: 3-4        Cervical Effacement: 70  Fetal Station: -2  Baseline Rate: 130 bpm  Fetal Heart Rate: 136 BPM  FHR Category: Category I  Oxytocin Safety Progress Check: Safety check completed            Vital Signs:   Vitals:    12/29/20 0216   BP: 108/56   Pulse: 82   Resp:    Temp:            Notes/comments:   Pt comfortable, tolerating contractions on pitocin, sleeping  SVE unchanged at 3-4/70/-2, ctx q3 min, spacing a bit  Continue pitocin titration for now  Consider AROM at next check if contractions are closer together       Charlie Hernadez MD 12/29/2020 3:09 AM

## 2020-12-30 PROCEDURE — 99024 POSTOP FOLLOW-UP VISIT: CPT | Performed by: OBSTETRICS & GYNECOLOGY

## 2020-12-30 RX ADMIN — DOCUSATE SODIUM 100 MG: 100 CAPSULE, LIQUID FILLED ORAL at 17:17

## 2020-12-30 RX ADMIN — Medication: at 00:36

## 2020-12-30 RX ADMIN — SERTRALINE HYDROCHLORIDE 125 MG: 100 TABLET ORAL at 10:10

## 2020-12-30 RX ADMIN — MESALAMINE 500 MG: 250 CAPSULE ORAL at 21:17

## 2020-12-30 RX ADMIN — DOCUSATE SODIUM 100 MG: 100 CAPSULE, LIQUID FILLED ORAL at 10:09

## 2020-12-30 RX ADMIN — BUPRENORPHINE AND NALOXONE 1 FILM: 2; .5 FILM BUCCAL; SUBLINGUAL at 10:09

## 2020-12-30 RX ADMIN — MESALAMINE 500 MG: 250 CAPSULE ORAL at 10:08

## 2020-12-30 RX ADMIN — MESALAMINE 500 MG: 250 CAPSULE ORAL at 17:17

## 2020-12-30 RX ADMIN — IBUPROFEN 600 MG: 600 TABLET, FILM COATED ORAL at 00:36

## 2020-12-30 RX ADMIN — WITCH HAZEL 1 PAD: 500 SOLUTION RECTAL; TOPICAL at 00:36

## 2020-12-30 NOTE — L&D DELIVERY NOTE
DELIVERY NOTE  Mirtha Patel 41 y o  female MRN: [id number]  Unit/Bed#: -01 Encounter: [id number]    Obstetrician:   Marin Naqvi MD      Pre-Delivery Diagnosis: Term pregnancy  Induced labor  Single fetus    Post-Delivery Diagnosis: Same as above - Delivered  Viable male fetus    Procedure: Spontaneous vaginal delivery  Successful              Complications:  None    Description of Delivery:  Osei Milan is a 64-year-old white  6 para 65 female presenting to Labor and delivery on  at 38 weeks and 1 day for delivery  She had been seen in the  center where her antepartum fetal surveillance had been non-reassuring and she was recommended for delivery at that time  Upon initial evaluation she was approximately 1 cm dilated she had a Núñez catheter placed for cervical ripening and shortly thereafter Pitocin was begun  The Núñez catheter was expelled she was about 3-4 cm at that time  She made minimal change throughout the rest of the evening  The following morning she had Pitocin discontinued and was allowed to ambulate and eat  Pitocin was restarted  She underwent amniotomy requesting was given epidural analgesia with good relief  Made slow progress and had IUPC placed to help guide Pitocin titration  She had category 1 reassuring fetal heart tones throughout the labor process  She achieved complete cervical dilatation at 10:16 p m  On 2020 and began pushing at 10:58 p m  [place] Vanessa She delivered a viable male  via spontaneous assisted vaginal delivery at 11:02 p m  [place] Vanessa There was a nuchal cord x1 which was loose and was reduced after delivery of the head and prior to delivery of the shoulders  His Apgars were 6 at 1 minute and 8 at 5 minutes  Cord was allowed to cease pulsating prior to being clamped and cut  Cord blood and pH arterial and venous were obtained from the three-vessel cord  The placenta was expelled spontaneously intact at 11:05 p m     The uterus contracted well to massage and IV Pitocin  There were no lacerations hence no repair was indicated

## 2020-12-30 NOTE — OB LABOR/OXYTOCIN SAFETY PROGRESS
Labor Progress Note - Mritha Patel 41 y o  female MRN: [id number]    Unit/Bed#: -01 Encounter: [id number]    Dose (kelley-units/min) Oxytocin: 18 kelley-units/min  Contraction Frequency (minutes): 3-4  Contraction Quality: Moderate, Strong  Tachysystole: No   Cervical Dilation: 10  Dilation Complete Date: 12/29/20  Dilation Complete Time: 1022  Cervical Effacement: 100  Fetal Station: 1  Baseline Rate: 120 bpm  Fetal Heart Rate: 130 BPM  FHR Category: Category I  Oxytocin Safety Progress Check: Safety check completed            Vital Signs:   Vitals:    12/29/20 2219   BP: 96/54   Pulse: 77   Resp:    Temp:    SpO2:            Notes/comments:   Pt complete at this time  Dr Veronica Saavedra notified  Anticipate starting to push shortly       Refugio Braxton MD 12/29/2020 10:23 PM

## 2020-12-30 NOTE — LACTATION NOTE
This note was copied from a baby's chart  CONSULT - LACTATION  Baby Boy (Shima Tarsi 1 days male MRN: [id number]    [address] [place] [address] Room / Bed: (N)/(N) Encounter: [id number]    Maternal Information     MOTHER:  Valerie Ivy  Maternal Age: 41 y o    OB History: # 1 - Date: None, Sex: None, Weight: None, GA: None, Delivery: Therapeutic , Apgar1: None, Apgar5: None, Living: None, Birth Comments: surgical    # 2 - Date: None, Sex: None, Weight: None, GA: None, Delivery: Therapeutic , Apgar1: None, Apgar5: None, Living: None, Birth Comments: surgical    # 3 - Date: None, Sex: None, Weight: None, GA: None, Delivery: Therapeutic , Apgar1: None, Apgar5: None, Living: None, Birth Comments: surgical     # 4A - Date: 04, Sex: Female, Weight: 2637 g (5 lb 13 oz), GA: 37w0d, Delivery: Vaginal, Spontaneous, Apgar1: None, Apgar5: None, Living: Living, Birth Comments: None  # 4B - Date: 04, Sex: Male, Weight: 2608 g (5 lb 12 oz), GA: 37w0d, Delivery: , Low Transverse, Apgar1: None, Apgar5: None, Living: Living, Birth Comments: None    # 5 - Date: 12, Sex: Female, Weight: 3742 g (8 lb 4 oz), GA: 40w0d, Delivery: Vaginal, Spontaneous, Apgar1: None, Apgar5: None, Living: None, Birth Comments: None    # 6 - Date: 20, Sex: Male, Weight: 3265 g (7 lb 3 2 oz), GA: 38w2d, Delivery: Vaginal, Spontaneous, Apgar1: 6, Apgar5: 8, Living: Living, Birth Comments: None   Previouse breast reduction surgery? No    Lactation history:   Has patient previously breast fed: Yes   How long had patient previously breast fed: 2 months for her twins    Previous breast feeding complications:  Other (Comment)(inexperience, too busy with twins and pumping)     Past Surgical History:   Procedure Laterality Date     SECTION      LAST ASSESSED: [id number]    DENTAL SURGERY      INDUCED       SURIGCAL TREATMENT FOR     LIVER BIOPSY N/A     [place] ESOPHAGOGASTRODUODENOSCOPY TRANSORAL DIAGNOSTIC N/A 2016    Procedure: ESOPHAGOGASTRODUODENOSCOPY (EGD); Surgeon: George Mcdaniel MD;  Location: [facility]; Service: Gastroenterology        Birth information:  YOB: 2022   Time of birth: 11:02 PM   Sex: male   Delivery type: Vaginal, Spontaneous   Birth Weight: 3265 g (7 lb 3 2 oz)   Percent of Weight Change: 0%     Gestational Age: 36w4d   [unfilled]    Assessment     Breast and nipple assessment: normal assessment    [place] Assessment: normal assessment    Feeding assessment: feeding well  LATCH:  Latch: Grasps breast, tongue down, lips flanged, rhythmic sucking   Audible Swallowing: Spontaneous and intermittent (24 hours old)   Type of Nipple: Everted (After stimulation)   Comfort (Breast/Nipple): Soft/non-tender   Hold (Positioning): Partial assist, teach one side, mother does other, staff holds   [facility] Score: 9          Feeding recommendations:  breast feed on demand     Met with mother  Provided mother with Ready, Set, Baby booklet  Discussed Skin to Skin contact an benefits to mom and baby  Talked about the delay of the first bath until baby has adjusted  Spoke about the benefits of rooming in  Feeding on cue and what that means for recognizing infant's hunger  Avoidance of pacifiers for the first month discussed  Talked about exclusive breastfeeding for the first 6 months  Positioning and latch reviewed as well as showing images of other feeding positions  Discussed the properties of a good latch in any position  Reviewed hand/manual expression  Discussed s/s that baby is getting enough milk and some s/s that breastfeeding dyad may need further help  Gave information on common concerns, what to expect the first few weeks after delivery, preparing for other caregivers, and how partners can help  Resources for support also provided  Information on hand expression given   Discussed benefits of knowing how to manually express breast including stimulating milk supply, softening nipple for latch and evacuating breast in the event of engorgement  Discussed 2nd night syndrome and ways to calm infant  Hand out given  Worked on positioning infant up at chest level and starting to feed infant with nose arriving at the nipple  Then, using areolar compression to achieve a deep latch that is comfortable and exchanges optimum amounts of milk  Mom was initially not planning to breastfeed due to her medications  Reviewed Pentasa and Suboxone via Placido Chroman and Lactmed  Both meds are rated L-3  Discussed with Dr Zoila Frias  Based on these resources, breastfeeding is encouraged and supported  Mirtha expresses interested in putting baby to the breast  Enc Mirtha to monitor for blood in stool and/or excessive sleepiness and contact Dr Zoila Frias with any concerns outpatient  Enc her to call for lactation support as needed throughout her stay     Ralph Limon RN 12/30/2020 1:13 PM

## 2020-12-30 NOTE — CASE MANAGEMENT
Breast pump consult  Freedom of choice given  [facility] referral sent to [facility] for Spectra S2 pump for room delivery

## 2020-12-30 NOTE — CASE MANAGEMENT
Consult: Suboxone; MOB/baby UDS negative on admission  Cord blood sent  MOB delivered baby on 12/29/20  At the time of admission mother UDS was negative  Baby UDS negative  Baby cord blood sent  Per review of chart, MOB currently on suboxone with prior hx IV drug use  MOB reports being compliant on suboxone treatment  Hx anxiety/depression, taking medications managed by MD Dulce Bravo is a five day hold to monitor for BANDAR symptoms  [facility] report filed electronically e-Referral ID: [id number]  Copies made for MOB/baby charts  Anticipate follow up by [facility]  C&Y  SW following

## 2020-12-30 NOTE — PLAN OF CARE
Problem: PAIN - ADULT  Goal: Verbalizes/displays adequate comfort level or baseline comfort level  Description: Interventions:  - Encourage patient to monitor pain and request assistance  - Assess pain using appropriate pain scale  - Administer analgesics based on type and severity of pain and evaluate response  - Implement non-pharmacological measures as appropriate and evaluate response  - Consider cultural and social influences on pain and pain management  - Notify physician/advanced practitioner if interventions unsuccessful or patient reports new pain  12/30/2020 0054 by Chico Giraldo RN  Outcome: Progressing  12/29/2020 2103 by Chico Giraldo RN  Outcome: Progressing     Problem: INFECTION - ADULT  Goal: Absence or prevention of progression during hospitalization  Description: INTERVENTIONS:  - Assess and monitor for signs and symptoms of infection  - Monitor lab/diagnostic results  - Monitor all insertion sites, i e  indwelling lines, tubes, and drains  - Monitor endotracheal if appropriate and nasal secretions for changes in amount and color  - [place] appropriate cooling/warming therapies per order  - Administer medications as ordered  - Instruct and encourage patient and family to use good hand hygiene technique  - Identify and instruct in appropriate isolation precautions for identified infection/condition  12/30/2020 0054 by Chico Giraldo RN  Outcome: Progressing  12/29/2020 2103 by Chico Giraldo RN  Outcome: Progressing  Goal: Absence of fever/infection during neutropenic period  Description: INTERVENTIONS:  - Monitor WBC    12/30/2020 0054 by Chico Giraldo RN  Outcome: Progressing  12/29/2020 2103 by Chico Giraldo RN  Outcome: Progressing     Problem: SAFETY ADULT  Goal: Patient will remain free of falls  Description: INTERVENTIONS:  - Assess patient frequently for physical needs  -  Identify cognitive and physical deficits and behaviors that affect risk of falls    - [place] fall precautions as indicated by assessment   - Educate patient/family on patient safety including physical limitations  - Instruct patient to call for assistance with activity based on assessment  - Modify environment to reduce risk of injury  - Consider OT/PT consult to assist with strengthening/mobility  12/30/2020 0054 by Olive Mayes RN  Outcome: Progressing  12/29/2020 2103 by Olive Mayes RN  Outcome: Progressing  Goal: Maintain or return to baseline ADL function  Description: INTERVENTIONS:  -  Assess patient's ability to carry out ADLs; assess patient's baseline for ADL function and identify physical deficits which impact ability to perform ADLs (bathing, care of mouth/teeth, toileting, grooming, dressing, etc )  - Assess/evaluate cause of self-care deficits   - Assess range of motion  - Assess patient's mobility; develop plan if impaired  - Assess patient's need for assistive devices and provide as appropriate  - Encourage maximum independence but intervene and supervise when necessary  - Involve family in performance of ADLs  - Assess for home care needs following discharge   - Consider OT consult to assist with ADL evaluation and planning for discharge  - Provide patient education as appropriate  12/30/2020 0054 by Olive Mayes RN  Outcome: Progressing  12/29/2020 2103 by Olive Mayes RN  Outcome: Progressing  Goal: Maintain or return mobility status to optimal level  Description: INTERVENTIONS:  - Assess patient's baseline mobility status (ambulation, transfers, stairs, etc )    - Identify cognitive and physical deficits and behaviors that affect mobility  - Identify mobility aids required to assist with transfers and/or ambulation (gait belt, sit-to-stand, lift, walker, cane, etc )  - [place] fall precautions as indicated by assessment  - Record patient progress and toleration of activity level on Mobility SBAR; progress patient to next Phase/Stage  - Instruct patient to call for assistance with activity based on assessment  - Consider rehabilitation consult to assist with strengthening/weightbearing, etc   12/30/2020 0054 by Lan Urias RN  Outcome: Progressing  12/29/2020 2103 by Lan Urias RN  Outcome: Progressing     Problem: DISCHARGE PLANNING  Goal: Discharge to home or other facility with appropriate resources  Description: INTERVENTIONS:  - Identify barriers to discharge w/patient and caregiver  - Arrange for needed discharge resources and transportation as appropriate  - Identify discharge learning needs (meds, wound care, etc )  - Arrange for interpretive services to assist at discharge as needed  - Refer to Case Management Department for coordinating discharge planning if the patient needs post-hospital services based on physician/advanced practitioner order or complex needs related to functional status, cognitive ability, or social support system  12/30/2020 0054 by Lan Urias RN  Outcome: Progressing  12/29/2020 2103 by Lan Urias RN  Outcome: Progressing     Problem: Potential for Falls  Goal: Patient will remain free of falls  Description: INTERVENTIONS:  - Assess patient frequently for physical needs  -  Identify cognitive and physical deficits and behaviors that affect risk of falls    -  [place] fall precautions as indicated by assessment   - Educate patient/family on patient safety including physical limitations  - Instruct patient to call for assistance with activity based on assessment  - Modify environment to reduce risk of injury  - Consider OT/PT consult to assist with strengthening/mobility  12/30/2020 0054 by Lan Urias RN  Outcome: Progressing  12/29/2020 2103 by Lan Urias RN  Outcome: Progressing     Problem: POSTPARTUM  Goal: Experiences normal postpartum course  Description: INTERVENTIONS:  - Monitor maternal vital signs  - Assess uterine involution and lochia  Outcome: Progressing  Goal: Appropriate maternal -  bonding  Description: INTERVENTIONS:  - Identify family support  - Assess for appropriate maternal/infant bonding   -Encourage maternal/infant bonding opportunities  - Referral to  or  as needed  Outcome: Progressing  Goal: Establishment of infant feeding pattern  Description: INTERVENTIONS:  - Assess breast/bottle feeding  - Refer to lactation as needed  Outcome: Progressing  Goal: Incision(s), wounds(s) or drain site(s) healing without S/S of infection  Description: INTERVENTIONS  - Assess and document risk factors for skin impairment   - Assess and document dressing, incision, wound bed, drain sites and surrounding tissue  - Consider nutrition services referral as needed  - Oral mucous membranes remain intact  - Provide patient/ family education  Outcome: Progressing     Problem: Knowledge Deficit  Goal: Patient/family/caregiver demonstrates understanding of disease process, treatment plan, medications, and discharge instructions  Description: Complete learning assessment and assess knowledge base  Interventions:  - Provide teaching at level of understanding  - Provide teaching via preferred learning methods  2020 by Brittni Haynes RN  Outcome: Completed  2020 by Brittni Haynes RN  Outcome: Progressing  Goal: Verbalizes understanding of labor plan  Description: Assess patient/family/caregiver's baseline knowledge level and ability to understand information  Provide education via patient/family/caregiver's preferred learning method at appropriate level of understanding  1  Provide teaching at level of understanding  2  Provide teaching via preferred learning method(s)  2020 by Brittni Haynes RN  Outcome: Completed  2020 by Brittni Haynes RN  Outcome: Progressing     Problem: Labor & Delivery  Goal: Manages discomfort  Description: Assess and monitor for signs and symptoms of discomfort  Assess patient's pain level regularly and per hospital policy  Administer medications as ordered  Support use of nonpharmacological methods to help control pain such as distraction, imagery, relaxation, and application of heat and cold  Collaborate with interdisciplinary team and patient to determine appropriate pain management plan  1  Include patient in decisions related to comfort  2  Offer non-pharmacological pain management interventions  3  Report ineffective pain management to physician  2020 by Russ Martinez RN  Outcome: Completed  2020 by uRss Martinez RN  Outcome: Progressing  Goal: Patient vital signs are stable  Description: 1  Assess vital signs - vaginal delivery    2020 by Russ Martinez RN  Outcome: Completed  2020 by Russ Martinez RN  Outcome: Progressing     Problem: BIRTH - VAGINAL/ SECTION  Goal: Fetal and maternal status remain reassuring during the birth process  Description: INTERVENTIONS:  - Monitor vital signs  - Monitor fetal heart rate  - Monitor uterine activity  - Monitor labor progression (vaginal delivery)  - DVT prophylaxis  - Antibiotic prophylaxis  2020 by Russ Martinez RN  Outcome: Completed  2020 by Russ Martinez RN  Outcome: Progressing  Goal: Emotionally satisfying birthing experience for mother/fetus  Description: Interventions:  - Assess, plan, implement and evaluate the nursing care given to the patient in labor  - Advocate the philosophy that each childbirth experience is a unique experience and support the family's chosen level of involvement and control during the labor process   - Actively participate in both the patient's and family's teaching of the birth process  - Consider cultural, Restorationism and age-specific factors and plan care for the patient in labor  2020 by Russ Martinez RN  Outcome: Completed  2020 by Russ Martinez RN  Outcome: Progressing

## 2020-12-30 NOTE — PROGRESS NOTES
Progress Note - OB/GYN   Mirtha Patel 41 y o  female MRN: [id number]  Unit/Bed#:  313-01 Encounter: [id number]    Assessment:   Post partum Day # 1 s/pVBAC, stable, baby in room  Term pregnancy   Single male viable fetus  Pregnancy complicated by :  1) history of heroin use on suboxone 2-0 5 mg film, patient in taking 1/3film day  2) history of hepatitis-C virus:  PCR negative  3) asthma on albuterol  4) history of Crohn disease on no mesalamine  5) history anxiety: on sertraline   6) history of previous  section:  Successful TOLAC    Plan:  Continue routine post partum care  Encourage ambulation  Encourage breastfeeding  Anticipate discharge tomorrow    Subjective/Objective   Chief Complaint:     Post delivery  Patient is doing well  Lochia WNL  Pain well controlled  Subjective:     Pain: yes, cramping, improved with meds  Tolerating PO: yes  Voiding: yes  Flatus: yes  BM: no  Ambulating: yes  Chest pain: no  Shortness of breath: no  Leg pain: no  Lochia: minimal    Objective:     Vitals: /53 (BP Location: Right arm)   Pulse 81   Temp 98 5 °F (36 9 °C) (Oral)   Resp 20   Ht 5' 9" (1 753 m)   Wt 88 9 kg (196 lb)   LMP 2020 (Approximate)   SpO2 95%   Breastfeeding Unknown   BMI 28 94 kg/m²       Intake/Output Summary (Last 24 hours) at 2020 0624  Last data filed at 2020 0304  Gross per 24 hour   Intake 4571 6 ml   Output 1855 ml   Net 2716 6 ml       Lab Results   Component Value Date    WBC 12 16 (H) 2020    HGB 11 8 2020    HCT 36 3 2020    MCV 93 2020     2020       Physical Exam:     Gen: AAOx3, NAD  CV: RRR  Lungs: CTA b/l  Abd: Soft, non-tender, non-distended, no rebound or guarding  Uterine fundus firm and non-tender, 1 cm below the umbilicus     Ext: Non tender    Joselito Connors MD  2020  6:24 AM

## 2020-12-30 NOTE — PLAN OF CARE
Problem: PAIN - ADULT  Goal: Verbalizes/displays adequate comfort level or baseline comfort level  Description: Interventions:  - Encourage patient to monitor pain and request assistance  - Assess pain using appropriate pain scale  - Administer analgesics based on type and severity of pain and evaluate response  - Implement non-pharmacological measures as appropriate and evaluate response  - Consider cultural and social influences on pain and pain management  - Notify physician/advanced practitioner if interventions unsuccessful or patient reports new pain  Outcome: Progressing     Problem: INFECTION - ADULT  Goal: Absence or prevention of progression during hospitalization  Description: INTERVENTIONS:  - Assess and monitor for signs and symptoms of infection  - Monitor lab/diagnostic results  - Monitor all insertion sites, i e  indwelling lines, tubes, and drains  - Monitor endotracheal if appropriate and nasal secretions for changes in amount and color  - [place] appropriate cooling/warming therapies per order  - Administer medications as ordered  - Instruct and encourage patient and family to use good hand hygiene technique  - Identify and instruct in appropriate isolation precautions for identified infection/condition  Outcome: Progressing  Goal: Absence of fever/infection during neutropenic period  Description: INTERVENTIONS:  - Monitor WBC    Outcome: Progressing     Problem: SAFETY ADULT  Goal: Patient will remain free of falls  Description: INTERVENTIONS:  - Assess patient frequently for physical needs  -  Identify cognitive and physical deficits and behaviors that affect risk of falls    -  [place] fall precautions as indicated by assessment   - Educate patient/family on patient safety including physical limitations  - Instruct patient to call for assistance with activity based on assessment  - Modify environment to reduce risk of injury  - Consider OT/PT consult to assist with strengthening/mobility  Outcome: Progressing  Goal: Maintain or return to baseline ADL function  Description: INTERVENTIONS:  -  Assess patient's ability to carry out ADLs; assess patient's baseline for ADL function and identify physical deficits which impact ability to perform ADLs (bathing, care of mouth/teeth, toileting, grooming, dressing, etc )  - Assess/evaluate cause of self-care deficits   - Assess range of motion  - Assess patient's mobility; develop plan if impaired  - Assess patient's need for assistive devices and provide as appropriate  - Encourage maximum independence but intervene and supervise when necessary  - Involve family in performance of ADLs  - Assess for home care needs following discharge   - Consider OT consult to assist with ADL evaluation and planning for discharge  - Provide patient education as appropriate  Outcome: Progressing  Goal: Maintain or return mobility status to optimal level  Description: INTERVENTIONS:  - Assess patient's baseline mobility status (ambulation, transfers, stairs, etc )    - Identify cognitive and physical deficits and behaviors that affect mobility  - Identify mobility aids required to assist with transfers and/or ambulation (gait belt, sit-to-stand, lift, walker, cane, etc )  - [place] fall precautions as indicated by assessment  - Record patient progress and toleration of activity level on Mobility SBAR; progress patient to next Phase/Stage  - Instruct patient to call for assistance with activity based on assessment  - Consider rehabilitation consult to assist with strengthening/weightbearing, etc   Outcome: Progressing     Problem: Knowledge Deficit  Goal: Patient/family/caregiver demonstrates understanding of disease process, treatment plan, medications, and discharge instructions  Description: Complete learning assessment and assess knowledge base    Interventions:  - Provide teaching at level of understanding  - Provide teaching via preferred learning methods  Outcome: Progressing  Goal: Verbalizes understanding of labor plan  Description: Assess patient/family/caregiver's baseline knowledge level and ability to understand information  Provide education via patient/family/caregiver's preferred learning method at appropriate level of understanding  1  Provide teaching at level of understanding  2  Provide teaching via preferred learning method(s)  Outcome: Progressing     Problem: DISCHARGE PLANNING  Goal: Discharge to home or other facility with appropriate resources  Description: INTERVENTIONS:  - Identify barriers to discharge w/patient and caregiver  - Arrange for needed discharge resources and transportation as appropriate  - Identify discharge learning needs (meds, wound care, etc )  - Arrange for interpretive services to assist at discharge as needed  - Refer to Case Management Department for coordinating discharge planning if the patient needs post-hospital services based on physician/advanced practitioner order or complex needs related to functional status, cognitive ability, or social support system  Outcome: Progressing     Problem: Labor & Delivery  Goal: Manages discomfort  Description: Assess and monitor for signs and symptoms of discomfort  Assess patient's pain level regularly and per hospital policy  Administer medications as ordered  Support use of nonpharmacological methods to help control pain such as distraction, imagery, relaxation, and application of heat and cold  Collaborate with interdisciplinary team and patient to determine appropriate pain management plan  1  Include patient in decisions related to comfort  2  Offer non-pharmacological pain management interventions  3  Report ineffective pain management to physician  Outcome: Progressing  Goal: Patient vital signs are stable  Description: 1  Assess vital signs - vaginal delivery    Outcome: Progressing     Problem: BIRTH - VAGINAL/ SECTION  Goal: Fetal and maternal status remain reassuring during the birth process  Description: INTERVENTIONS:  - Monitor vital signs  - Monitor fetal heart rate  - Monitor uterine activity  - Monitor labor progression (vaginal delivery)  - DVT prophylaxis  - Antibiotic prophylaxis  Outcome: Progressing  Goal: Emotionally satisfying birthing experience for mother/fetus  Description: Interventions:  - Assess, plan, implement and evaluate the nursing care given to the patient in labor  - Advocate the philosophy that each childbirth experience is a unique experience and support the family's chosen level of involvement and control during the labor process   - Actively participate in both the patient's and family's teaching of the birth process  - Consider cultural, Scientology and age-specific factors and plan care for the patient in labor  Outcome: Progressing

## 2020-12-30 NOTE — DISCHARGE SUMMARY
Discharge Summary - OB/GYN   Charo Patel 41 y o  female MRN: [id number]  Unit/Bed#: -01 Encounter: [id number]      Admission Date: 2020     Discharge Date: 2020    Admitting Diagnosis:   1  Pregnancy at 38w2d  2  Induction of labor  3  Non-reactive NST, BPP 2/10  4  Prior  section x1  5  Advanced maternal age  10  Crohn's disease  7  Maternal hepatitis C  8  Tobacco use    Discharge Diagnosis:   Same, delivered    Procedures: vaginal birth after  ()    Delivery Attending: Alirio Delvalle MD  Discharge Attending: [facility] Course: Mirtha Patel is a 41 y o  [id number] at 38w2d wks who was initially admitted for non-reactive tracing and /10 BPP in office  Her pregnancy was complicated by the diagnoses listed above  She was started on [place] balloon and pitocin for induction of labor  She was given a break with pitocin before restarting  She eventually progressed to complete dilation with no concerns on FHT  She delivered a viable male  on 2020 at 2302  Weight 7lbs 3 oz via vaginal birth after  ()  Apgars were 6 (1 min) and 8 (5 min)   was transferred to  nursery  Patient tolerated the procedure well and was transferred to recovery in stable condition  Her postpartum course was complicated by no problems  Her postpartum pain was well controlled with oral analgesics  On day of discharge, she was ambulating and able to reasonably perform all ADLs  She was voiding and had appropriate bowel function  Pain was well controlled  She was discharged home on post-partum day #2 without complications  Patient was instructed to follow up with her OB as an outpatient and was given appropriate warnings to call provider if she develops signs of infection or uncontrolled pain      Complications: none apparent    Condition at discharge: good     Discharge instructions/Information to patient and family:   - Do not place anything (no partner, tampons or douche) in your vagina for 6 weeks  -You may walk for exercise for the first 6 weeks then gradually return to your usual activities    -Please do not drive for 1 week if you have no stitches and for 2 weeks if you have stitches or underwent a  delivery     -You may take baths or shower per your preference    -Please look at your bust (breasts) in the mirror daily and call for redness or tenderness or increased warmth    -Please call us for temperature > 100 4*F or 38* C, worsening pain or a foul discharge  Discharge Medications:   Prenatal vitamin daily for 6 months or the duration of nursing whichever is longer  Motrin 600 mg orally every 6 hours as needed for pain  Tylenol (over the counter) per bottle directions as needed for pain: do NOT use with percocet  Hydrocortisone cream 1% (over the counter) applied 1-2x daily to hemorrhoids as needed    Planned Readmission: No      Case and note reviewed agree

## 2020-12-31 VITALS
TEMPERATURE: 98.4 F | HEART RATE: 72 BPM | DIASTOLIC BLOOD PRESSURE: 54 MMHG | SYSTOLIC BLOOD PRESSURE: 100 MMHG | WEIGHT: 196 LBS | RESPIRATION RATE: 20 BRPM | OXYGEN SATURATION: 96 % | BODY MASS INDEX: 29.03 KG/M2 | HEIGHT: 69 IN

## 2020-12-31 PROCEDURE — 99024 POSTOP FOLLOW-UP VISIT: CPT | Performed by: OBSTETRICS & GYNECOLOGY

## 2020-12-31 RX ORDER — DIAPER,BRIEF,INFANT-TODD,DISP
1 EACH MISCELLANEOUS 4 TIMES DAILY PRN
Qty: 30 G | Refills: 0
Start: 2020-12-31 | End: 2021-01-15 | Stop reason: ALTCHOICE

## 2020-12-31 RX ORDER — ACETAMINOPHEN 325 MG/1
650 TABLET ORAL EVERY 4 HOURS PRN
Refills: 0
Start: 2020-12-31 | End: 2021-03-12 | Stop reason: ALTCHOICE

## 2020-12-31 RX ORDER — IBUPROFEN 600 MG/1
600 TABLET ORAL EVERY 6 HOURS PRN
Qty: 30 TABLET | Refills: 0
Start: 2020-12-31 | End: 2021-10-25 | Stop reason: ALTCHOICE

## 2020-12-31 RX ADMIN — MESALAMINE 500 MG: 250 CAPSULE ORAL at 09:01

## 2020-12-31 RX ADMIN — IBUPROFEN 600 MG: 600 TABLET, FILM COATED ORAL at 18:53

## 2020-12-31 RX ADMIN — MESALAMINE 500 MG: 250 CAPSULE ORAL at 18:54

## 2020-12-31 RX ADMIN — BUPRENORPHINE AND NALOXONE 0.3 FILM: 2; .5 FILM BUCCAL; SUBLINGUAL at 09:02

## 2020-12-31 RX ADMIN — DOCUSATE SODIUM 100 MG: 100 CAPSULE, LIQUID FILLED ORAL at 09:01

## 2020-12-31 RX ADMIN — DOCUSATE SODIUM 100 MG: 100 CAPSULE, LIQUID FILLED ORAL at 18:53

## 2020-12-31 RX ADMIN — SERTRALINE HYDROCHLORIDE 150 MG: 100 TABLET ORAL at 09:01

## 2020-12-31 NOTE — PLAN OF CARE
Problem: PAIN - ADULT  Goal: Verbalizes/displays adequate comfort level or baseline comfort level  Description: Interventions:  - Encourage patient to monitor pain and request assistance  - Assess pain using appropriate pain scale  - Administer analgesics based on type and severity of pain and evaluate response  - Implement non-pharmacological measures as appropriate and evaluate response  - Consider cultural and social influences on pain and pain management  - Notify physician/advanced practitioner if interventions unsuccessful or patient reports new pain  Outcome: Progressing     Problem: INFECTION - ADULT  Goal: Absence or prevention of progression during hospitalization  Description: INTERVENTIONS:  - Assess and monitor for signs and symptoms of infection  - Monitor lab/diagnostic results  - Monitor all insertion sites, i e  indwelling lines, tubes, and drains  - Monitor endotracheal if appropriate and nasal secretions for changes in amount and color  - [place] appropriate cooling/warming therapies per order  - Administer medications as ordered  - Instruct and encourage patient and family to use good hand hygiene technique  - Identify and instruct in appropriate isolation precautions for identified infection/condition  Outcome: Progressing  Goal: Absence of fever/infection during neutropenic period  Description: INTERVENTIONS:  - Monitor WBC    Outcome: Progressing     Problem: SAFETY ADULT  Goal: Patient will remain free of falls  Description: INTERVENTIONS:  - Assess patient frequently for physical needs  -  Identify cognitive and physical deficits and behaviors that affect risk of falls    -  [place] fall precautions as indicated by assessment   - Educate patient/family on patient safety including physical limitations  - Instruct patient to call for assistance with activity based on assessment  - Modify environment to reduce risk of injury  - Consider OT/PT consult to assist with strengthening/mobility  Outcome: Progressing  Goal: Maintain or return to baseline ADL function  Description: INTERVENTIONS:  -  Assess patient's ability to carry out ADLs; assess patient's baseline for ADL function and identify physical deficits which impact ability to perform ADLs (bathing, care of mouth/teeth, toileting, grooming, dressing, etc )  - Assess/evaluate cause of self-care deficits   - Assess range of motion  - Assess patient's mobility; develop plan if impaired  - Assess patient's need for assistive devices and provide as appropriate  - Encourage maximum independence but intervene and supervise when necessary  - Involve family in performance of ADLs  - Assess for home care needs following discharge   - Consider OT consult to assist with ADL evaluation and planning for discharge  - Provide patient education as appropriate  Outcome: Progressing  Goal: Maintain or return mobility status to optimal level  Description: INTERVENTIONS:  - Assess patient's baseline mobility status (ambulation, transfers, stairs, etc )    - Identify cognitive and physical deficits and behaviors that affect mobility  - Identify mobility aids required to assist with transfers and/or ambulation (gait belt, sit-to-stand, lift, walker, cane, etc )  - [place] fall precautions as indicated by assessment  - Record patient progress and toleration of activity level on Mobility SBAR; progress patient to next Phase/Stage  - Instruct patient to call for assistance with activity based on assessment  - Consider rehabilitation consult to assist with strengthening/weightbearing, etc   Outcome: Progressing     Problem: DISCHARGE PLANNING  Goal: Discharge to home or other facility with appropriate resources  Description: INTERVENTIONS:  - Identify barriers to discharge w/patient and caregiver  - Arrange for needed discharge resources and transportation as appropriate  - Identify discharge learning needs (meds, wound care, etc )  - Arrange for interpretive services to assist at discharge as needed  - Refer to Case Management Department for coordinating discharge planning if the patient needs post-hospital services based on physician/advanced practitioner order or complex needs related to functional status, cognitive ability, or social support system  Outcome: Progressing     Problem: Potential for Falls  Goal: Patient will remain free of falls  Description: INTERVENTIONS:  - Assess patient frequently for physical needs  -  Identify cognitive and physical deficits and behaviors that affect risk of falls    -  [place] fall precautions as indicated by assessment   - Educate patient/family on patient safety including physical limitations  - Instruct patient to call for assistance with activity based on assessment  - Modify environment to reduce risk of injury  - Consider OT/PT consult to assist with strengthening/mobility  Outcome: Progressing     Problem: POSTPARTUM  Goal: Experiences normal postpartum course  Description: INTERVENTIONS:  - Monitor maternal vital signs  - Assess uterine involution and lochia  Outcome: Progressing  Goal: Appropriate maternal -  bonding  Description: INTERVENTIONS:  - Identify family support  - Assess for appropriate maternal/infant bonding   -Encourage maternal/infant bonding opportunities  - Referral to  or  as needed  Outcome: Progressing  Goal: Establishment of infant feeding pattern  Description: INTERVENTIONS:  - Assess breast/bottle feeding  - Refer to lactation as needed  Outcome: Progressing  Goal: Incision(s), wounds(s) or drain site(s) healing without S/S of infection  Description: INTERVENTIONS  - Assess and document risk factors for skin impairment   - Assess and document dressing, incision, wound bed, drain sites and surrounding tissue  - Consider nutrition services referral as needed  - Oral mucous membranes remain intact  - Provide patient/ family education  Outcome: Progressing     Problem: Prexisting or High Potential for Compromised Skin Integrity  Goal: Skin integrity is maintained or improved  Description: INTERVENTIONS:  - Identify patients at risk for skin breakdown  - Assess and monitor skin integrity  - Assess and monitor nutrition and hydration status  - Monitor labs   - Assess for incontinence   - Turn and reposition patient  - Assist with mobility/ambulation  - Relieve pressure over bony prominences  - Avoid friction and shearing  - Provide appropriate hygiene as needed including keeping skin clean and dry  - Evaluate need for skin moisturizer/barrier cream  - Collaborate with interdisciplinary team   - Patient/family teaching  - Consider wound care consult   Outcome: Progressing

## 2020-12-31 NOTE — LACTATION NOTE
This note was copied from a baby's chart  CONSULT - LACTATION  Baby Boy (Shima Tarsi 2 days male MRN: [id number]    [address] [place] [address] Room / Bed: (N)/(N) Encounter: [id number]    Maternal Information     MOTHER:  Rm Macias  Maternal Age: 41 y o    OB History: # 1 - Date: None, Sex: None, Weight: None, GA: None, Delivery: Therapeutic , Apgar1: None, Apgar5: None, Living: None, Birth Comments: surgical    # 2 - Date: None, Sex: None, Weight: None, GA: None, Delivery: Therapeutic , Apgar1: None, Apgar5: None, Living: None, Birth Comments: surgical    # 3 - Date: None, Sex: None, Weight: None, GA: None, Delivery: Therapeutic , Apgar1: None, Apgar5: None, Living: None, Birth Comments: surgical     # 4A - Date: 04, Sex: Female, Weight: 2637 g (5 lb 13 oz), GA: 37w0d, Delivery: Vaginal, Spontaneous, Apgar1: None, Apgar5: None, Living: Living, Birth Comments: None  # 4B - Date: 04, Sex: Male, Weight: 2608 g (5 lb 12 oz), GA: 37w0d, Delivery: , Low Transverse, Apgar1: None, Apgar5: None, Living: Living, Birth Comments: None    # 5 - Date: 12, Sex: Female, Weight: 3742 g (8 lb 4 oz), GA: 40w0d, Delivery: Vaginal, Spontaneous, Apgar1: None, Apgar5: None, Living: None, Birth Comments: None    # 6 - Date: 20, Sex: Male, Weight: 3265 g (7 lb 3 2 oz), GA: 38w2d, Delivery: Vaginal, Spontaneous, Apgar1: 6, Apgar5: 8, Living: Living, Birth Comments: None   Previouse breast reduction surgery? No    Lactation history:   Has patient previously breast fed: Yes   How long had patient previously breast fed: 2 months for her twins    Previous breast feeding complications:  Other (Comment)(inexperience, too busy with twins and pumping)     Past Surgical History:   Procedure Laterality Date     SECTION      LAST ASSESSED: [id number]    DENTAL SURGERY      INDUCED       SURIGCAL TREATMENT FOR     LIVER BIOPSY N/A     GA ESOPHAGOGASTRODUODENOSCOPY TRANSORAL DIAGNOSTIC N/A 2016    Procedure: ESOPHAGOGASTRODUODENOSCOPY (EGD); Surgeon: Karen Luis MD;  Location: [facility]; Service: Gastroenterology        Birth information:  YOB: 2022   Time of birth: 11:02 PM   Sex: male   Delivery type: Vaginal, Spontaneous   Birth Weight: 3265 g (7 lb 3 2 oz)   Percent of Weight Change: -8%     Gestational Age: 36w4d   [unfilled]    Assessment     Breast and nipple assessment: normal assessment    [place] Assessment: normal assessment    Feeding assessment: feeding well  LATCH:  Latch: Grasps breast, tongue down, lips flanged, rhythmic sucking   Audible Swallowing: Spontaneous and intermittent (24 hours old)   Type of Nipple: Everted (After stimulation)   Comfort (Breast/Nipple): Soft/non-tender   Hold (Positioning): Partial assist, teach one side, mother does other, staff holds   [facility] Score: 9          Feeding recommendations:  breast feed on demand     Spectra S2 delivered to room today during lactation assessment  Discussed pumping for infant while at home including cycling breast pump  Provided several 2 ounce bottles as infant is to stay inpatient until day 5 of life  Infant is at 8 % weight loss  Hand expression very effective  Worked on positioning and latch today  Mirtha appeared much more confident after demonstration on how to latch infant to breast     Discussed tobacco use and caffeine  Worked on positioning infant up at chest level and starting to feed infant with nose arriving at the nipple  Then, using areolar compression to achieve a deep latch that is comfortable and exchanges optimum amounts of milk  Met with mother to go over discharge breastfeeding booklet including the feeding log   Emphasized 8 or more (12) feedings in a 24 hour period, what to expect for the number of diapers per day of life and the progression of properties of the  stooling pattern  Reviewed breastfeeding and your lifestyle, storage and preparation of breast milk, how to keep you breast pump clean, the employed breastfeeding mother and paced bottle feeding handouts  Booklet included Breastfeeding Resources for after discharge including access to the number for the [address]  Discussed s/s engorgement, blocked milk ducts, and mastitis  Discussed how to remedy at home and when to contact physician  Encouraged parents to call for assistance, questions, and concerns about breastfeeding  Extension provided      Chirag Garcias RN 12/31/2020 5:25 PM

## 2020-12-31 NOTE — CASE MANAGEMENT
Patient approved for Spectra pump  CM delivered pump bedside, receipt signed  Yellow copy provided to patient  Original scanned to [facility] via [employer]

## 2020-12-31 NOTE — PROGRESS NOTES
Progress Note - OB/GYN  Post-Partum Physician Note   Carlos A Patel 41 y o  female MRN: [id number]  Unit/Bed#: -01 Encounter: [id number]    Subjective/Objective   Chief Complaint: Postpartum    Subjective: Pain is controlled with current analgesics  Medications being used: ibuprofen (OTC)  Eating a regular diet without difficulty  Flatus Yes   Bowel movements are not yet  Voiding without difficulty  Ambulating Yes  Breastfeeding No Lochia Lochia  Lochia Color: Rubra  Amount: Minimal  Lochia Odor: None  Clots: None normal     Objective:    Vitals: Blood pressure 105/56, pulse 74, temperature 98 2 °F (36 8 °C), temperature source Oral, resp  rate 18, height 5' 9" (1 753 m), weight 88 9 kg (196 lb), last menstrual period 04/12/2020, SpO2 96 %, currently breastfeeding  No intake or output data in the 24 hours ending 12/31/20 0538    Physical Exam:  GEN: Mirtha Patel appears well, alert and oriented x 3, pleasant and cooperative    HEART: regular rhythm, normal S1 and S2, no murmurs, clicks, gallops or rubs   LUNGS: clear to auscultation bilaterally; no wheezes, rales, or rhonchi   ABDOMEN: normal bowel sounds, soft, no tenderness, no distention  : Uterus firm at umbilicus nontender   EXTREMITIES: peripheral pulses normal; no clubbing, cyanosis, or edema      Labs: No results found for this or any previous visit (from the past 24 hour(s))          MEDS:   Current Facility-Administered Medications   Medication Dose Route Frequency    acetaminophen (TYLENOL) tablet 650 mg  650 mg Oral Q4H PRN    aluminum-magnesium hydroxide-simethicone (MYLANTA) oral suspension 15 mL  15 mL Oral Q6H PRN    benzocaine-menthol-lanolin-aloe (DERMOPLAST) 20-0 5 % topical spray   Topical 4x Daily PRN    buprenorphine-naloxone (SUBOXONE) 2-0 5 mg per SL film 1 Film  1 Film Sublingual Daily    calcium carbonate (TUMS) chewable tablet 1,000 mg  1,000 mg Oral Daily PRN    diphenhydrAMINE (BENADRYL) injection 25 mg  25 mg Intravenous Q6H PRN    docusate sodium (COLACE) capsule 100 mg  100 mg Oral BID    hydrocortisone 1 % cream 1 application  1 application Topical 4x Daily PRN    ibuprofen (MOTRIN) tablet 600 mg  600 mg Oral Q6H PRN    lactated ringers infusion  125 mL/hr Intravenous Continuous    mesalamine (PENTASA) ER capsule 500 mg  500 mg Oral TID    ondansetron (ZOFRAN) injection 4 mg  4 mg Intravenous Q4H PRN    oxytocin (PITOCIN) 30 Units in lactated ringers 500 mL infusion  1-30 k[place]-units/min Intravenous Titrated    ropivacaine 0 2% PCEA   Epidural Continuous    senna (SENOKOT) tablet 8 6 mg  1 tablet Oral Daily    sertraline (ZOLOFT) tablet 150 mg  150 mg Oral Daily    simethicone (MYLICON) chewable tablet 80 mg  80 mg Oral 4x Daily PRN    witch hazel-glycerin (TUCKS) topical pad 1 pad  1 pad Topical Q2H PRN     Invasive Devices     None                   Assessment/Plan     Assessment:  Patient Active Problem List   Diagnosis    Depression    Hemorrhoid    History of heroin abuse (HCC)    Renal cyst    History of hepatitis C    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, intellectual disability, and dysmorphic features ([facility] [address] [address] )    Crohn's disease without complication (HCC)    Cigarette nicotine dependence without complication    Multigravida of advanced maternal age in third trimester    Tobacco smoking affecting pregnancy, antepartum    History of  section    Previous child with congenital anomaly, currently pregnant, antepartum    Dermatitis    38 weeks gestation of pregnancy    Psychosocial stressors    Anxiety in pregnancy in third trimester, antepartum    Depression affecting pregnancy in third trimester, antepartum    Pregnancy    Suboxone maintenance treatment complicating pregnancy, antepartum, third trimester ([address] )    Vaginal birth after      (spontaneous vaginal delivery)       Plan:  1) Postpartum day #  2 status post successful    2) Continue routine postpartum care  3)  Baby requires continued monitoring, patient plans to look into night watch options  Patient will be discharged, instructions given      Anay Gaffney MD  2020  5:38 AM

## 2021-01-04 NOTE — UTILIZATION REVIEW
Notification of Maternity/Delivery & [place] Birth Information for Admission   Notification of Maternity/Delivery for Admission to our facility [facility]  Be advised that this patient was admitted to our facility under Inpatient Status  Contact Marie Puente at [phone] for additional admission information  Arlene Damon [facility]ILD [facility] DEPT DEDICATED [place] [phone]  Mother &  Information   Patient Name: Sari Patel   YOB: 1980   Delivering clinician: [facility]   OB History        6    Para   3    Term   3            AB   3    Living   4       SAB        TAB   3    Ectopic        Multiple   1    Live Births   4           Obstetric Comments   ABNORMAL MENSES              Name & MRN:   Information for the patient's :  Keily Casillas [[id number]]      Delivery Information:  Sex: male  Delivered 2020 11:02 PM by Vaginal, Spontaneous; Gestational Age: 36w4d    [place] Measurements:  Weight: 7 lb 3 2 oz (3265 g); Height: 19"    APGAR 1 minute 5 minutes 10 minutes   Totals: 6 8      [place] Birth Information: 41 y o  female MRN: [id number] Unit/Bed#: -01 Estimated Date of Delivery: 1/10/21  Birthweight: No birth weight on file   Gestational Age: <None> Delivery Type: Vaginal, Spontaneous      Authorization Information   Servicing Facility:   [place] [address]  Tax ID: [id number]  NPI: [id number] Attending Provider/NPI:  Phone:  Address: Fantasma Burleson [[id number]]  [phone]  Same as [place]/Dinorah Hou 1106 of Service Code:  24 Place of Service Name: [address] [place] [address]   Start Date: 20   Discharge Date & Time: 2020  9:00 PM    Type of Admission: Inpatient Status Discharge Disposition (if discharged): Home/Self Care   Patient Diagnoses:   38 weeks gestation of pregnancy [Z3A 38]  Encounter for full-term uncomplicated delivery [U84]  The primary encounter diagnosis was 38 weeks gestation of pregnancy  Diagnoses of  (spontaneous vaginal delivery) and Vaginal birth after  were also pertinent to this visit  1  38 weeks gestation of pregnancy    2   (spontaneous vaginal delivery)    3  Vaginal birth after        Orders: Admission Orders (From admission, onward)     Ordered        20 1702  Inpatient Admission  Once                    Assigned Utilization Review Contact: Jeffrey Capps  Utilization   Network Utilization Review Department  Phone: [phone]; Fax [phone]  Email: [email or web address]

## 2021-01-06 LAB — PLACENTA IN STORAGE: NORMAL

## 2021-01-07 ENCOUNTER — TELEMEDICINE (OUTPATIENT)
Dept: FAMILY MEDICINE CLINIC | Facility: CLINIC | Age: 41
End: 2021-01-07
Payer: COMMERCIAL

## 2021-01-07 VITALS — HEIGHT: 69 IN | TEMPERATURE: 96.8 F | BODY MASS INDEX: 29.03 KG/M2 | WEIGHT: 196 LBS

## 2021-01-07 DIAGNOSIS — Z20.822 CLOSE EXPOSURE TO COVID-19 VIRUS: Primary | ICD-10-CM

## 2021-01-07 PROCEDURE — 99213 OFFICE O/P EST LOW 20 MIN: CPT | Performed by: PHYSICIAN ASSISTANT

## 2021-01-07 NOTE — PROGRESS NOTES
Virtual Regular Visit      Assessment/Plan:    Problem List Items Addressed This Visit        Other    Close exposure to COVID-19 virus - Primary    Relevant Orders    Novel Coronavirus (COVID-19), PCR [facility] - Collected at [facility] or [facility] Now         I did advise her to call if she does develop any symptoms  - I did recommend she go for a COVID-19 test tomorrow or Saturday  - she will monitor her temperature along with her     M*Modal software was used to dictate this note  It may contain errors with dictating incorrect words/spelling  Please contact provider directly for any questions  Reason for visit is   Chief Complaint   Patient presents with    contact with family member who tested positive for covid    Virtual Regular Visit        Encounter provider Raudel Busby PA-C    Provider located at [address] [place] [address] [id number]  [address] [facility],[address]   [address]  [facility] [id number]      Recent Visits  No visits were found meeting these conditions  Showing recent visits within past 7 days and meeting all other requirements     Today's Visits  Date Type Provider Dept   21 Telemedicine Roxana Santoyo PA-C [facility]   Showing today's visits and meeting all other requirements     Future Appointments  No visits were found meeting these conditions  Showing future appointments within next 150 days and meeting all other requirements        The patient was identified by name and date of birth  Mirtha Patel was informed that this is a telemedicine visit and that the visit is being conducted through [employer] and patient was informed that this is not a secure, HIPAA-compliant platform  She agrees to proceed     My office door was closed  No one else was in the room  She acknowledged consent and understanding of privacy and security of the video platform   The patient has agreed to participate and understands they can discontinue the visit at any time  Patient is aware this is a billable service  Subjective  Mirtha Patel is a 41 y o  female virtual visit for covid exposure   Patient presents today for virtual visit because she was exposed to her brother on several occasions on  in  who was having significant fatigue which she thought was related to his kidneys but then he got tested for COVID-19 which came back positive  She recently gave birth to a healthy baby boy  She states that no one in the household is having any symptoms including herself  She denies fever, chills, cough, congestion, sore throat, shortness of breath, loss of sense of smell or taste, nausea, vomiting, diarrhea  Her older 3 children have tested negative so far  She just spoke to  Her infants pediatrician today  Past Medical History:   Diagnosis Date    Arthritis     shoulders    Cro[facility] disease ([facility] )     Depression     [facility]     Ganglion cyst of wrist, right     LAST ASSESSED: [id number]    Hepatitis C     dx 15 yrs ago-2015 retested told undetectable    Hepatitis C virus infection     LAST ASSESSED: [id number]    Hordeolum externum left eye, unspecified eyelid     LAST ASSESSED: [id number]    Pyelonephritis     pyelonephritis    Shingles     Stargardt's disease     Stargardts Disease     dx when 23years old    Tear of right scapholunate ligament     LAST ASSESSED: [id number]    Varicella     as child    Wears glasses     reading       Past Surgical History:   Procedure Laterality Date     SECTION      LAST ASSESSED: [id number]    DENTAL SURGERY      INDUCED       SURIGCAL TREATMENT FOR     LIVER BIOPSY N/A     [place] ESOPHAGOGASTRODUODENOSCOPY TRANSORAL DIAGNOSTIC N/A 2016    Procedure: ESOPHAGOGASTRODUODENOSCOPY (EGD); Surgeon: Nohemy Gray MD;  Location: [facility];   Service: Gastroenterology       Current Outpatient Medications   Medication Sig Dispense Refill    albuterol (Ventolin HFA) 90 mcg/act inhaler Inhale 2 puffs every 6 (six) hours as needed for wheezing or shortness of breath 18 g 0    buprenorphine-naloxone (SUBOXONE) 2-0 5 mg Patient is taking 1/3 film daily  6 Film 0    ibuprofen (MOTRIN) 600 mg tablet Take 1 tablet (600 mg total) by mouth every 6 (six) hours as needed for moderate pain (cramping) 30 tablet 0    mesalamine (PENTASA) 500 mg CR capsule Take 1 capsule (500 mg total) by mouth 3 (three) times a day 270 capsule 3    Prenatal Vit-Fe Fumarate-FA (PRENATAL 1+1 PO) Take by mouth      sertraline (ZOLOFT) 100 mg tablet Take 1 tablet daily with 50 mg tablet  90 tablet 1    sertraline (ZOLOFT) 25 mg tablet Take 1 tablet daily with 100 mg tablet  90 tablet 1    acetaminophen (TYLENOL) 325 mg tablet Take 2 tablets (650 mg total) by mouth every 4 (four) hours as needed for mild pain (Patient not taking: Reported on 1/7/2021)  0    benzocaine-menthol-lanolin-aloe (DERMOPLAST) 20-0 5 % topical spray Apply 1 application topically 4 (four) times a day as needed (perineal pain) (Patient not taking: Reported on 1/7/2021)  0    hydrocortisone 1 % cream Apply 1 application topically 4 (four) times a day as needed for irritation (Patient not taking: Reported on 1/7/2021) 30 g 0    naloxone (NARCAN) 4 mg/0 1 mL nasal spray Administer 1 spray into a nostril  If no response after 2-3 minutes, give another dose in the other nostril using a new spray  (Patient not taking: Reported on 11/20/2020) 1 each 1     No current facility-administered medications for this visit  No Known Allergies    Review of Systems   Constitutional: Negative  HENT: Negative for congestion and sore throat  Respiratory: Negative for cough and shortness of breath  Gastrointestinal: Negative for abdominal pain, diarrhea, nausea and vomiting         Video Exam    Vitals:    01/07/21 1232   Temp: (!) 96 8 °F (36 °C)   TempSrc: Tympanic   Weight: 88 9 kg (196 lb)   Height: 5' 9" (1 753 m)       Physical Exam  Constitutional:       General: She is not in acute distress  Appearance: Normal appearance  She is not ill-appearing, toxic-appearing or diaphoretic  HENT:      Head: Normocephalic and atraumatic  Neck:      Musculoskeletal: Neck supple  Pulmonary:      Effort: Pulmonary effort is normal  No respiratory distress (  Patient is able to talk in full sentences without coughing or being short of breath)  Neurological:      General: No focal deficit present  Mental Status: She is alert  Psychiatric:         Mood and Affect: Mood normal           I spent 15 minutes directly with the patient during this visit      VIRTUAL VISIT DISCLAIMER    Mirtha Patel acknowledges that she has consented to an online visit or consultation  She understands that the online visit is based solely on information provided by her, and that, in the absence of a face-to-face physical evaluation by the physician, the diagnosis she receives is both limited and provisional in terms of accuracy and completeness  This is not intended to replace a full medical face-to-face evaluation by the physician  Mirtha Patel understands and accepts these terms

## 2021-01-08 ENCOUNTER — TELEPHONE (OUTPATIENT)
Dept: INTERNAL MEDICINE CLINIC | Facility: CLINIC | Age: 41
End: 2021-01-08

## 2021-01-08 NOTE — TELEPHONE ENCOUNTER
Spoke with the patient and explained that she would need to call and switch her insurance to us as her PCP  She will like to keep us as her PCP and suboxone patient as well  Gave her the name of the practice, Dr Francisco Mcduffie name, Address and NPI number when she calls to switch  I also stated to her to get the name of who she spoke to and when they are going to effect her with our practice  Stated that when she calls back to make the appointment to speak with Sandi Lennon or Dipti to make the appointment for her      She understood everything and wrote it all down as well

## 2021-01-08 NOTE — TELEPHONE ENCOUNTER
Patient was given Dr Shawna Ackerman name from her OBGYN due to patient being on Suboxone and was told that she can't get it from them anymore after she delivers her baby    She just delivered her baby on 12/29/2020 and needs to make an appointment with us to continue the suboxone with Dr Slo Washington    Not sure how the process works with this    Please call her back at the mobile phone    Also if she has the gateway medicare assured insurance she would need to switch to us as the pcp for us to take care of her

## 2021-01-08 NOTE — TELEPHONE ENCOUNTER
Please let patient know she will need to update PCP with insurance to Dr Diego Odonnell and establish with us for future primary care  Also inform patient to contact office once updated so we can set up appointment for her  Dr Diego Odonnell will take on patients for suboxone if they are referred by maternity

## 2021-01-09 DIAGNOSIS — Z20.822 CLOSE EXPOSURE TO COVID-19 VIRUS: ICD-10-CM

## 2021-01-09 PROCEDURE — U0005 INFEC AGEN DETEC AMPLI PROBE: HCPCS | Performed by: PHYSICIAN ASSISTANT

## 2021-01-09 PROCEDURE — U0003 INFECTIOUS AGENT DETECTION BY NUCLEIC ACID (DNA OR RNA); SEVERE ACUTE RESPIRATORY SYNDROME CORONAVIRUS 2 (SARS-COV-2) (CORONAVIRUS DISEASE [COVID-19]), AMPLIFIED PROBE TECHNIQUE, MAKING USE OF HIGH THROUGHPUT TECHNOLOGIES AS DESCRIBED BY CMS-2020-01-R: HCPCS | Performed by: PHYSICIAN ASSISTANT

## 2021-01-10 LAB — SARS-COV-2 RNA SPEC QL NAA+PROBE: NOT DETECTED

## 2021-01-11 ENCOUNTER — TELEPHONE (OUTPATIENT)
Dept: FAMILY MEDICINE CLINIC | Facility: CLINIC | Age: 41
End: 2021-01-11

## 2021-01-11 NOTE — TELEPHONE ENCOUNTER
----- Message from Cintia Rivera PA-C sent at 1/11/2021  8:48 AM EST -----  Please let her know that her COVID test is negative

## 2021-01-11 NOTE — TELEPHONE ENCOUNTER
Patient called today  Stated insurance updated PCP to Dr Natasha Yuan as of 1/1/2021 and will be sending her a new card in the mail  Set up appointment for patient to establish with Dr Natasha Yuan 1/15/2021 and take over of Suboxone therapy

## 2021-01-15 ENCOUNTER — OFFICE VISIT (OUTPATIENT)
Dept: INTERNAL MEDICINE CLINIC | Facility: CLINIC | Age: 41
End: 2021-01-15
Payer: COMMERCIAL

## 2021-01-15 VITALS
TEMPERATURE: 97.7 F | WEIGHT: 182 LBS | HEART RATE: 90 BPM | OXYGEN SATURATION: 98 % | BODY MASS INDEX: 27.58 KG/M2 | HEIGHT: 68 IN | SYSTOLIC BLOOD PRESSURE: 132 MMHG | DIASTOLIC BLOOD PRESSURE: 88 MMHG

## 2021-01-15 DIAGNOSIS — F32.A DEPRESSION, UNSPECIFIED DEPRESSION TYPE: ICD-10-CM

## 2021-01-15 DIAGNOSIS — F11.20 SUBOXONE MAINTENANCE TREATMENT COMPLICATING PREGNANCY, ANTEPARTUM, THIRD TRIMESTER (HCC): ICD-10-CM

## 2021-01-15 DIAGNOSIS — M54.2 NECK PAIN: ICD-10-CM

## 2021-01-15 DIAGNOSIS — O99.323 SUBOXONE MAINTENANCE TREATMENT COMPLICATING PREGNANCY, ANTEPARTUM, THIRD TRIMESTER (HCC): ICD-10-CM

## 2021-01-15 PROCEDURE — 3725F SCREEN DEPRESSION PERFORMED: CPT | Performed by: INTERNAL MEDICINE

## 2021-01-15 PROCEDURE — 99214 OFFICE O/P EST MOD 30 MIN: CPT | Performed by: INTERNAL MEDICINE

## 2021-01-15 RX ORDER — CYCLOBENZAPRINE HCL 5 MG
5 TABLET ORAL 3 TIMES DAILY PRN
Qty: 60 TABLET | Refills: 0 | Status: SHIPPED | OUTPATIENT
Start: 2021-01-15 | End: 2021-03-12 | Stop reason: ALTCHOICE

## 2021-01-15 RX ORDER — SERTRALINE HYDROCHLORIDE 100 MG/1
TABLET, FILM COATED ORAL
Qty: 90 TABLET | Refills: 1 | Status: SHIPPED | OUTPATIENT
Start: 2021-01-15 | End: 2021-07-08

## 2021-01-15 RX ORDER — BUPRENORPHINE AND NALOXONE 2; .5 MG/1; MG/1
1 FILM, SOLUBLE BUCCAL; SUBLINGUAL DAILY
Qty: 30 FILM | Refills: 0 | Status: SHIPPED | OUTPATIENT
Start: 2021-01-15 | End: 2021-02-15 | Stop reason: SDUPTHER

## 2021-01-15 NOTE — ASSESSMENT & PLAN NOTE
Postpartum edema most likely secondary to fluid and hormonal shifts occurring post delivery  The patient was advised to limit salt intake, avoid prolonged periods of standing, elevate legs whenever possible and use of compression stockings with a pressure rating of 20 to 30  We will check some routine lab work in addition of thyroid function studies and urinalysis

## 2021-01-15 NOTE — ASSESSMENT & PLAN NOTE
Patient has been taking a full 2 mg film of Suboxone of late  We will continue with that dose and initiate a taper when the patient feels comfortable

## 2021-01-15 NOTE — ASSESSMENT & PLAN NOTE
Right-sided neck pain  No palpable abnormality  No significant muscle spasm was appreciated but the patient has significant pain on rotation, lateral flexion and extension    Will provide the patient with a prescription for 5 mg cyclobenzaprine

## 2021-01-15 NOTE — PROGRESS NOTES
Assessment/Plan:    Suboxone maintenance treatment complicating pregnancy, antepartum, third trimester ([facility] )  Patient has been taking a full 2 mg film of Suboxone of late  We will continue with that dose and initiate a taper when the patient feels comfortable  Edema neonatorum  Postpartum edema most likely secondary to fluid and hormonal shifts occurring post delivery  The patient was advised to limit salt intake, avoid prolonged periods of standing, elevate legs whenever possible and use of compression stockings with a pressure rating of 20 to 30  We will check some routine lab work in addition of thyroid function studies and urinalysis  Depression  Sertraline was renewed  Neck pain  Right-sided neck pain  No palpable abnormality  No significant muscle spasm was appreciated but the patient has significant pain on rotation, lateral flexion and extension  Will provide the patient with a prescription for 5 mg cyclobenzaprine       Diagnoses and all orders for this visit:    Edema neonatorum  -     CBC and differential; Future  -     Comprehensive metabolic panel; Future  -     C-reactive protein; Future  -     UA (URINE) with reflex to Scope  -     TSH, 3rd generation; Future  -     T4, free; Future  -     T3; Future    Depression, unspecified depression type  -     sertraline (ZOLOFT) 100 mg tablet; Take 1 tablet daily with 50 mg tablet  Suboxone maintenance treatment complicating pregnancy, antepartum, third trimester ([facility] )  -     buprenorphine-naloxone (SUBOXONE) 2-0 5 mg; Place 1 Film under the tongue daily    Neck pain  -     cyclobenzaprine (FLEXERIL) 5 mg tablet; Take 1 tablet (5 mg total) by mouth 3 (three) times a day as needed (Neck pain) for up to 20 days               Subjective:          Patient ID: Mirtha Patel is a 41 y o  female  Patient is here to establish care  Patient is also here to continue Suboxone treatment, which she had during pregnancy  Patient delivery on 12/29/20  Patient is having swelling of bilateral feet and hands for the past three days  It has been getting progressively worse over this time period  There is also numbness in DIP/PIP on bilateral hands  Patient states she slept in a recliner 4 days ago which caused her neck pain since that time  She has been taking ibuprofen 800mg q6h since that time  Family History   Problem Relation Age of Onset    Heart attack Mother     Cancer Mother         LEIOMYOSARCOMA    Seizures Father         EPILEPSY    Stroke Father     Diabetes Brother     No Known Problems Daughter     No Known Problems Son     Hypertension Brother     No Known Problems Brother     Kidney disease Brother     Bipolar disorder Brother     Heart defect Daughter         ASD?      Social History     Socioeconomic History    Marital status:      Spouse name: Not on file    Number of children: Not on file    Years of education: Not on file    Highest education level: Not on file   Occupational History    Not on file   Social Needs    Financial resource strain: Not on file    Food insecurity     Worry: Not on file     Inability: Not on file    Transportation needs     Medical: Not on file     Non-medical: Not on file   Tobacco Use    Smoking status: Current Every Day Smoker     Packs/day: 1 00     Years: 25 00     Pack years: 25 00     Types: Cigarettes    Smokeless tobacco: Never Used   Substance and Sexual Activity    Alcohol use: No     Frequency: Never     Comment: former ETOH abuse-per pt    Drug use: No     Comment: former addict-per doctor PM, H/O INTRAVENOUS DRUG USE IN REMISSION    Sexual activity: Yes     Partners: Male   Lifestyle    Physical activity     Days per week: Not on file     Minutes per session: Not on file    Stress: Not on file   Relationships    Social connections     Talks on phone: Not on file     Gets together: Not on file     Attends Protestant service: Not on file     Active member of club or organization: Not on file     Attends meetings of clubs or organizations: Not on file     Relationship status: Not on file    Intimate partner violence     Fear of current or ex partner: Not on file     Emotionally abused: Not on file     Physically abused: Not on file     Forced sexual activity: Not on file   Other Topics Concern    Not on file   Social History Narrative    DAILY [employer], 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     Past Medical History:   Diagnosis Date    Arthritis     shoulders    Cro[facility] disease ([facility] )     Depression     Drug abuse ([facility] )     Ganglion cyst of wrist, right     LAST ASSESSED: [id number]    Hepatitis C     dx 15 yrs ago-2015 retested told undetectable    Hepatitis C virus infection     LAST ASSESSED: [id number]    Hordeolum externum left eye, unspecified eyelid     LAST ASSESSED: [id number]    Pyelonephritis     pyelonephritis    Shingles     Stargardt's disease     Stargardts Disease     dx when 23years old    Tear of right scapholunate ligament     LAST ASSESSED: [id number]    Varicella     as child    Wears glasses     reading       Current Outpatient Medications:     acetaminophen (TYLENOL) 325 mg tablet, Take 2 tablets (650 mg total) by mouth every 4 (four) hours as needed for mild pain, Disp:  , Rfl: 0    albuterol (Ventolin HFA) 90 mcg/act inhaler, Inhale 2 puffs every 6 (six) hours as needed for wheezing or shortness of breath, Disp: 18 g, Rfl: 0    ibuprofen (MOTRIN) 600 mg tablet, Take 1 tablet (600 mg total) by mouth every 6 (six) hours as needed for moderate pain (cramping), Disp: 30 tablet, Rfl: 0    mesalamine (PENTASA) 500 mg CR capsule, Take 1 capsule (500 mg total) by mouth 3 (three) times a day, Disp: 270 capsule, Rfl: 3    Prenatal Vit-Fe Fumarate-FA (PRENATAL 1+1 PO), Take by mouth, Disp: , Rfl:     sertraline (ZOLOFT) 100 mg tablet, Take 1 tablet daily with 50 mg tablet , Disp: 90 tablet, Rfl: 1    sertraline (ZOLOFT) 25 mg tablet, Take 1 tablet daily with 100 mg tablet , Disp: 90 tablet, Rfl: 1    buprenorphine-naloxone (SUBOXONE) 2-0 5 mg, Place 1 Film under the tongue daily, Disp: 30 Film, Rfl: 0    cyclobenzaprine (FLEXERIL) 5 mg tablet, Take 1 tablet (5 mg total) by mouth 3 (three) times a day as needed (Neck pain) for up to 20 days, Disp: 60 tablet, Rfl: 0    naloxone (NARCAN) 4 mg/0 1 mL nasal spray, Administer 1 spray into a nostril  If no response after 2-3 minutes, give another dose in the other nostril using a new spray  (Patient not taking: Reported on 2020), Disp: 1 each, Rfl: 1  No Known Allergies  Past Surgical History:   Procedure Laterality Date     SECTION      LAST ASSESSED:     INDUCED       SURIGCAL TREATMENT FOR     LIVER BIOPSY N/A     CO ESOPHAGOGASTRODUODENOSCOPY TRANSORAL DIAGNOSTIC N/A 2016    Procedure: ESOPHAGOGASTRODUODENOSCOPY (EGD); Surgeon: George Mcdaniel MD;  Location: [facility]; Service: Gastroenterology    WISDOM TOOTH EXTRACTION           Review of Systems   Constitutional: Negative for chills, fatigue and fever  HENT: Negative  Respiratory: Negative  Cardiovascular: Positive for leg swelling  Negative for chest pain and palpitations  Gastrointestinal: Negative  Genitourinary: Negative  Musculoskeletal: Positive for neck pain  Negative for arthralgias and back pain  Skin: Negative  Psychiatric/Behavioral: Negative            Past Medical History:   Diagnosis Date    Arthritis     shoulders    Cro[facility] disease ([facility]     Depression     Drug abuse ([facility]     Ganglion cyst of wrist, right     LAST ASSESSED: [id number]    Hepatitis C     dx 15 yrs ago- retested told undetectable    Hepatitis C virus infection     LAST ASSESSED: [id number]    Hordeolum externum left eye, unspecified eyelid     LAST ASSESSED: [id number]    Pyelonephritis     pyelonephritis    Shingles     Stargardt's disease     Stargardts Disease     dx when 23years old    Tear of right scapholunate ligament     LAST ASSESSED: [id number]    Varicella     as child    Wears glasses     reading         Current Outpatient Medications:     acetaminophen (TYLENOL) 325 mg tablet, Take 2 tablets (650 mg total) by mouth every 4 (four) hours as needed for mild pain, Disp:  , Rfl: 0    albuterol (Ventolin HFA) 90 mcg/act inhaler, Inhale 2 puffs every 6 (six) hours as needed for wheezing or shortness of breath, Disp: 18 g, Rfl: 0    ibuprofen (MOTRIN) 600 mg tablet, Take 1 tablet (600 mg total) by mouth every 6 (six) hours as needed for moderate pain (cramping), Disp: 30 tablet, Rfl: 0    mesalamine (PENTASA) 500 mg CR capsule, Take 1 capsule (500 mg total) by mouth 3 (three) times a day, Disp: 270 capsule, Rfl: 3    Prenatal Vit-Fe Fumarate-FA (PRENATAL 1+1 PO), Take by mouth, Disp: , Rfl:     sertraline (ZOLOFT) 100 mg tablet, Take 1 tablet daily with 50 mg tablet , Disp: 90 tablet, Rfl: 1    sertraline (ZOLOFT) 25 mg tablet, Take 1 tablet daily with 100 mg tablet , Disp: 90 tablet, Rfl: 1    buprenorphine-naloxone (SUBOXONE) 2-0 5 mg, Place 1 Film under the tongue daily, Disp: 30 Film, Rfl: 0    cyclobenzaprine (FLEXERIL) 5 mg tablet, Take 1 tablet (5 mg total) by mouth 3 (three) times a day as needed (Neck pain) for up to 20 days, Disp: 60 tablet, Rfl: 0    naloxone (NARCAN) 4 mg/0 1 mL nasal spray, Administer 1 spray into a nostril  If no response after 2-3 minutes, give another dose in the other nostril using a new spray  (Patient not taking: Reported on 2020), Disp: 1 each, Rfl: 1    No Known Allergies    Social History   Past Surgical History:   Procedure Laterality Date     SECTION      LAST ASSESSED: [id number]    INDUCED       SURIGCAL TREATMENT FOR     LIVER BIOPSY N/A     NM ESOPHAGOGASTRODUODENOSCOPY TRANSORAL DIAGNOSTIC N/A 2016    Procedure: ESOPHAGOGASTRODUODENOSCOPY (EGD);   Surgeon: Hua Camilo MD;  Location: [facility]; Service: Gastroenterology    WISDOM TOOTH EXTRACTION       Family History   Problem Relation Age of Onset    Heart attack Mother     Cancer Mother         LEIOMYOSARCOMA    Seizures Father         EPILEPSY    Stroke Father     Diabetes Brother     No Known Problems Daughter     No Known Problems Son     Hypertension Brother     No Known Problems Brother     Kidney disease Brother     Bipolar disorder Brother     Heart defect Daughter         ASD? Objective:  /88 (BP Location: Left arm, Patient Position: Sitting, Cuff Size: Standard)   Pulse 90   Temp 97 7 °F (36 5 °C) (Tympanic)   Ht 5' 7 87" (1 724 m)   Wt 82 6 kg (182 lb)   LMP 04/12/2020 (Approximate)   SpO2 98%   BMI 27 78 kg/m²   Body mass index is 27 78 kg/m²  Physical Exam  Constitutional:       Appearance: Normal appearance  Neck:      Musculoskeletal: Muscular tenderness present  Comments: Pain with rotation   Cardiovascular:      Rate and Rhythm: Normal rate and regular rhythm  Pulses: Normal pulses  Heart sounds: Murmur present  Comments: 2/6 holosystolic murmur  Pulmonary:      Effort: Pulmonary effort is normal  No respiratory distress  Breath sounds: Normal breath sounds  Abdominal:      General: Abdomen is flat  Palpations: Abdomen is soft  Musculoskeletal: Normal range of motion  Right lower leg: Edema present  Left lower leg: Edema present  Skin:     General: Skin is warm and dry  Neurological:      Mental Status: She is alert and oriented to person, place, and time  Psychiatric:         Mood and Affect: Mood normal          Behavior: Behavior normal          Thought Content:  Thought content normal          Judgment: Judgment normal

## 2021-01-20 ENCOUNTER — TELEPHONE (OUTPATIENT)
Dept: OBGYN CLINIC | Facility: CLINIC | Age: 41
End: 2021-01-20

## 2021-01-22 ENCOUNTER — TELEPHONE (OUTPATIENT)
Dept: OBGYN CLINIC | Facility: CLINIC | Age: 41
End: 2021-01-22

## 2021-01-28 ENCOUNTER — POSTPARTUM VISIT (OUTPATIENT)
Dept: OBGYN CLINIC | Facility: CLINIC | Age: 41
End: 2021-01-28

## 2021-01-28 VITALS
SYSTOLIC BLOOD PRESSURE: 132 MMHG | DIASTOLIC BLOOD PRESSURE: 90 MMHG | HEIGHT: 69 IN | BODY MASS INDEX: 26.07 KG/M2 | WEIGHT: 176 LBS

## 2021-01-28 DIAGNOSIS — O34.219 VAGINAL BIRTH AFTER CESAREAN: ICD-10-CM

## 2021-01-28 PROBLEM — Z98.891 HISTORY OF CESAREAN SECTION: Status: RESOLVED | Noted: 2020-07-08 | Resolved: 2021-01-28

## 2021-01-28 PROBLEM — O99.343 DEPRESSION AFFECTING PREGNANCY IN THIRD TRIMESTER, ANTEPARTUM: Status: RESOLVED | Noted: 2020-10-26 | Resolved: 2021-01-28

## 2021-01-28 PROBLEM — F41.9 ANXIETY IN PREGNANCY IN THIRD TRIMESTER, ANTEPARTUM: Status: RESOLVED | Noted: 2020-10-26 | Resolved: 2021-01-28

## 2021-01-28 PROBLEM — F32.A DEPRESSION AFFECTING PREGNANCY IN THIRD TRIMESTER, ANTEPARTUM: Status: RESOLVED | Noted: 2020-10-26 | Resolved: 2021-01-28

## 2021-01-28 PROBLEM — Z34.90 PREGNANCY: Status: RESOLVED | Noted: 2020-12-03 | Resolved: 2021-01-28

## 2021-01-28 PROBLEM — O09.523 MULTIGRAVIDA OF ADVANCED MATERNAL AGE IN THIRD TRIMESTER: Status: RESOLVED | Noted: 2020-07-08 | Resolved: 2021-01-28

## 2021-01-28 PROBLEM — Z20.822 CLOSE EXPOSURE TO COVID-19 VIRUS: Status: RESOLVED | Noted: 2021-01-07 | Resolved: 2021-01-28

## 2021-01-28 PROBLEM — O35.2XX0 PREVIOUS CHILD WITH CONGENITAL ANOMALY, CURRENTLY PREGNANT, ANTEPARTUM: Status: RESOLVED | Noted: 2020-07-08 | Resolved: 2021-01-28

## 2021-01-28 PROBLEM — Z3A.38 38 WEEKS GESTATION OF PREGNANCY: Status: RESOLVED | Noted: 2020-09-02 | Resolved: 2021-01-28

## 2021-01-28 PROBLEM — O99.343 ANXIETY IN PREGNANCY IN THIRD TRIMESTER, ANTEPARTUM: Status: RESOLVED | Noted: 2020-10-26 | Resolved: 2021-01-28

## 2021-01-28 PROCEDURE — 3008F BODY MASS INDEX DOCD: CPT | Performed by: INTERNAL MEDICINE

## 2021-01-28 PROCEDURE — 99024 POSTOP FOLLOW-UP VISIT: CPT | Performed by: OBSTETRICS & GYNECOLOGY

## 2021-01-28 NOTE — PROGRESS NOTES
Assessment/Plan:      Normal postpartum check   Released for normal activity   Return to office for H and P/preop secondary to multiparity requesting permanent sterilization, after procedure is scheduled         Problem List Items Addressed This Visit        Other    Vaginal birth after      (spontaneous vaginal delivery)    Postpartum care following vaginal delivery - Primary            Subjective:      Patient ID: Mirtha Patel is a 41 y o  female  Jessa Jurado is here today for a postpartum visit  She delivered approximately 3 weeks ago vaginally without complication  On 2020  She is both breast and bottle feeding the bottle-feeding is being done with formula  Bowels and bladder back to normal   She has minimal lochia  She desires permanent sterilization for contraception we reviewed the risks and benefits associated with sterilization as well as a laparoscopic procedure  We did review both consents the  [facility] consent and they were signed  We will schedule it and have her return the week prior  For her H&P  The following portions of the patient's history were reviewed and updated as appropriate: allergies, current medications, past family history, past medical history, past social history, past surgical history and problem list     Review of Systems   Constitutional: Negative for chills, fatigue, fever and unexpected weight change  HENT: Negative for dental problem, sinus pressure and sinus pain  Eyes: Negative for visual disturbance  Respiratory: Negative for cough, shortness of breath and wheezing  Cardiovascular: Negative for chest pain and leg swelling  Gastrointestinal: Negative for constipation, diarrhea, nausea and vomiting  Genitourinary: Negative for urgency  Musculoskeletal: Negative for back pain and joint swelling  Allergic/Immunologic: Negative for environmental allergies  Neurological: Negative for dizziness and headaches  Psychiatric/Behavioral: The patient is not nervous/anxious  Objective:      LMP 04/12/2020 (Approximate)     /90 (BP Location: Left arm, Patient Position: Sitting, Cuff Size: Standard)   Ht 5' 9" (1 753 m)   Wt 79 8 kg (176 lb)   LMP 04/12/2020 (Approximate)   Breastfeeding Yes Comment: Breast and bottle  BMI 25 99 kg/m²        Physical Exam  Vitals signs reviewed  Constitutional:       General: She is not in acute distress  Appearance: Normal appearance  She is well-developed and normal weight  She is not ill-appearing  Comments: Young white female   HENT:      Head: Normocephalic and atraumatic  Abdominal:      General: There is no distension  Palpations: Abdomen is soft  There is no mass  Tenderness: There is no abdominal tenderness  There is no guarding or rebound  Genitourinary:     Comments: Perineum intact  Cervix closed  Uterus well involuted, mobile and nontender  Neurological:      Mental Status: She is alert and oriented to person, place, and time  Psychiatric:         Behavior: Behavior normal          Thought Content: Thought content normal          Judgment: Judgment normal       Comments: Postpartum depression score equal 4

## 2021-02-10 ENCOUNTER — APPOINTMENT (OUTPATIENT)
Dept: LAB | Facility: HOSPITAL | Age: 41
End: 2021-02-10
Attending: INTERNAL MEDICINE
Payer: COMMERCIAL

## 2021-02-10 DIAGNOSIS — Z30.2 ENCOUNTER FOR FEMALE STERILIZATION PROCEDURE: ICD-10-CM

## 2021-02-10 LAB
ALBUMIN SERPL BCP-MCNC: 3.7 G/DL (ref 3.5–5)
ALP SERPL-CCNC: 61 U/L (ref 46–116)
ALT SERPL W P-5'-P-CCNC: 23 U/L (ref 12–78)
ANION GAP SERPL CALCULATED.3IONS-SCNC: 8 MMOL/L (ref 4–13)
AST SERPL W P-5'-P-CCNC: 18 U/L (ref 5–45)
BACTERIA UR QL AUTO: ABNORMAL /HPF
BASOPHILS # BLD AUTO: 0.07 THOUSANDS/ΜL (ref 0–0.1)
BASOPHILS NFR BLD AUTO: 1 % (ref 0–1)
BILIRUB SERPL-MCNC: 0.34 MG/DL (ref 0.2–1)
BILIRUB UR QL STRIP: NEGATIVE
BUN SERPL-MCNC: 15 MG/DL (ref 5–25)
CALCIUM SERPL-MCNC: 9 MG/DL (ref 8.3–10.1)
CHLORIDE SERPL-SCNC: 102 MMOL/L (ref 100–108)
CLARITY UR: CLEAR
CO2 SERPL-SCNC: 26 MMOL/L (ref 21–32)
COLOR UR: YELLOW
CREAT SERPL-MCNC: 0.98 MG/DL (ref 0.6–1.3)
CRP SERPL QL: <3 MG/L
EOSINOPHIL # BLD AUTO: 0.25 THOUSAND/ΜL (ref 0–0.61)
EOSINOPHIL NFR BLD AUTO: 4 % (ref 0–6)
ERYTHROCYTE [DISTWIDTH] IN BLOOD BY AUTOMATED COUNT: 12.9 % (ref 11.6–15.1)
GFR SERPL CREATININE-BSD FRML MDRD: 71 ML/MIN/1.73SQ M
GLUCOSE SERPL-MCNC: 96 MG/DL (ref 65–140)
GLUCOSE UR STRIP-MCNC: NEGATIVE MG/DL
HCT VFR BLD AUTO: 40.1 % (ref 34.8–46.1)
HGB BLD-MCNC: 13.2 G/DL (ref 11.5–15.4)
HGB UR QL STRIP.AUTO: ABNORMAL
IMM GRANULOCYTES # BLD AUTO: 0.02 THOUSAND/UL (ref 0–0.2)
IMM GRANULOCYTES NFR BLD AUTO: 0 % (ref 0–2)
KETONES UR STRIP-MCNC: NEGATIVE MG/DL
LEUKOCYTE ESTERASE UR QL STRIP: NEGATIVE
LYMPHOCYTES # BLD AUTO: 1.94 THOUSANDS/ΜL (ref 0.6–4.47)
LYMPHOCYTES NFR BLD AUTO: 27 % (ref 14–44)
MCH RBC QN AUTO: 29.9 PG (ref 26.8–34.3)
MCHC RBC AUTO-ENTMCNC: 32.9 G/DL (ref 31.4–37.4)
MCV RBC AUTO: 91 FL (ref 82–98)
MONOCYTES # BLD AUTO: 0.84 THOUSAND/ΜL (ref 0.17–1.22)
MONOCYTES NFR BLD AUTO: 12 % (ref 4–12)
NEUTROPHILS # BLD AUTO: 4.08 THOUSANDS/ΜL (ref 1.85–7.62)
NEUTS SEG NFR BLD AUTO: 56 % (ref 43–75)
NITRITE UR QL STRIP: NEGATIVE
NON-SQ EPI CELLS URNS QL MICRO: ABNORMAL /HPF
NRBC BLD AUTO-RTO: 0 /100 WBCS
PH UR STRIP.AUTO: 5.5 [PH]
PLATELET # BLD AUTO: 276 THOUSANDS/UL (ref 149–390)
PMV BLD AUTO: 9.9 FL (ref 8.9–12.7)
POTASSIUM SERPL-SCNC: 4.1 MMOL/L (ref 3.5–5.3)
PROT SERPL-MCNC: 7.1 G/DL (ref 6.4–8.2)
PROT UR STRIP-MCNC: ABNORMAL MG/DL
RBC # BLD AUTO: 4.41 MILLION/UL (ref 3.81–5.12)
RBC #/AREA URNS AUTO: ABNORMAL /HPF
SODIUM SERPL-SCNC: 136 MMOL/L (ref 136–145)
SP GR UR STRIP.AUTO: 1.02 (ref 1–1.03)
T3 SERPL-MCNC: 1.3 NG/ML (ref 0.6–1.8)
T4 FREE SERPL-MCNC: 0.68 NG/DL (ref 0.76–1.46)
TSH SERPL DL<=0.05 MIU/L-ACNC: 0.57 UIU/ML (ref 0.36–3.74)
UROBILINOGEN UR QL STRIP.AUTO: 0.2 E.U./DL
WBC # BLD AUTO: 7.2 THOUSAND/UL (ref 4.31–10.16)
WBC #/AREA URNS AUTO: ABNORMAL /HPF

## 2021-02-10 PROCEDURE — 36415 COLL VENOUS BLD VENIPUNCTURE: CPT

## 2021-02-10 PROCEDURE — 85025 COMPLETE CBC W/AUTO DIFF WBC: CPT

## 2021-02-10 PROCEDURE — 84480 ASSAY TRIIODOTHYRONINE (T3): CPT

## 2021-02-10 PROCEDURE — 84443 ASSAY THYROID STIM HORMONE: CPT

## 2021-02-10 PROCEDURE — 80053 COMPREHEN METABOLIC PANEL: CPT

## 2021-02-10 PROCEDURE — 84439 ASSAY OF FREE THYROXINE: CPT

## 2021-02-10 PROCEDURE — 81001 URINALYSIS AUTO W/SCOPE: CPT | Performed by: INTERNAL MEDICINE

## 2021-02-10 PROCEDURE — 86140 C-REACTIVE PROTEIN: CPT

## 2021-02-11 ENCOUNTER — OFFICE VISIT (OUTPATIENT)
Dept: OBGYN CLINIC | Facility: HOSPITAL | Age: 41
End: 2021-02-11
Payer: COMMERCIAL

## 2021-02-11 ENCOUNTER — HOSPITAL ENCOUNTER (OUTPATIENT)
Dept: RADIOLOGY | Facility: HOSPITAL | Age: 41
Discharge: HOME | End: 2021-02-11
Payer: COMMERCIAL

## 2021-02-11 VITALS — BODY MASS INDEX: 25.99 KG/M2 | HEIGHT: 69 IN

## 2021-02-11 DIAGNOSIS — G56.03 BILATERAL CARPAL TUNNEL SYNDROME: Primary | ICD-10-CM

## 2021-02-11 DIAGNOSIS — M25.531 BILATERAL WRIST PAIN: ICD-10-CM

## 2021-02-11 DIAGNOSIS — G56.03 BILATERAL CARPAL TUNNEL SYNDROME: ICD-10-CM

## 2021-02-11 DIAGNOSIS — M25.532 BILATERAL WRIST PAIN: ICD-10-CM

## 2021-02-11 DIAGNOSIS — M25.532 BILATERAL WRIST PAIN: Primary | ICD-10-CM

## 2021-02-11 DIAGNOSIS — M25.531 BILATERAL WRIST PAIN: Primary | ICD-10-CM

## 2021-02-11 PROCEDURE — 99213 OFFICE O/P EST LOW 20 MIN: CPT | Performed by: PHYSICIAN ASSISTANT

## 2021-02-11 PROCEDURE — 73110 X-RAY EXAM OF WRIST: CPT
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
## 2021-02-15 ENCOUNTER — OFFICE VISIT (OUTPATIENT)
Dept: INTERNAL MEDICINE CLINIC | Facility: CLINIC | Age: 41
End: 2021-02-15
Payer: COMMERCIAL

## 2021-02-15 VITALS
BODY MASS INDEX: 26.76 KG/M2 | DIASTOLIC BLOOD PRESSURE: 78 MMHG | WEIGHT: 176.6 LBS | OXYGEN SATURATION: 97 % | TEMPERATURE: 97 F | HEART RATE: 104 BPM | HEIGHT: 68 IN | SYSTOLIC BLOOD PRESSURE: 126 MMHG

## 2021-02-15 DIAGNOSIS — F11.20 SUBOXONE MAINTENANCE TREATMENT COMPLICATING PREGNANCY, ANTEPARTUM, THIRD TRIMESTER (HCC): ICD-10-CM

## 2021-02-15 DIAGNOSIS — O99.323 SUBOXONE MAINTENANCE TREATMENT COMPLICATING PREGNANCY, ANTEPARTUM, THIRD TRIMESTER (HCC): ICD-10-CM

## 2021-02-15 PROCEDURE — 99213 OFFICE O/P EST LOW 20 MIN: CPT | Performed by: INTERNAL MEDICINE

## 2021-02-15 RX ORDER — BUPRENORPHINE AND NALOXONE 2; .5 MG/1; MG/1
2 FILM, SOLUBLE BUCCAL; SUBLINGUAL DAILY
Qty: 60 FILM | Refills: 1 | Status: SHIPPED | OUTPATIENT
Start: 2021-02-15 | End: 2021-03-15 | Stop reason: SDUPTHER

## 2021-02-15 NOTE — PROGRESS NOTES
Assessment/Plan:    Suboxone maintenance treatment complicating pregnancy, antepartum, third trimester ([facility] )    Will increase the patient's Suboxone dosing from 2 mg to 4 mg and re-evaluate in 1 month       Diagnoses and all orders for this visit:    Suboxone maintenance treatment complicating pregnancy, antepartum, third trimester ([facility] )  -     buprenorphine-naloxone (SUBOXONE) 2-0 5 mg; Place 2 Film under the tongue daily          Subjective:      Patient ID: Mirtha Patel is a 41 y o  female  Patient is seen for follow-up visit for Suboxone maintenance and monitoring  Her lower extremity edema has improved significantly with the use of compression stockings  Most likely this will continue to improve as her system recovers from pregnancy  She has been feeling a little bit uncomfortable with her current dosing of Suboxone and would like to know if it is possible to have an increase in her dose  She is experiencing episodes of some diaphoresis and just a general feeling of discomfort  Family History   Problem Relation Age of Onset    Heart attack Mother     Cancer Mother         LEIOMYOSARCOMA    Seizures Father         EPILEPSY    Stroke Father     Diabetes Brother     No Known Problems Daughter     No Known Problems Son     Hypertension Brother     No Known Problems Brother     Kidney disease Brother     Bipolar disorder Brother     Heart defect Daughter         ASD?      Social History     Socioeconomic History    Marital status:      Spouse name: Not on file    Number of children: Not on file    Years of education: Not on file    Highest education level: Not on file   Occupational History    Not on file   Social Needs    Financial resource strain: Not on file    Food insecurity     Worry: Not on file     Inability: Not on file    Transportation needs     Medical: Not on file     Non-medical: Not on file   Tobacco Use    Smoking status: Current Every Day Smoker Packs/day: 1 00     Years: 25 00     Pack years: 25 00     Types: Cigarettes    Smokeless tobacco: Never Used   Substance and Sexual Activity    Alcohol use: No     Frequency: Never     Comment: former ETOH abuse-per pt    Drug use: No     Comment: former addict-per doctor PMH, H/O INTRAVENOUS DRUG USE IN REMISSION    Sexual activity: Yes     Partners: Male   Lifestyle    Physical activity     Days per week: Not on file     Minutes per session: Not on file    Stress: Not on file   Relationships    Social connections     Talks on phone: Not on file     Gets together: Not on file     Attends Caodaism service: Not on file     Active member of club or organization: Not on file     Attends meetings of clubs or organizations: Not on file     Relationship status: Not on file    Intimate partner violence     Fear of current or ex partner: Not on file     Emotionally abused: Not on file     Physically abused: Not on file     Forced sexual activity: Not on file   Other Topics Concern    Not on file   Social History Narrative    DAILY [employer], 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     Past Medical History:   Diagnosis Date    Arthritis     shoulders    [facility] ([facility] )     Depression     Drug abuse ([facility] )     Ganglion cyst of wrist, right     LAST ASSESSED: [id number]    Hepatitis C     dx 15 yrs ago-2015 retested told undetectable    Hepatitis C virus infection     LAST ASSESSED: [id number]    Hordeolum externum left eye, unspecified eyelid     LAST ASSESSED: [id number]    Pyelonephritis     pyelonephritis    Shingles     Stargardt's disease     Stargardts Disease     dx when 23years old    Tear of right scapholunate ligament     LAST ASSESSED: [id number]    Varicella     as child    Wears glasses     reading       Current Outpatient Medications:     buprenorphine-naloxone (SUBOXONE) 2-0 5 mg, Place 2 Film under the tongue daily, Disp: 60 Film, Rfl: 1    ibuprofen (MOTRIN) 600 mg tablet, Take 1 tablet (600 mg total) by mouth every 6 (six) hours as needed for moderate pain (cramping), Disp: 30 tablet, Rfl: 0    mesalamine (PENTASA) 500 mg CR capsule, Take 1 capsule (500 mg total) by mouth 3 (three) times a day, Disp: 270 capsule, Rfl: 3    sertraline (ZOLOFT) 100 mg tablet, Take 1 tablet daily with 50 mg tablet  (Patient taking differently: Take 100 mg by mouth daily Total of 125 mg daily), Disp: 90 tablet, Rfl: 1    sertraline (ZOLOFT) 25 mg tablet, Take 1 tablet daily with 100 mg tablet  (Patient taking differently: Take 25 mg by mouth daily Total 125 mg daily), Disp: 90 tablet, Rfl: 1    acetaminophen (TYLENOL) 325 mg tablet, Take 2 tablets (650 mg total) by mouth every 4 (four) hours as needed for mild pain (Patient not taking: Reported on 2021), Disp:  , Rfl: 0    albuterol (Ventolin HFA) 90 mcg/act inhaler, Inhale 2 puffs every 6 (six) hours as needed for wheezing or shortness of breath (Patient not taking: Reported on 2021), Disp: 18 g, Rfl: 0    cyclobenzaprine (FLEXERIL) 5 mg tablet, Take 1 tablet (5 mg total) by mouth 3 (three) times a day as needed (Neck pain) for up to 20 days (Patient not taking: Reported on 2021), Disp: 60 tablet, Rfl: 0    naloxone (NARCAN) 4 mg/0 1 mL nasal spray, Administer 1 spray into a nostril  If no response after 2-3 minutes, give another dose in the other nostril using a new spray  (Patient not taking: Reported on 2020), Disp: 1 each, Rfl: 1    Prenatal Vit-Fe Fumarate-FA (PRENATAL 1+1 PO), Take by mouth, Disp: , Rfl:   No Known Allergies  Past Surgical History:   Procedure Laterality Date     SECTION      LAST ASSESSED: [id number]    INDUCED       SURIGCAL TREATMENT FOR     LIVER BIOPSY N/A     [place] ESOPHAGOGASTRODUODENOSCOPY TRANSORAL DIAGNOSTIC N/A 2016    Procedure: ESOPHAGOGASTRODUODENOSCOPY (EGD); Surgeon: Lexus Busby MD;  Location: [facility];   Service: Gastroenterology    WISDOM TOOTH EXTRACTION Review of Systems   Constitutional: Negative for diaphoresis  HENT: Negative  Eyes: Negative  Respiratory: Negative  Cardiovascular: Negative  Gastrointestinal: Negative  Endocrine: Negative  Genitourinary: Negative  Musculoskeletal: Positive for myalgias  Neurological: Negative  Psychiatric/Behavioral: Positive for dysphoric mood  Objective:      /78 (BP Location: Left arm, Patient Position: Sitting, Cuff Size: Standard)   Pulse 104   Temp (!) 97 °F (36 1 °C) (Tympanic)   Ht 5' 7 72" (1 72 m)   Wt 80 1 kg (176 lb 9 6 oz)   SpO2 97%   BMI 27 08 kg/m²          Physical Exam  Vitals signs reviewed  Constitutional:       General: She is not in acute distress  Appearance: Normal appearance  She is normal weight  She is not ill-appearing, toxic-appearing or diaphoretic  HENT:      Head: Normocephalic and atraumatic  Nose: Nose normal    Eyes:      Conjunctiva/sclera: Conjunctivae normal       Pupils: Pupils are equal, round, and reactive to light  Neck:      Musculoskeletal: No neck rigidity or muscular tenderness  Cardiovascular:      Rate and Rhythm: Normal rate  Heart sounds: No murmur  Pulmonary:      Effort: Pulmonary effort is normal  No respiratory distress  Abdominal:      General: Abdomen is flat  There is no distension  Musculoskeletal:         General: No swelling, deformity or signs of injury  Right lower leg: Edema (Trace to 1+) present  Left lower leg: Edema (Trace to 1+) present  Lymphadenopathy:      Cervical: No cervical adenopathy  Skin:     General: Skin is warm  Coloration: Skin is not jaundiced  Findings: No erythema  Neurological:      General: No focal deficit present  Mental Status: She is oriented to person, place, and time  Mental status is at baseline  Psychiatric:         Mood and Affect: Mood normal          Behavior: Behavior normal          Thought Content:  Thought content normal          Judgment: Judgment normal

## 2021-03-09 ENCOUNTER — OFFICE VISIT (OUTPATIENT)
Dept: OBGYN CLINIC | Facility: MEDICAL CENTER | Age: 41
End: 2021-03-09
Payer: COMMERCIAL

## 2021-03-09 VITALS
HEART RATE: 80 BPM | TEMPERATURE: 98 F | WEIGHT: 176 LBS | HEIGHT: 69 IN | BODY MASS INDEX: 26.07 KG/M2 | DIASTOLIC BLOOD PRESSURE: 82 MMHG | SYSTOLIC BLOOD PRESSURE: 124 MMHG

## 2021-03-09 DIAGNOSIS — G56.03 BILATERAL CARPAL TUNNEL SYNDROME: Primary | ICD-10-CM

## 2021-03-09 PROCEDURE — 99214 OFFICE O/P EST MOD 30 MIN: CPT | Performed by: ORTHOPAEDIC SURGERY

## 2021-03-09 NOTE — PROGRESS NOTES
Chief Complaint     Bilateral hand numbness/tingling       History of Present Illness     Mirtha Patel is a 35 y o  right hand dominant female who presents to the office today for bilateral hand numbness/tingling  Mirtha was referred to the office for consultation at the request of Glenny Meneses  Mirtha states that she has been experiencing numbness and tingling to her radial 3 digits for aprox  6 weeks, after giving birth  She states her left hand is worse then her right  Mirtha notes weakness to her hands, specifically when opening jars  She also notes difficulty when buttoning the buttons on her sons vicky  She also states that she is dropping objects  Mirtha has been bracing at night as much as possible, which has provided with with partial improvement in her symptoms  Past Medical History:   Diagnosis Date    Arthritis     shoulders    [facility] disease ([facility] [address] [facility] )     Depression     Drug abuse ([facility] )     Ganglion cyst of wrist, right     LAST ASSESSED: [id number]    Hepatitis C     dx 15 yrs ago-2015 retested told undetectable    Hepatitis C virus infection     LAST ASSESSED: [id number]    Hordeolum externum left eye, unspecified eyelid     LAST ASSESSED: [id number]    Pyelonephritis     pyelonephritis    Shingles     Stargardt's disease     Stargardts Disease     dx when 23years old    Tear of right scapholunate ligament     LAST ASSESSED: [id number]    Varicella     as child    Wears glasses     reading       Past Surgical History:   Procedure Laterality Date     SECTION      LAST ASSESSED: [id number]    INDUCED       SURIGCAL TREATMENT FOR     LIVER BIOPSY N/A     [place] ESOPHAGOGASTRODUODENOSCOPY TRANSORAL DIAGNOSTIC N/A 2016    Procedure: ESOPHAGOGASTRODUODENOSCOPY (EGD); Surgeon: Lexus Busby MD;  Location: [facility];   Service: Gastroenterology    WISDOM TOOTH EXTRACTION         No Known Allergies    Current Outpatient Medications on File Prior to Visit   Medication Sig Dispense Refill    acetaminophen (TYLENOL) 325 mg tablet Take 2 tablets (650 mg total) by mouth every 4 (four) hours as needed for mild pain (Patient not taking: Reported on 1/28/2021)  0    albuterol (Ventolin HFA) 90 mcg/act inhaler Inhale 2 puffs every 6 (six) hours as needed for wheezing or shortness of breath (Patient not taking: Reported on 1/28/2021) 18 g 0    buprenorphine-naloxone (SUBOXONE) 2-0 5 mg Place 2 Film under the tongue daily 60 Film 1    cyclobenzaprine (FLEXERIL) 5 mg tablet Take 1 tablet (5 mg total) by mouth 3 (three) times a day as needed (Neck pain) for up to 20 days (Patient not taking: Reported on 1/28/2021) 60 tablet 0    ibuprofen (MOTRIN) 600 mg tablet Take 1 tablet (600 mg total) by mouth every 6 (six) hours as needed for moderate pain (cramping) 30 tablet 0    mesalamine (PENTASA) 500 mg CR capsule Take 1 capsule (500 mg total) by mouth 3 (three) times a day 270 capsule 3    naloxone (NARCAN) 4 mg/0 1 mL nasal spray Administer 1 spray into a nostril  If no response after 2-3 minutes, give another dose in the other nostril using a new spray  (Patient not taking: Reported on 11/20/2020) 1 each 1    Prenatal Vit-Fe Fumarate-FA (PRENATAL 1+1 PO) Take by mouth      sertraline (ZOLOFT) 100 mg tablet Take 1 tablet daily with 50 mg tablet  (Patient taking differently: Take 100 mg by mouth daily Total of 125 mg daily) 90 tablet 1    sertraline (ZOLOFT) 25 mg tablet Take 1 tablet daily with 100 mg tablet  (Patient taking differently: Take 25 mg by mouth daily Total 125 mg daily) 90 tablet 1     No current facility-administered medications on file prior to visit          Social History     Tobacco Use    Smoking status: Current Every Day Smoker     Packs/day: 1 00     Years: 25 00     Pack years: 25 00     Types: Cigarettes    Smokeless tobacco: Never Used   Substance Use Topics    Alcohol use: No     Frequency: Never     Comment: former ETOH abuse-per pt  Drug use: No     Comment: former addict-per doctor PMH, H/O INTRAVENOUS DRUG USE IN REMISSION       Family History   Problem Relation Age of Onset    Heart attack Mother     Cancer Mother         LEIOMYOSARCOMA    Seizures Father         EPILEPSY    Stroke Father     Diabetes Brother     No Known Problems Daughter     No Known Problems Son     Hypertension Brother     No Known Problems Brother     Kidney disease Brother     Bipolar disorder Brother     Heart defect Daughter         ASD? Review of Systems     As stated in the HPI  All other systems were reviewed and are negative  Physical Exam     /82   Pulse 80   Temp 98 °F (36 7 °C)   Ht 5' 9" (1 753 m)   Wt 79 8 kg (176 lb)   BMI 25 99 kg/m²     GENERAL: This is a well-developed, well-nourished, age-appropriate patient in no acute distress  The patient is alert and oriented x3  Pleasant and cooperative  Eyes: Anicteric sclerae  Extraocular movements appear intact  HENT: Nares are patent with no drainage  Lungs: There is equal chest rise on inspection  Breathing is non-labored with no audible wheezing  Cardiovascular: No cyanosis  No upper extremity lymphadema  Skin: Skin is warm to touch  No obvious skin lesions or rashes other than described below  Neurologic: No ataxia  Psychiatric: Mood and affect are appropriate  Bilateral upper extremities:   Normal cervical ROM   Full wrist ROM   Full elbow ROM   - Spurling's negative   Active triggering  Actively correctable  , bilateral thumps   + A1 pulley tenderness bilateral thumbs   - tinel's proximal median nerve   + tinel's at the carpal tunnel   5/5 Motor to the APB, FDI, FDP2, FDP5, EDC  Sensation intact to light touch in the median, radial, and ulnar nerve distribution    2 point discrimination is 5 mm throughout all digits        Data Review     Results Reviewed     None             Imaging:  X-rays of bilateral wrists were personally reviewed by myself and demonstrates no acute fracture or dislocation  Assessment and Plan      Diagnoses and all orders for this visit:    Bilateral carpal tunnel syndrome         35 y o  female with bilateral carpal tunnel syndrome  We discussed the etiology and natural course of carpal tunnel syndrome  We discussed that carpal tunnel syndrome is related to increased pressure on the nerve in the carpal canal at the level of the wrist   Increasing pressure can be a result of wrist flexion or extension or changes to the contents of the carpal tunnel  We discussed that progression of this condition from mild to severe can result in numbness and tingling as well as dysfunction of the hand and even atrophy and weakness to the thumb musculature  Treatment options for this condition range from nonoperative to operative and the mainstays are nighttime splinting for nonoperative measures and carpal tunnel release for operative measures  Trial of nonoperative measures for around 6 weeks is typically beneficial prior to any surgical intervention and can help to avoid surgery  Surgical release is performed with a mini open approach and while it can be performed with local only or local and sedation, there are risks to the procedure that include bleeding, infection, damage to surrounding neurovascular structures, weakness, pillar pain, and persistence of symptoms  I also discussed with the patient that if carpal tunnel persists in both wrists that surgical intervention can be performed simultaneously and that recovery is expedited  Mirtha wishes to proceed with surgical intervention  Risks and benefits of a carpal tunnel release were discussed  Risks or a carpal tunnel release consist of but not limited to bleeding, infection, stiffness, pillar pain, injury to surrounding structures, injury to nerves, ETC  Right carpal tunnel release and left carpal tunnel release informed surgical consents were signed in the office today   Mirtha wishes to proceed with the left carpal tunnel release fist followed by the right carpal tunnel release aprox  10 days to 2 weeks later  The left carpal tunnel sutures may be removed in the OR at the time of the right carpal tunnel release  Mirtha will check with her OBGYN regarding Lidocaine with Epinephrine and breast feeding        Follow Up: 10-14 days after the second (right) carpal tunnel release     To Do Next Visit: suture removal     PROCEDURES PERFORMED:  Procedures  No Procedures performed today      Scribe Attestation    I,:  Doni Delaney am acting as a scribe while in the presence of the attending physician :       I,:  Aileen Walker MD personally performed the services described in this documentation    as scribed in my presence :

## 2021-03-09 NOTE — H&P (VIEW-ONLY)
Chief Complaint     Bilateral hand numbness/tingling       History of Present Illness     Mirtha Patel is a 35 y o  right hand dominant female who presents to the office today for bilateral hand numbness/tingling  Mirtha was referred to the office for consultation at the request of Doreen Orta  Mirtha states that she has been experiencing numbness and tingling to her radial 3 digits for aprox  6 weeks, after giving birth  She states her left hand is worse then her right  Mirtha notes weakness to her hands, specifically when opening jars  She also notes difficulty when buttoning the buttons on her sons vicky  She also states that she is dropping objects  Mirtha has been bracing at night as much as possible, which has provided with with partial improvement in her symptoms  Past Medical History:   Diagnosis Date    Arthritis     shoulders    [facility] [facility]     Depression     Drug abuse [facility] [address] [facility]     Ganglion cyst of wrist, right     LAST ASSESSED: [id number]    Hepatitis C     dx 15 yrs ago-2015 retested told undetectable    Hepatitis C virus infection     LAST ASSESSED: [id number]    Hordeolum externum left eye, unspecified eyelid     LAST ASSESSED: [id number]    Pyelonephritis     pyelonephritis    Shingles     Stargardt's disease     Stargardts Disease     dx when 23years old    Tear of right scapholunate ligament     LAST ASSESSED: [id number]    Varicella     as child    Wears glasses     reading       Past Surgical History:   Procedure Laterality Date     SECTION      LAST ASSESSED: [id number]    INDUCED       SURIGCAL TREATMENT FOR     LIVER BIOPSY N/A     LA ESOPHAGOGASTRODUODENOSCOPY TRANSORAL DIAGNOSTIC N/A 2016    Procedure: ESOPHAGOGASTRODUODENOSCOPY (EGD); Surgeon: Shasha Candelario MD;  Location: [facility];   Service: Gastroenterology    WISDOM TOOTH EXTRACTION         No Known Allergies    Current Outpatient Medications on File Prior to Visit   Medication Sig Dispense Refill    acetaminophen (TYLENOL) 325 mg tablet Take 2 tablets (650 mg total) by mouth every 4 (four) hours as needed for mild pain (Patient not taking: Reported on 1/28/2021)  0    albuterol (Ventolin HFA) 90 mcg/act inhaler Inhale 2 puffs every 6 (six) hours as needed for wheezing or shortness of breath (Patient not taking: Reported on 1/28/2021) 18 g 0    buprenorphine-naloxone (SUBOXONE) 2-0 5 mg Place 2 Film under the tongue daily 60 Film 1    cyclobenzaprine (FLEXERIL) 5 mg tablet Take 1 tablet (5 mg total) by mouth 3 (three) times a day as needed (Neck pain) for up to 20 days (Patient not taking: Reported on 1/28/2021) 60 tablet 0    ibuprofen (MOTRIN) 600 mg tablet Take 1 tablet (600 mg total) by mouth every 6 (six) hours as needed for moderate pain (cramping) 30 tablet 0    mesalamine (PENTASA) 500 mg CR capsule Take 1 capsule (500 mg total) by mouth 3 (three) times a day 270 capsule 3    naloxone (NARCAN) 4 mg/0 1 mL nasal spray Administer 1 spray into a nostril  If no response after 2-3 minutes, give another dose in the other nostril using a new spray  (Patient not taking: Reported on 11/20/2020) 1 each 1    Prenatal Vit-Fe Fumarate-FA (PRENATAL 1+1 PO) Take by mouth      sertraline (ZOLOFT) 100 mg tablet Take 1 tablet daily with 50 mg tablet  (Patient taking differently: Take 100 mg by mouth daily Total of 125 mg daily) 90 tablet 1    sertraline (ZOLOFT) 25 mg tablet Take 1 tablet daily with 100 mg tablet  (Patient taking differently: Take 25 mg by mouth daily Total 125 mg daily) 90 tablet 1     No current facility-administered medications on file prior to visit          Social History     Tobacco Use    Smoking status: Current Every Day Smoker     Packs/day: 1 00     Years: 25 00     Pack years: 25 00     Types: Cigarettes    Smokeless tobacco: Never Used   Substance Use Topics    Alcohol use: No     Frequency: Never     Comment: former ETOH abuse-per pt  Drug use: No     Comment: former addict-per doctor PMH, H/O INTRAVENOUS DRUG USE IN REMISSION       Family History   Problem Relation Age of Onset    Heart attack Mother     Cancer Mother         LEIOMYOSARCOMA    Seizures Father         EPILEPSY    Stroke Father     Diabetes Brother     No Known Problems Daughter     No Known Problems Son     Hypertension Brother     No Known Problems Brother     Kidney disease Brother     Bipolar disorder Brother     Heart defect Daughter         ASD? Review of Systems     As stated in the HPI  All other systems were reviewed and are negative  Physical Exam     /82   Pulse 80   Temp 98 °F (36 7 °C)   Ht 5' 9" (1 753 m)   Wt 79 8 kg (176 lb)   BMI 25 99 kg/m²     GENERAL: This is a well-developed, well-nourished, age-appropriate patient in no acute distress  The patient is alert and oriented x3  Pleasant and cooperative  Eyes: Anicteric sclerae  Extraocular movements appear intact  HENT: Nares are patent with no drainage  Lungs: There is equal chest rise on inspection  Breathing is non-labored with no audible wheezing  Cardiovascular: No cyanosis  No upper extremity lymphadema  Skin: Skin is warm to touch  No obvious skin lesions or rashes other than described below  Neurologic: No ataxia  Psychiatric: Mood and affect are appropriate  Bilateral upper extremities:   Normal cervical ROM   Full wrist ROM   Full elbow ROM   - Spurling's negative   Active triggering  Actively correctable  , bilateral thumps   + A1 pulley tenderness bilateral thumbs   - tinel's proximal median nerve   + tinel's at the carpal tunnel   5/5 Motor to the APB, FDI, FDP2, FDP5, EDC  Sensation intact to light touch in the median, radial, and ulnar nerve distribution    2 point discrimination is 5 mm throughout all digits        Data Review     Results Reviewed     None             Imaging:  X-rays of bilateral wrists were personally reviewed by myself and demonstrates no acute fracture or dislocation  Assessment and Plan      Diagnoses and all orders for this visit:    Bilateral carpal tunnel syndrome         35 y o  female with bilateral carpal tunnel syndrome  We discussed the etiology and natural course of carpal tunnel syndrome  We discussed that carpal tunnel syndrome is related to increased pressure on the nerve in the carpal canal at the level of the wrist   Increasing pressure can be a result of wrist flexion or extension or changes to the contents of the carpal tunnel  We discussed that progression of this condition from mild to severe can result in numbness and tingling as well as dysfunction of the hand and even atrophy and weakness to the thumb musculature  Treatment options for this condition range from nonoperative to operative and the mainstays are nighttime splinting for nonoperative measures and carpal tunnel release for operative measures  Trial of nonoperative measures for around 6 weeks is typically beneficial prior to any surgical intervention and can help to avoid surgery  Surgical release is performed with a mini open approach and while it can be performed with local only or local and sedation, there are risks to the procedure that include bleeding, infection, damage to surrounding neurovascular structures, weakness, pillar pain, and persistence of symptoms  I also discussed with the patient that if carpal tunnel persists in both wrists that surgical intervention can be performed simultaneously and that recovery is expedited  Mirtha wishes to proceed with surgical intervention  Risks and benefits of a carpal tunnel release were discussed  Risks or a carpal tunnel release consist of but not limited to bleeding, infection, stiffness, pillar pain, injury to surrounding structures, injury to nerves, ETC  Right carpal tunnel release and left carpal tunnel release informed surgical consents were signed in the office today   Mirtha wishes to proceed with the left carpal tunnel release fist followed by the right carpal tunnel release aprox  10 days to 2 weeks later  The left carpal tunnel sutures may be removed in the OR at the time of the right carpal tunnel release  Mirtha will check with her OBGYN regarding Lidocaine with Epinephrine and breast feeding        Follow Up: 10-14 days after the second (right) carpal tunnel release     To Do Next Visit: suture removal     PROCEDURES PERFORMED:  Procedures  No Procedures performed today      Scribe Attestation    I,:  Rodrigo Delaney am acting as a scribe while in the presence of the attending physician :       I,:  Cr Tenorio MD personally performed the services described in this documentation    as scribed in my presence :

## 2021-03-10 DIAGNOSIS — Z23 ENCOUNTER FOR IMMUNIZATION: ICD-10-CM

## 2021-03-11 ENCOUNTER — TELEPHONE (OUTPATIENT)
Dept: OBGYN CLINIC | Facility: CLINIC | Age: 41
End: 2021-03-11

## 2021-03-11 NOTE — TELEPHONE ENCOUNTER
Prior auth initiated via fax to [facility]  Faxed form, [place] [facility] and last office note

## 2021-03-12 ENCOUNTER — OFFICE VISIT (OUTPATIENT)
Dept: OBGYN CLINIC | Facility: CLINIC | Age: 41
End: 2021-03-12

## 2021-03-12 VITALS
SYSTOLIC BLOOD PRESSURE: 132 MMHG | DIASTOLIC BLOOD PRESSURE: 74 MMHG | HEIGHT: 69 IN | WEIGHT: 174 LBS | BODY MASS INDEX: 25.77 KG/M2

## 2021-03-12 DIAGNOSIS — Z30.09 CONSULTATION FOR FEMALE STERILIZATION: Primary | ICD-10-CM

## 2021-03-12 PROBLEM — O99.330 TOBACCO SMOKING AFFECTING PREGNANCY, ANTEPARTUM: Status: RESOLVED | Noted: 2020-07-08 | Resolved: 2021-03-12

## 2021-03-12 PROBLEM — O99.323 SUBOXONE MAINTENANCE TREATMENT COMPLICATING PREGNANCY, ANTEPARTUM, THIRD TRIMESTER (HCC): Status: RESOLVED | Noted: 2020-12-13 | Resolved: 2021-03-12

## 2021-03-12 PROBLEM — F11.20 SUBOXONE MAINTENANCE TREATMENT COMPLICATING PREGNANCY, ANTEPARTUM, THIRD TRIMESTER (HCC): Status: RESOLVED | Noted: 2020-12-13 | Resolved: 2021-03-12

## 2021-03-12 PROBLEM — O34.219 VAGINAL BIRTH AFTER CESAREAN: Status: RESOLVED | Noted: 2020-12-29 | Resolved: 2021-03-12

## 2021-03-12 PROCEDURE — PREOP: Performed by: OBSTETRICS & GYNECOLOGY

## 2021-03-12 NOTE — PROGRESS NOTES
Assessment/Plan:      Multiparity requesting permanent sterilization  Will proceed with laparoscopic bilateral tubal ligation /salpingectomy  H&P done,  Previously signed consents  reviewed       Problem List Items Addressed This Visit        Other    Consultation for female sterilization - Primary            Subjective:      Patient ID: Mirtha Patel is a 35 y o  female  Mirtha Is a 61-year-old  6 para 65 female presenting for consultation/ preoperative visit for permanent sterilization  The patient had been seen in her postpartum visit and consents were reviewed and signed for permanent sterilization  We have reviewed all options including long-acting contraceptives  Her menses are usually regular and not an issue  And she is indeed done with childbearing  We reviewed the laparoscopic technique as well as all risks and benefits associated with the laparoscopic sterilization as well as anticipated recovery  The following portions of the patient's history were reviewed and updated as appropriate:   She  has a past medical history of Arthritis, Crohn disease ([facility] [address] ), Depression, Drug abuse ([facility] [address] ), Ganglion cyst of wrist, right, Hepatitis C, Hepatitis C virus infection, Hordeolum externum left eye, unspecified eyelid, Pyelonephritis, Shingles, Stargardt's disease, Stargardts Disease, Tear of right scapholunate ligament, Varicella, and Wears glasses    She   Patient Active Problem List    Diagnosis Date Noted    Consultation for female sterilization 2021    Psychosocial stressors 10/19/2020    Dermatitis 2020    Neck pain 2019    Cigarette nicotine dependence without complication 84/10/2910    History of hepatitis C 2019    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, intellectual disability, and dysmorphic features ([facility] [address] ) 2019    Crohn's disease without complication ([facility] [address] [facility] )     Renal cyst 2017    Depression 2015    History of heroin abuse ([facility] [address] [facility] 2015    Hemorrhoid 2015     She  has a past surgical history that includes  section; pr esophagogastroduodenoscopy transoral diagnostic (N/A, 2016); Induced ; Liver biopsy (N/A, ); and [place] tooth extraction  Her family history includes Bipolar disorder in her brother; Cancer in her mother; Diabetes in her brother; Heart attack in her mother; Heart defect in her daughter; Hypertension in her brother; Kidney disease in her brother; No Known Problems in her brother, daughter, and son; Seizures in her father; Stroke in her father  She  reports that she has been smoking cigarettes  She has a 25 00 pack-year smoking history  She has never used smokeless tobacco  She reports that she does not drink alcohol or use drugs  Current Outpatient Medications   Medication Sig Dispense Refill    buprenorphine-naloxone (SUBOXONE) 2-0 5 mg Place 2 Film under the tongue daily 60 Film 1    ibuprofen (MOTRIN) 600 mg tablet Take 1 tablet (600 mg total) by mouth every 6 (six) hours as needed for moderate pain (cramping) 30 tablet 0    mesalamine (PENTASA) 500 mg CR capsule Take 1 capsule (500 mg total) by mouth 3 (three) times a day 270 capsule 3    sertraline (ZOLOFT) 100 mg tablet Take 1 tablet daily with 50 mg tablet  (Patient taking differently: Take 100 mg by mouth daily Total of 125 mg daily) 90 tablet 1    sertraline (ZOLOFT) 25 mg tablet Take 1 tablet daily with 100 mg tablet  (Patient taking differently: Take 25 mg by mouth daily Total 125 mg daily) 90 tablet 1    naloxone (NARCAN) 4 mg/0 1 mL nasal spray Administer 1 spray into a nostril  If no response after 2-3 minutes, give another dose in the other nostril using a new spray  (Patient not taking: Reported on 2020) 1 each 1    Prenatal Vit-Fe Fumarate-FA (PRENATAL 1+1 PO) Take by mouth       No current facility-administered medications for this visit        Current Outpatient Medications on File Prior to Visit   Medication Sig    buprenorphine-naloxone (SUBOXONE) 2-0 5 mg Place 2 Film under the tongue daily    ibuprofen (MOTRIN) 600 mg tablet Take 1 tablet (600 mg total) by mouth every 6 (six) hours as needed for moderate pain (cramping)    mesalamine (PENTASA) 500 mg CR capsule Take 1 capsule (500 mg total) by mouth 3 (three) times a day    sertraline (ZOLOFT) 100 mg tablet Take 1 tablet daily with 50 mg tablet  (Patient taking differently: Take 100 mg by mouth daily Total of 125 mg daily)    sertraline (ZOLOFT) 25 mg tablet Take 1 tablet daily with 100 mg tablet  (Patient taking differently: Take 25 mg by mouth daily Total 125 mg daily)    naloxone (NARCAN) 4 mg/0 1 mL nasal spray Administer 1 spray into a nostril  If no response after 2-3 minutes, give another dose in the other nostril using a new spray  (Patient not taking: Reported on 11/20/2020)    Prenatal Vit-Fe Fumarate-FA (PRENATAL 1+1 PO) Take by mouth    [DISCONTINUED] acetaminophen (TYLENOL) 325 mg tablet Take 2 tablets (650 mg total) by mouth every 4 (four) hours as needed for mild pain (Patient not taking: Reported on 1/28/2021)    [DISCONTINUED] albuterol (Ventolin HFA) 90 mcg/act inhaler Inhale 2 puffs every 6 (six) hours as needed for wheezing or shortness of breath (Patient not taking: Reported on 1/28/2021)    [DISCONTINUED] cyclobenzaprine (FLEXERIL) 5 mg tablet Take 1 tablet (5 mg total) by mouth 3 (three) times a day as needed (Neck pain) for up to 20 days (Patient not taking: Reported on 1/28/2021)     No current facility-administered medications on file prior to visit  She has No Known Allergies       Review of Systems   Constitutional: Negative for fatigue, fever and unexpected weight change  HENT: Negative for dental problem, mouth sores, nosebleeds, rhinorrhea, sinus pressure, sinus pain and sore throat  Eyes: Negative for pain, discharge and visual disturbance     Respiratory: Negative for cough, chest tightness, shortness of breath and wheezing  Cardiovascular: Negative for chest pain, palpitations and leg swelling  Gastrointestinal: Negative for blood in stool, constipation, diarrhea, nausea and vomiting  Endocrine: Negative for polydipsia  Genitourinary: Negative for difficulty urinating, dyspareunia, dysuria, menstrual problem, pelvic pain, urgency, vaginal discharge and vaginal pain  Musculoskeletal: Negative for arthralgias, back pain and joint swelling  Allergic/Immunologic: Negative for environmental allergies  Neurological: Negative for seizures, light-headedness and headaches  Hematological: Does not bruise/bleed easily  Psychiatric/Behavioral: Negative for sleep disturbance  The patient is not nervous/anxious  Objective:      /74 (BP Location: Left arm, Patient Position: Sitting, Cuff Size: Standard)   Ht 5' 9" (1 753 m)   Wt 78 9 kg (174 lb)   BMI 25 70 kg/m²          Physical Exam  Vitals signs reviewed  Constitutional:       General: She is not in acute distress  Appearance: Normal appearance  She is normal weight  She is not ill-appearing  Comments: Youthful white female    HENT:      Head: Normocephalic and atraumatic  Cardiovascular:      Rate and Rhythm: Normal rate and regular rhythm  Heart sounds: No murmur  Pulmonary:      Effort: Pulmonary effort is normal  No respiratory distress  Breath sounds: No wheezing  Abdominal:      General: Abdomen is flat  Palpations: Abdomen is soft  Neurological:      General: No focal deficit present  Mental Status: She is alert and oriented to person, place, and time     Psychiatric:         Mood and Affect: Mood normal          Behavior: Behavior normal        pelvic exam 1/28/2021

## 2021-03-15 ENCOUNTER — TELEMEDICINE (OUTPATIENT)
Dept: INTERNAL MEDICINE CLINIC | Facility: CLINIC | Age: 41
End: 2021-03-15
Payer: COMMERCIAL

## 2021-03-15 ENCOUNTER — IMMUNIZATIONS (OUTPATIENT)
Dept: FAMILY MEDICINE CLINIC | Facility: HOSPITAL | Age: 41
End: 2021-03-15

## 2021-03-15 VITALS — TEMPERATURE: 97.8 F | WEIGHT: 176 LBS | BODY MASS INDEX: 26.07 KG/M2 | HEIGHT: 69 IN

## 2021-03-15 DIAGNOSIS — F11.20 SUBOXONE MAINTENANCE TREATMENT COMPLICATING PREGNANCY, ANTEPARTUM, THIRD TRIMESTER (HCC): ICD-10-CM

## 2021-03-15 DIAGNOSIS — Z51.81 ENCOUNTER FOR MONITORING SUBOXONE MAINTENANCE THERAPY: Primary | ICD-10-CM

## 2021-03-15 DIAGNOSIS — O99.323 SUBOXONE MAINTENANCE TREATMENT COMPLICATING PREGNANCY, ANTEPARTUM, THIRD TRIMESTER (HCC): ICD-10-CM

## 2021-03-15 DIAGNOSIS — Z23 ENCOUNTER FOR IMMUNIZATION: Primary | ICD-10-CM

## 2021-03-15 DIAGNOSIS — Z79.899 ENCOUNTER FOR MONITORING SUBOXONE MAINTENANCE THERAPY: Primary | ICD-10-CM

## 2021-03-15 PROBLEM — Z79.891 ENCOUNTER FOR MONITORING SUBOXONE MAINTENANCE THERAPY: Status: ACTIVE | Noted: 2021-03-15

## 2021-03-15 PROCEDURE — 3008F BODY MASS INDEX DOCD: CPT | Performed by: INTERNAL MEDICINE

## 2021-03-15 PROCEDURE — 3725F SCREEN DEPRESSION PERFORMED: CPT | Performed by: INTERNAL MEDICINE

## 2021-03-15 PROCEDURE — 99213 OFFICE O/P EST LOW 20 MIN: CPT | Performed by: INTERNAL MEDICINE

## 2021-03-15 PROCEDURE — 91301 SARS-COV-2 / COVID-19 MRNA VACCINE (MODERNA) 100 MCG: CPT

## 2021-03-15 PROCEDURE — 0011A SARS-COV-2 / COVID-19 MRNA VACCINE (MODERNA) 100 MCG: CPT

## 2021-03-15 RX ORDER — BUPRENORPHINE AND NALOXONE 4; 1 MG/1; MG/1
4 FILM, SOLUBLE BUCCAL; SUBLINGUAL DAILY
Qty: 30 FILM | Refills: 1 | Status: SHIPPED | OUTPATIENT
Start: 2021-03-15 | End: 2021-04-15 | Stop reason: SDUPTHER

## 2021-03-15 NOTE — PROGRESS NOTES
Virtual Regular Visit      Assessment/Plan:    Problem List Items Addressed This Visit        Other    Encounter for monitoring Suboxone maintenance therapy - Primary      Other Visit Diagnoses     Suboxone maintenance treatment complicating pregnancy, antepartum, third trimester ([facility] [address] )        Relevant Medications    buprenorphine-naloxone (Suboxone) 4-1 MG               Reason for visit is   Chief Complaint   Patient presents with    Medication Management     Monthly Suboxone appt  Encounter provider Scarlett Almanza MD    Provider located at [address] [address]      Recent Visits  No visits were found meeting these conditions  Showing recent visits within past 7 days and meeting all other requirements     Today's Visits  Date Type Provider Dept   03/15/21 Telemedicine Scarlett Almanza  [facility] today's visits and meeting all other requirements     Future Appointments  No visits were found meeting these conditions  Showing future appointments within next 150 days and meeting all other requirements        The patient was identified by name and date of birth  Mirtha Patel was informed that this is a telemedicine visit and that the visit is being conducted through [facility] and patient was informed that this is a secure, HIPAA-compliant platform  She agrees to proceed     My office door was closed  No one else was in the room  She acknowledged consent and understanding of privacy and security of the video platform  The patient has agreed to participate and understands they can discontinue the visit at any time  Patient is aware this is a billable service  Subjective  Mirtha Patel is a 35 y o  female   Seen via virtual visit for Suboxone follow-up and monitoring          At her last visit she requested a dose increase because of persistent cravings and a feeling that the 2 mg was not enough to keep mild withdrawal symptoms at [facility]  Accordingly, the patient felt she was in danger of relapse and possibly picking up so she asked for an increase in dose to 4 mg  She feels very well and comfortable on the 4 mg dose and would like to continue at this dose  I have no objection  Her pre visit checklist did not reveal any symptoms of concern for withdrawal        Past Medical History:   Diagnosis Date    Arthritis     shoulders    [facility] [address] [facility]     Depression     Drug abuse ([facility] [address] )     Ganglion cyst of wrist, right     LAST ASSESSED: [id number]    Hepatitis C     dx 15 yrs ago-2015 retested told undetectable    Hepatitis C virus infection     LAST ASSESSED: [id number]    Hordeolum externum left eye, unspecified eyelid     LAST ASSESSED: [id number]    Pyelonephritis     pyelonephritis    Shingles     Stargardt's disease     Stargardts Disease     dx when 23years old    Tear of right scapholunate ligament     LAST ASSESSED: [id number]    Varicella     as child    Wears glasses     reading       Past Surgical History:   Procedure Laterality Date     SECTION      LAST ASSESSED: [id number]    INDUCED       SURIGCAL TREATMENT FOR     LIVER BIOPSY N/A     CA ESOPHAGOGASTRODUODENOSCOPY TRANSORAL DIAGNOSTIC N/A 2016    Procedure: ESOPHAGOGASTRODUODENOSCOPY (EGD); Surgeon: Hair Wyatt MD;  Location: [facility];   Service: Gastroenterology    WISDOM TOOTH EXTRACTION         Current Outpatient Medications   Medication Sig Dispense Refill    buprenorphine-naloxone (Suboxone) 4-1 MG Place 1 Film (4 mg total) under the tongue daily 30 Film 1    ibuprofen (MOTRIN) 600 mg tablet Take 1 tablet (600 mg total) by mouth every 6 (six) hours as needed for moderate pain (cramping) 30 tablet 0    mesalamine (PENTASA) 500 mg CR capsule Take 1 capsule (500 mg total) by mouth 3 (three) times a day 270 capsule 3    naloxone (NARCAN) 4 mg/0 1 mL nasal spray Administer 1 spray into a nostril  If no response after 2-3 minutes, give another dose in the other nostril using a new spray  1 each 1    sertraline (ZOLOFT) 100 mg tablet Take 1 tablet daily with 50 mg tablet  (Patient taking differently: Take 100 mg by mouth daily Total of 125 mg daily) 90 tablet 1    sertraline (ZOLOFT) 25 mg tablet Take 1 tablet daily with 100 mg tablet  (Patient taking differently: Take 25 mg by mouth daily Total 125 mg daily) 90 tablet 1    Prenatal Vit-Fe Fumarate-FA (PRENATAL 1+1 PO) Take by mouth       No current facility-administered medications for this visit  No Known Allergies    Review of Systems   Constitutional: Negative  HENT: Negative  Eyes: Negative  Respiratory: Negative  Cardiovascular: Negative  Gastrointestinal: Positive for nausea (Mild but improved)  Endocrine: Negative  Genitourinary: Negative  Musculoskeletal: Negative  Skin: Negative  Neurological: Negative  Hematological: Negative  Psychiatric/Behavioral: Negative  Video Exam    Vitals:    03/15/21 1339   Temp: 97 8 °F (36 6 °C)   TempSrc: Temporal   Weight: 79 8 kg (176 lb)   Height: 5' 9" (1 753 m)       Physical Exam  Vitals signs reviewed  Constitutional:       General: She is not in acute distress  Appearance: Normal appearance  She is not ill-appearing, toxic-appearing or diaphoretic  HENT:      Head: Normocephalic and atraumatic  Right Ear: External ear normal       Left Ear: External ear normal       Nose: Nose normal    Eyes:      General: No scleral icterus  Conjunctiva/sclera: Conjunctivae normal       Pupils: Pupils are equal, round, and reactive to light  Neck:      Musculoskeletal: Neck supple  No muscular tenderness  Cardiovascular:      Rate and Rhythm: Normal rate  Pulmonary:      Effort: Pulmonary effort is normal  No respiratory distress  Abdominal:      General: Abdomen is flat  There is no distension  Tenderness: There is no abdominal tenderness  Musculoskeletal:         General: No swelling or tenderness  Right lower leg: No edema  Left lower leg: No edema  Skin:     General: Skin is warm  Coloration: Skin is not jaundiced  Findings: No bruising, erythema or rash  Neurological:      General: No focal deficit present  Mental Status: She is alert and oriented to person, place, and time  Mental status is at baseline  Cranial Nerves: No cranial nerve deficit  Motor: No weakness  Coordination: Coordination normal       Gait: Gait normal       Deep Tendon Reflexes: Reflexes normal    Psychiatric:         Mood and Affect: Mood normal          Behavior: Behavior normal          Thought Content: Thought content normal          Judgment: Judgment normal           I spent 15 minutes directly with the patient during this visit      VIRTUAL VISIT DISCLAIMER    Mirtha Patel acknowledges that she has consented to an online visit or consultation  She understands that the online visit is based solely on information provided by her, and that, in the absence of a face-to-face physical evaluation by the physician, the diagnosis she receives is both limited and provisional in terms of accuracy and completeness  This is not intended to replace a full medical face-to-face evaluation by the physician  Mirtha Patel understands and accepts these terms

## 2021-03-23 ENCOUNTER — TELEPHONE (OUTPATIENT)
Dept: OBGYN CLINIC | Facility: CLINIC | Age: 41
End: 2021-03-23

## 2021-03-24 ENCOUNTER — TELEPHONE (OUTPATIENT)
Dept: OBGYN CLINIC | Facility: CLINIC | Age: 41
End: 2021-03-24

## 2021-03-24 NOTE — TELEPHONE ENCOUNTER
Pt called back to r/s surgery  States Mondays are best for her  Aware will review schedule with OR and call her back

## 2021-03-26 NOTE — TELEPHONE ENCOUNTER
Reviewed with patient, date works for her   Aware will mail new information and new pre and post ops scheduled

## 2021-03-30 NOTE — PRE-PROCEDURE INSTRUCTIONS
Pre-Surgery Instructions:   Medication Instructions    buprenorphine-naloxone (Suboxone) 4-1 MG Patient was instructed by Physician and understands   ibuprofen (MOTRIN) 600 mg tablet Patient was instructed by Physician and understands   mesalamine (PENTASA) 500 mg CR capsule Patient was instructed by Physician and understands   naloxone (NARCAN) 4 mg/0 1 mL nasal spray Patient was instructed by Physician and understands   sertraline (ZOLOFT) 100 mg tablet Patient was instructed by Physician and understands   sertraline (ZOLOFT) 25 mg tablet Patient was instructed by Physician and understands  Pre shower with hibiclens as instructed by surgeon  You may drive yourself to the hospital   You may take your medications day of surgery  You may eat on the day of your surgery  You may have a dressing, please wear something that will fit over it

## 2021-04-02 ENCOUNTER — HOSPITAL ENCOUNTER (OUTPATIENT)
Facility: HOSPITAL | Age: 41
Setting detail: OUTPATIENT SURGERY
Discharge: HOME | End: 2021-04-02
Attending: ORTHOPAEDIC SURGERY | Admitting: ORTHOPAEDIC SURGERY
Payer: COMMERCIAL

## 2021-04-02 ENCOUNTER — TELEPHONE (OUTPATIENT)
Dept: POSTPARTUM | Facility: CLINIC | Age: 41
End: 2021-04-02

## 2021-04-02 VITALS
HEART RATE: 87 BPM | RESPIRATION RATE: 20 BRPM | TEMPERATURE: 98.9 F | DIASTOLIC BLOOD PRESSURE: 71 MMHG | SYSTOLIC BLOOD PRESSURE: 127 MMHG | OXYGEN SATURATION: 95 %

## 2021-04-02 DIAGNOSIS — G56.00 CARPAL TUNNEL SYNDROME, UNSPECIFIED LATERALITY: Primary | ICD-10-CM

## 2021-04-02 DIAGNOSIS — Z98.890 S/P CARPAL TUNNEL RELEASE: Primary | ICD-10-CM

## 2021-04-02 PROCEDURE — 64721 CARPAL TUNNEL SURGERY: CPT | Performed by: ORTHOPAEDIC SURGERY

## 2021-04-02 RX ORDER — OXYCODONE HYDROCHLORIDE 5 MG/1
5 TABLET ORAL EVERY 6 HOURS PRN
Qty: 5 TABLET | Refills: 0 | Status: SHIPPED | OUTPATIENT
Start: 2021-04-02 | End: 2021-04-02 | Stop reason: HOSPADM

## 2021-04-02 RX ORDER — MAGNESIUM HYDROXIDE 1200 MG/15ML
LIQUID ORAL AS NEEDED
Status: DISCONTINUED | OUTPATIENT
Start: 2021-04-02 | End: 2021-04-02 | Stop reason: HOSPADM

## 2021-04-02 RX ORDER — KETOROLAC TROMETHAMINE 10 MG/1
10 TABLET, FILM COATED ORAL EVERY 6 HOURS PRN
Qty: 20 TABLET | Refills: 0 | Status: SHIPPED | OUTPATIENT
Start: 2021-04-02 | End: 2021-04-15 | Stop reason: ALTCHOICE

## 2021-04-02 NOTE — DISCHARGE INSTRUCTIONS
[facility] - Dr Chelsey Kenney (Orthopedic Surgery)    Follow-up Appointments   Please call to set up/confirm your first postoperative visit with Dr Rocco Peterson in 10-14 days  Dressing and Wound Care   A dressing has been placed on your hand/arm to keep the incisions clean  Keep your dressing clean and dry      o You may remove the dressing 5 days after surgery  Once the dressing is off, your incision can get wet while showering/bathing only  Do not soak the wound (in bath water, pool, lake, etc )  Otherwise, keep your incision covered with Band-Aids or light dressing  Keep it dry and do not apply any ointments   Wear a plastic bag over your dressing/splint whenever you take a shower or bath until you are allowed to remove it   Swelling is normal after surgery  Elevate your hand/arm so the surgical site is above your heart to decrease the swelling  Swelling is like water, it runs downhill  This is especially important for the first 72 hours after surgery  o The best way to elevate your hand/arm is with your fingers pointing towards the ceiling and your hand/arm above the level of the heart   o You can use pillows to help prop your hand/arm up when sitting or lying down   If you are experiencing pain, be sure you are elevating your hand/arm as often as possible   Apply an ice pack over your dressing/splint for 20 minutes of every hour for the first 3 days when you are awake  This can help to reduce swelling and inflammation  Be sure the ice pack is waterproof so it does not leak on the dressing/splint  A simple ice pack can be made by adding ten cubes and a small amount of water in a small zip-lock bag  Seal this small bag tightly  Place this small bag in a larger zip lock bag  Apply to the area in pain   If the dressing feels too tight in spite of elevation, loosen the outer wrap but do not remove the entire dressing     If you have exposed pins/wires, take clean gauze or a cotton tip applicator (like a Q-tip), get it wet in clean warm water mixed with non-scented hand soap (like Lyn[place]ay, [place], Dial, etc ), and gently wipe around the base of the pin where it comes through the skin once a day  Do not use water from a well to clean as this has bacteria in it and can contribute to infections  ACTIVITIES:  [facility] and straighten the parts of your hand, wrist, elbow, and shoulder that are not included in your surgical dressing or splint  Do this at least 6 times a day, as this will help decrease swelling and speed up your recovery  This includes your fingers  See the instructions listed below for finger motion  THIS IS VERY IMPORTANT FOR YOUR RECOVERY! POSTOPERATIVE CARE/CONCERNS:  Tod Linn You may experience some temporary numbness in your fingers   You should have very little to no bleeding on your dressing   Notify the office (see contact info at bottom of page) for any of the following:  o Excessive pain not relieved by rest, elevation, and pain medications  o Feeling that the dressing is too tight in spite of adequately elevating hand/arm  o Active bleeding through the dressing  o Drainage from the wound site or pin sites  o Foul odor from the dressing/wound  o Temperature greater that 101? F or chills  o Blue or excessively cold fingertips  o Numbness of the fingertips that does not improve in spite of adequately elevating hand/arm    PAIN MEDICATION:   Pain is a normal part of the recovery after surgery  The pain medication provided to you will help to decrease the discomfort but will not completely eliminate the pain   A prescription for a narcotic pain medicine (oxycodone or hydrocodone) and anti-inflammatory (naproxen) were called in to your pharmacy  Please take the anti-inflammatory medication AND over-the-counter acetaminophen (Tylenol) regularly  The instructions will be listed on the bottles   The narcotic pain medicine should be used for pain that is not controlled by these other medications and only for the first few days after surgery  The narcotic is HIGHLY ADDICTIVE and has many side effects such as causing constipation, dizziness, confusion, decreased breathing and more  It is safe to take for a short period of time after surgery  It is almost never prescribed for longer than a few weeks and never after one month for elective surgeries   Do not take narcotic or anti-inflammatories on an empty stomach   It is illegal to drive while taking narcotic pain medication   Your pain should decrease over the first few days after surgery which will allow you to take less pain medicine, increase the time between doses of medication, or stop taking all pain medicine        OFFICE CONTACT NUMBERS   Please call [phone] with any questions about appointments or any medical concerns

## 2021-04-02 NOTE — OP NOTE
DATE OF SURGERY: 4/2/2021    SURGEON: Zach Chowdary MD    PREOPERATIVE DIAGNOSIS:  Left carpal tunnel syndrome    POSTOPERATIVE DIAGNOSIS:  Same    PROCEDURE:  Left carpal tunnel release, mini open    IMPLANTS: * No implants in log *     ASSISTANTS: JANE Jules    ANESTHESIA: Local    ESTIMATED BLOOD LOSS: Minimal    INTRAVENOUS FLUIDS: None    URINE OUTPUT: None    TOURNIQUET TIME: None    COMPLICATIONS:  None    ANTIBIOTICS: None    SPECIMENS: * No specimens in log *         INDICATIONS: Mirtha Patel is a 35y o  year old female who sustained the above conditions  The indications for operative intervention were carpal tunnel syndrome refractory to nonoperative modalities  The alternatives to operative intervention included nonoperative care  The risks of the operative procedure were discussed in detail with the patient including but not limited to the risks of anesthesia, infection, injury to blood vessel/nerve, pain, stiffness, changes in sensation, and the need for repeat surgery  The patient understood these risks and alternatives and elected to proceed with surgery  DESCRIPTION OF PROCEDURE: The patient was seen in the preoperative holding area and identification was performed as per the institution's protocol  1% lidocaine with epinephrine was infused into the operative site  The patient was then brought to the operating room and a briefing was held  A tourniquet was not used  The site was pre-scrubbed with chlorhexidine and prepped with chlorohexidine and draped in the usual sterile fashion  Following a timeout procedure, an incision starting distally at the intersection of Juárez's cardinal line at the intersection of the longitudinal line drawn on the radial border of the 4th ray was made on the left hand  This was carried about 1 in proximally  Dissection was carried down through skin and subcutaneous tissue sweeping the fat away from the palmar fascia    The palmar fascia was incised longitudinally and retractors were placed to identify the transverse carpal ligament  Gentle sweeping of any muscle present revealed the transverse carpal ligament  This was incised using a knife from the proximal portion of the incision distally until 2 mm past the distal palmar fat  Distal release was confirmed and then retractors were placed proximally  A small subcutaneous pocket was then made superficial to the remaining transverse carpal ligament  sled and knife were then used to then incise the transverse carpal ligament proximally into the antebrachial fascia  Care was taken to preserve the contents of the carpal tunnel which were deep  Full release was confirmed both visually and by feel  The wounds were copiously irrigated and closed in layers including 4-0 nylon  Sterile dressing was then applied    All sharps and sponge counts were correct and there were no complications  I attest that I, Aleks Sanabria, was present and scrubbed for the entirety of the procedure  No qualified resident was available to assist with this case  The patient was awoken from anesthesia in good condition and taken to the PACU         PLAN: The patient will follow up in 10-14 days

## 2021-04-02 NOTE — TELEPHONE ENCOUNTER
I reviewed with Mirtha the [facility] [place] [facility]) if this medication and the available research  I reviewed with her what to monitor for in her infant if she chooses to take this medication

## 2021-04-02 NOTE — TELEPHONE ENCOUNTER
Mirtha called - she had carpel tunnel surgery today - doc gave her rx for Toradol & she is asking if safe to take while nursing her 3mth old    She hasn't taken yet - but the pain is starting to get intense since the anesthesia is wearing off

## 2021-04-08 ENCOUNTER — TELEPHONE (OUTPATIENT)
Dept: OBGYN CLINIC | Facility: HOSPITAL | Age: 41
End: 2021-04-08

## 2021-04-08 NOTE — TELEPHONE ENCOUNTER
Patient Sees Dr Marcell Viera    Patient called, she is still in pain & has numbness after her surgery, she would like to cancel her 2nd surgery on 04/16, she also has a 4 month old at home so it is hard to elevate & ice her L Wrist  She picked up a gallon of milk and her hand is worse      Chrystal Aiken -[phone]

## 2021-04-08 NOTE — TELEPHONE ENCOUNTER
Spoke to patient - advised a gallon of milk is decently heavy and not surprising the hand reacted to it  Discussed normal to have pain in the palm 2-6 weeks on average and when it comes to anything weighted, start small and gradually build up  Can use nsaids, ice and elevation  Encouraged to refrain from heavy lifting but make sure to work on rom  Pt still interested in postponing surgery for other side  Can we please call her to make a post op appt for next week?

## 2021-04-12 ENCOUNTER — ANESTHESIA EVENT (OUTPATIENT)
Dept: PERIOP | Facility: AMBULARY SURGERY CENTER | Age: 41
End: 2021-04-12
Payer: COMMERCIAL

## 2021-04-12 ENCOUNTER — IMMUNIZATIONS (OUTPATIENT)
Dept: FAMILY MEDICINE CLINIC | Facility: HOSPITAL | Age: 41
End: 2021-04-12

## 2021-04-12 DIAGNOSIS — Z23 ENCOUNTER FOR IMMUNIZATION: Primary | ICD-10-CM

## 2021-04-12 PROCEDURE — 0012A SARS-COV-2 / COVID-19 MRNA VACCINE (MODERNA) 100 MCG: CPT

## 2021-04-12 PROCEDURE — 91301 SARS-COV-2 / COVID-19 MRNA VACCINE (MODERNA) 100 MCG: CPT

## 2021-04-14 ENCOUNTER — OFFICE VISIT (OUTPATIENT)
Dept: OBGYN CLINIC | Facility: MEDICAL CENTER | Age: 41
End: 2021-04-14

## 2021-04-14 VITALS
DIASTOLIC BLOOD PRESSURE: 79 MMHG | SYSTOLIC BLOOD PRESSURE: 134 MMHG | HEART RATE: 89 BPM | WEIGHT: 176 LBS | HEIGHT: 69 IN | BODY MASS INDEX: 26.07 KG/M2

## 2021-04-14 DIAGNOSIS — Z98.890 S/P CARPAL TUNNEL RELEASE: Primary | ICD-10-CM

## 2021-04-14 PROCEDURE — 99024 POSTOP FOLLOW-UP VISIT: CPT | Performed by: ORTHOPAEDIC SURGERY

## 2021-04-14 NOTE — PROGRESS NOTES
History of the Present Illness     Mirtha Patel is a 35 y o  female presents to the office today s/p left carpal tunnel release performed on 4/02/2021  Patient stated that she has been doing well since surgery  She stated that the numbness no longer wakes her up from sleep at night  She stated that she still experiences some numbness at the finger tips that she had prior to surgery  Patient stated that she was initially taking Toradol and now takes ibuprofen as needed  Physical Exam     /79   Pulse 89   Ht 5' 9" (1 753 m)   Wt 79 8 kg (176 lb)   LMP 03/26/2021   BMI 25 99 kg/m²     Left Wrist:   Incision dry, intact  No obvious swelling   No erythema or ecchymosis   Sensation intact to light touch in the median, radial, and ulnar nerve distribution  Brisk capillary refill   Distal palpable distal radial pulse         Data Review       Imaging:  None today     Assessment and Plan       35 y o  female s/p left carpal tunnel release performed on 4/02/2021  Patient had her sutures removed in office today, which she tolerated well  I discussed with the patient that she may perform a scar massage with lotion at this point to break up adhesions  I discussed that the numbness in the finger tips may take up to 6 months to resolve due to her having this symptom before the surgery  Patient stated that she would like to wait to have a carpal tunnel release done on the right hand at this time and will call the office when she wants to proceed with this           Follow Up: when she would like to schedule carpal tunnel release for the right     To Do Next Visit: discuss right carpal tunnel release  And use of Toradol postoperatively instead of any narcotic    PROCEDURES PERFORMED:  No Procedures performed today        Scribe Attestation    I,:  Russ Brooks am acting as a scribe while in the presence of the attending physician :       I,:  Fauzia Rich MD personally performed the services described in this documentation    as scribed in my presence :

## 2021-04-15 ENCOUNTER — OFFICE VISIT (OUTPATIENT)
Dept: INTERNAL MEDICINE CLINIC | Facility: CLINIC | Age: 41
End: 2021-04-15
Payer: COMMERCIAL

## 2021-04-15 VITALS
BODY MASS INDEX: 25.99 KG/M2 | RESPIRATION RATE: 18 BRPM | DIASTOLIC BLOOD PRESSURE: 96 MMHG | OXYGEN SATURATION: 96 % | TEMPERATURE: 97.4 F | SYSTOLIC BLOOD PRESSURE: 148 MMHG | HEART RATE: 128 BPM | HEIGHT: 69 IN

## 2021-04-15 DIAGNOSIS — O99.323 SUBOXONE MAINTENANCE TREATMENT COMPLICATING PREGNANCY, ANTEPARTUM, THIRD TRIMESTER (HCC): ICD-10-CM

## 2021-04-15 DIAGNOSIS — Z79.899 ENCOUNTER FOR MONITORING SUBOXONE MAINTENANCE THERAPY: Primary | ICD-10-CM

## 2021-04-15 DIAGNOSIS — Z51.81 ENCOUNTER FOR MONITORING SUBOXONE MAINTENANCE THERAPY: Primary | ICD-10-CM

## 2021-04-15 DIAGNOSIS — K50.10 CROHN'S DISEASE OF LARGE INTESTINE WITHOUT COMPLICATION (HCC): ICD-10-CM

## 2021-04-15 DIAGNOSIS — F11.20 SUBOXONE MAINTENANCE TREATMENT COMPLICATING PREGNANCY, ANTEPARTUM, THIRD TRIMESTER (HCC): ICD-10-CM

## 2021-04-15 PROCEDURE — 99213 OFFICE O/P EST LOW 20 MIN: CPT | Performed by: INTERNAL MEDICINE

## 2021-04-15 RX ORDER — BUPRENORPHINE AND NALOXONE 2; .5 MG/1; MG/1
6 FILM, SOLUBLE BUCCAL; SUBLINGUAL DAILY
Qty: 90 FILM | Refills: 1 | Status: SHIPPED | OUTPATIENT
Start: 2021-04-15 | End: 2021-05-13 | Stop reason: SDUPTHER

## 2021-04-15 NOTE — ASSESSMENT & PLAN NOTE
Patient is stable and doing well on current dose although she feels that she might be a bit more comfortable with an increase to 6 mg daily

## 2021-04-15 NOTE — PROGRESS NOTES
Assessment/Plan:    Encounter for monitoring Suboxone maintenance therapy    Patient is stable and doing well on current dose although she feels that she might be a bit more comfortable with an increase to 6 mg daily  [facility] without complication ([facility] )    Asymptomatic on mesalamine       Diagnoses and all orders for this visit:    Encounter for monitoring Suboxone maintenance therapy    Suboxone maintenance treatment complicating pregnancy, antepartum, third trimester ([facility] )  -     buprenorphine-naloxone (Suboxone) 2-0 5 mg; Place 3 Film (6 mg total) under the tongue daily    Crohn's disease of large intestine without complication (HCC)          Subjective:      Patient ID: Mirtha Patel is a 35 y o  female  Patient presents for follow-up visit for Suboxone maintenance and monitoring  She is doing well in general but sometimes she finds that the 4 mg film iIs not sufficient on some days and is requesting an increase in dose to 6 mg  She does rate her level of withdrawal and is 0 on a scale of 0 to 4  Her Crohn's disease is been under good control with the use of mesalamine  Family History   Problem Relation Age of Onset    Heart attack Mother     Cancer Mother         LEIOMYOSARCOMA    Seizures Father         EPILEPSY    Stroke Father     Diabetes Brother     No Known Problems Daughter     No Known Problems Son     Hypertension Brother     No Known Problems Brother     Kidney disease Brother     Bipolar disorder Brother     Heart defect Daughter         ASD?      Social History     Socioeconomic History    Marital status:      Spouse name: Not on file    Number of children: Not on file    Years of education: Not on file    Highest education level: Not on file   Occupational History    Not on file   Social Needs    Financial resource strain: Not on file    Food insecurity     Worry: Not on file     Inability: Not on file    Transportation needs     Medical: Not on file     Non-medical: Not on file   Tobacco Use    Smoking status: Current Every Day Smoker     Packs/day: 1 00     Types: Cigarettes    Smokeless tobacco: Never Used    Tobacco comment: encouraged smoking cessation   Substance and Sexual Activity    Alcohol use: No     Frequency: Never     Comment: former ETOH abuse-per pt    Drug use: No     Comment: former addict-per doctor PMH, H/O INTRAVENOUS DRUG USE IN REMISSION    Sexual activity: Yes     Partners: Male   Lifestyle    Physical activity     Days per week: Not on file     Minutes per session: Not on file    Stress: Not on file   Relationships    Social connections     Talks on phone: Not on file     Gets together: Not on file     Attends Yazdanism service: Not on file     Active member of club or organization: Not on file     Attends meetings of clubs or organizations: Not on file     Relationship status: Not on file    Intimate partner violence     Fear of current or ex partner: Not on file     Emotionally abused: Not on file     Physically abused: Not on file     Forced sexual activity: Not on file   Other Topics Concern    Not on file   Social History Narrative    DAILY [employer], 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     Past Medical History:   Diagnosis Date    Arthritis     shoulders    Crohn disease ([facility] )     Depression     Drug abuse ([facility] )     Ganglion cyst of wrist, right     LAST ASSESSED: [id number]    Hepatitis C     dx 15 yrs ago-2015 retested told undetectable    Hepatitis C virus infection     LAST ASSESSED: [id number]    Hordeolum externum left eye, unspecified eyelid     LAST ASSESSED: [id number]    Pyelonephritis     pyelonephritis    Shingles     Stargardt's disease     Stargardts Disease     dx when 23years old    Tear of right scapholunate ligament     LAST ASSESSED: [id number]    Varicella     as child    Wears glasses     reading       Current Outpatient Medications:     buprenorphine-naloxone (Suboxone) 2-0 5 mg, Place 3 Film (6 mg total) under the tongue daily, Disp: 90 Film, Rfl: 1    ibuprofen (MOTRIN) 600 mg tablet, Take 1 tablet (600 mg total) by mouth every 6 (six) hours as needed for moderate pain (cramping), Disp: 30 tablet, Rfl: 0    mesalamine (PENTASA) 500 mg CR capsule, Take 1 capsule (500 mg total) by mouth 3 (three) times a day, Disp: 270 capsule, Rfl: 3    naloxone (NARCAN) 4 mg/0 1 mL nasal spray, Administer 1 spray into a nostril  If no response after 2-3 minutes, give another dose in the other nostril using a new spray , Disp: 1 each, Rfl: 1    sertraline (ZOLOFT) 100 mg tablet, Take 1 tablet daily with 50 mg tablet  (Patient taking differently: Take 100 mg by mouth daily Total of 125 mg daily), Disp: 90 tablet, Rfl: 1    sertraline (ZOLOFT) 25 mg tablet, Take 1 tablet daily with 100 mg tablet  (Patient taking differently: Take 25 mg by mouth daily Total 125 mg daily), Disp: 90 tablet, Rfl: 1  No Known Allergies  Past Surgical History:   Procedure Laterality Date     SECTION      LAST ASSESSED: [id number]    INDUCED       SURIGCAL TREATMENT FOR     LIVER BIOPSY N/A     DE ESOPHAGOGASTRODUODENOSCOPY TRANSORAL DIAGNOSTIC N/A 2016    Procedure: ESOPHAGOGASTRODUODENOSCOPY (EGD); Surgeon: Paige Tong MD;  Location: [facility]; Service: Gastroenterology    DE REVISE MEDIAN N/CARPAL TUNNEL SURG Left 2021    Procedure: CARPAL TUNNEL RELEASE;  Surgeon: Chen Sanchez MD;  Location: [facility];  Service: Orthopedics    WISDOM TOOTH EXTRACTION           Review of Systems   Constitutional: Negative  HENT: Negative  Eyes: Negative  Respiratory: Negative  Cardiovascular: Negative  Genitourinary: Negative  Musculoskeletal: Positive for joint swelling (Recent left carpal tunnel surgery)  Skin: Negative  Neurological: Negative  Hematological: Negative  Psychiatric/Behavioral: Negative            Objective:      /96 (BP Location: Left arm, Patient Position: Standing, Cuff Size: Standard)   Pulse (!) 128   Temp (!) 97 4 °F (36 3 °C) (Tympanic)   Resp 18   Ht 5' 9" (1 753 m)   LMP 03/26/2021   SpO2 96%   BMI 25 99 kg/m²   BMI Counseling: Body mass index is 25 99 kg/m²  The BMI is above normal  Nutrition recommendations include reducing portion sizes, 3-5 servings of fruits/vegetables daily, consuming healthier snacks, moderation in carbohydrate intake and increasing intake of lean protein  Physical Exam  Vitals signs reviewed  Constitutional:       Appearance: She is normal weight  HENT:      Head: Normocephalic and atraumatic  Right Ear: External ear normal       Left Ear: External ear normal       Nose: Nose normal    Eyes:      General: No scleral icterus  Conjunctiva/sclera: Conjunctivae normal       Pupils: Pupils are equal, round, and reactive to light  Neck:      Musculoskeletal: Normal range of motion and neck supple  Cardiovascular:      Rate and Rhythm: Regular rhythm  Tachycardia present  Pulmonary:      Effort: Pulmonary effort is normal  No respiratory distress  Abdominal:      General: Abdomen is flat  There is no distension  Tenderness: There is no abdominal tenderness  Musculoskeletal:         General: No swelling or tenderness  Right lower leg: No edema  Left lower leg: No edema  Skin:     General: Skin is warm  Coloration: Skin is not jaundiced  Findings: No bruising, erythema or rash  Neurological:      General: No focal deficit present  Mental Status: She is alert and oriented to person, place, and time  Mental status is at baseline     Psychiatric:         Mood and Affect: Mood normal          Behavior: Behavior normal

## 2021-04-19 ENCOUNTER — TELEPHONE (OUTPATIENT)
Dept: PLASTIC SURGERY | Facility: CLINIC | Age: 41
End: 2021-04-19

## 2021-04-20 ENCOUNTER — LAB (OUTPATIENT)
Dept: LAB | Age: 41
End: 2021-04-20
Payer: COMMERCIAL

## 2021-04-20 DIAGNOSIS — Z30.2 ENCOUNTER FOR FEMALE STERILIZATION PROCEDURE: ICD-10-CM

## 2021-04-20 LAB
ALBUMIN SERPL BCP-MCNC: 3.8 G/DL (ref 3.5–5)
ALP SERPL-CCNC: 57 U/L (ref 46–116)
ALT SERPL W P-5'-P-CCNC: 19 U/L (ref 12–78)
ANION GAP SERPL CALCULATED.3IONS-SCNC: 3 MMOL/L (ref 4–13)
AST SERPL W P-5'-P-CCNC: 9 U/L (ref 5–45)
BASOPHILS # BLD AUTO: 0.08 THOUSANDS/ΜL (ref 0–0.1)
BASOPHILS NFR BLD AUTO: 1 % (ref 0–1)
BILIRUB SERPL-MCNC: 0.43 MG/DL (ref 0.2–1)
BUN SERPL-MCNC: 12 MG/DL (ref 5–25)
CALCIUM SERPL-MCNC: 9.2 MG/DL (ref 8.3–10.1)
CHLORIDE SERPL-SCNC: 107 MMOL/L (ref 100–108)
CO2 SERPL-SCNC: 28 MMOL/L (ref 21–32)
CREAT SERPL-MCNC: 0.7 MG/DL (ref 0.6–1.3)
EOSINOPHIL # BLD AUTO: 0.34 THOUSAND/ΜL (ref 0–0.61)
EOSINOPHIL NFR BLD AUTO: 4 % (ref 0–6)
ERYTHROCYTE [DISTWIDTH] IN BLOOD BY AUTOMATED COUNT: 13.2 % (ref 11.6–15.1)
GFR SERPL CREATININE-BSD FRML MDRD: 106 ML/MIN/1.73SQ M
GLUCOSE SERPL-MCNC: 84 MG/DL (ref 65–140)
HCT VFR BLD AUTO: 40.1 % (ref 34.8–46.1)
HGB BLD-MCNC: 13.2 G/DL (ref 11.5–15.4)
IMM GRANULOCYTES # BLD AUTO: 0.03 THOUSAND/UL (ref 0–0.2)
IMM GRANULOCYTES NFR BLD AUTO: 0 % (ref 0–2)
LYMPHOCYTES # BLD AUTO: 1.89 THOUSANDS/ΜL (ref 0.6–4.47)
LYMPHOCYTES NFR BLD AUTO: 23 % (ref 14–44)
MCH RBC QN AUTO: 30.1 PG (ref 26.8–34.3)
MCHC RBC AUTO-ENTMCNC: 32.9 G/DL (ref 31.4–37.4)
MCV RBC AUTO: 91 FL (ref 82–98)
MONOCYTES # BLD AUTO: 0.75 THOUSAND/ΜL (ref 0.17–1.22)
MONOCYTES NFR BLD AUTO: 9 % (ref 4–12)
NEUTROPHILS # BLD AUTO: 4.98 THOUSANDS/ΜL (ref 1.85–7.62)
NEUTS SEG NFR BLD AUTO: 63 % (ref 43–75)
NRBC BLD AUTO-RTO: 0 /100 WBCS
PLATELET # BLD AUTO: 386 THOUSANDS/UL (ref 149–390)
PMV BLD AUTO: 10 FL (ref 8.9–12.7)
POTASSIUM SERPL-SCNC: 4.1 MMOL/L (ref 3.5–5.3)
PROT SERPL-MCNC: 7.3 G/DL (ref 6.4–8.2)
RBC # BLD AUTO: 4.39 MILLION/UL (ref 3.81–5.12)
SODIUM SERPL-SCNC: 138 MMOL/L (ref 136–145)
T4 FREE SERPL-MCNC: 0.78 NG/DL (ref 0.76–1.46)
TSH SERPL DL<=0.05 MIU/L-ACNC: 0.52 UIU/ML (ref 0.36–3.74)
WBC # BLD AUTO: 8.07 THOUSAND/UL (ref 4.31–10.16)

## 2021-04-20 PROCEDURE — 84439 ASSAY OF FREE THYROXINE: CPT

## 2021-04-20 PROCEDURE — 36415 COLL VENOUS BLD VENIPUNCTURE: CPT

## 2021-04-20 PROCEDURE — 80053 COMPREHEN METABOLIC PANEL: CPT

## 2021-04-20 PROCEDURE — 84443 ASSAY THYROID STIM HORMONE: CPT

## 2021-04-20 PROCEDURE — 85025 COMPLETE CBC W/AUTO DIFF WBC: CPT

## 2021-04-22 ENCOUNTER — OFFICE VISIT (OUTPATIENT)
Dept: OBGYN CLINIC | Facility: CLINIC | Age: 41
End: 2021-04-22

## 2021-04-22 VITALS
BODY MASS INDEX: 26.36 KG/M2 | WEIGHT: 178 LBS | HEIGHT: 69 IN | DIASTOLIC BLOOD PRESSURE: 88 MMHG | SYSTOLIC BLOOD PRESSURE: 130 MMHG

## 2021-04-22 DIAGNOSIS — Z30.09 CONSULTATION FOR FEMALE STERILIZATION: Primary | ICD-10-CM

## 2021-04-22 PROCEDURE — PREOP: Performed by: PHYSICIAN ASSISTANT

## 2021-04-22 PROCEDURE — 3008F BODY MASS INDEX DOCD: CPT | Performed by: INTERNAL MEDICINE

## 2021-04-22 NOTE — H&P (VIEW-ONLY)
Assessment/Plan:    Consultation for female sterilization  Patient scheduled for bilateral salpingectomy on 2021  Aware nothing to eat or drink after midnight  Reviewed hibiclens  Problem List Items Addressed This Visit        Other    Consultation for female sterilization - Primary     Patient scheduled for bilateral salpingectomy on 2021  Aware nothing to eat or drink after midnight  Reviewed hibiclens  Subjective:      Patient ID: Mirtha Patel is a 35 y o  female  HPI  36 yo seen for preop evaluation for B/L salpingectomy  Patient has reviewed the surgery and complications in length with Dr Vince Boyd at previous appointment on 3/12/2021  The following portions of the patient's history were reviewed and updated as appropriate:   She  has a past medical history of Arthritis, Crohn disease ([address] ), Depression, Drug abuse ([address] ), Ganglion cyst of wrist, right, Hepatitis C, Hepatitis C virus infection, Hordeolum externum left eye, unspecified eyelid, Pyelonephritis, Shingles, Stargardt's disease, Stargardts Disease, Tear of right scapholunate ligament, Varicella, and Wears glasses    She   Patient Active Problem List    Diagnosis Date Noted    Encounter for monitoring Suboxone maintenance therapy 03/15/2021    Consultation for female sterilization 2021    Psychosocial stressors 10/19/2020    Dermatitis 2020    Neck pain 2019    Cigarette nicotine dependence without complication     History of hepatitis C 2019    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, intellectual disability, and dysmorphic features ([facility] ) 2019    Crohn's disease without complication ([facility] )     Renal cyst 2017    Depression 2015    History of heroin abuse ([facility] ) 2015    Hemorrhoid 2015     She  has a past surgical history that includes  section; pr esophagogastroduodenoscopy transoral diagnostic (N/A, 2016); Induced ; Liver biopsy (N/A, ); [place] tooth extraction; and pr revise median n/carpal tunnel surg (Left, 2021)  Her family history includes Bipolar disorder in her brother; Cancer in her mother; Diabetes in her brother; Heart attack in her mother; Heart defect in her daughter; Hypertension in her brother; Kidney disease in her brother; No Known Problems in her brother, daughter, and son; Seizures in her father; Stroke in her father  She  reports that she has been smoking cigarettes  She has been smoking about 1 00 pack per day  She has never used smokeless tobacco  She reports that she does not drink alcohol or use drugs  Current Outpatient Medications   Medication Sig Dispense Refill    buprenorphine-naloxone (Suboxone) 2-0 5 mg Place 3 Film (6 mg total) under the tongue daily 90 Film 1    ibuprofen (MOTRIN) 600 mg tablet Take 1 tablet (600 mg total) by mouth every 6 (six) hours as needed for moderate pain (cramping) 30 tablet 0    mesalamine (PENTASA) 500 mg CR capsule Take 1 capsule (500 mg total) by mouth 3 (three) times a day 270 capsule 3    naloxone (NARCAN) 4 mg/0 1 mL nasal spray Administer 1 spray into a nostril  If no response after 2-3 minutes, give another dose in the other nostril using a new spray  1 each 1    sertraline (ZOLOFT) 100 mg tablet Take 1 tablet daily with 50 mg tablet  (Patient taking differently: Take 100 mg by mouth daily Total of 125 mg daily) 90 tablet 1    sertraline (ZOLOFT) 25 mg tablet Take 1 tablet daily with 100 mg tablet  (Patient taking differently: Take 25 mg by mouth daily Total 125 mg daily) 90 tablet 1     No current facility-administered medications for this visit  She has No Known Allergies       Review of Systems   Constitutional: Negative for fatigue, fever and unexpected weight change  HENT: Negative for dental problem and sinus pressure  Eyes: Negative for visual disturbance     Respiratory: Negative for cough, shortness of breath and wheezing  Cardiovascular: Negative for chest pain  Gastrointestinal: Negative for abdominal pain, blood in stool, constipation, diarrhea, nausea and vomiting  Endocrine: Negative for polydipsia  Genitourinary: Negative for difficulty urinating, dyspareunia, dysuria, frequency, hematuria, pelvic pain and urgency  Musculoskeletal: Negative for arthralgias and back pain  Neurological: Negative for dizziness, seizures, light-headedness and headaches  Psychiatric/Behavioral: Negative for suicidal ideas  The patient is not nervous/anxious  Objective:      /88 (BP Location: Left arm, Patient Position: Sitting, Cuff Size: Standard)   Ht 5' 9" (1 753 m)   Wt 80 7 kg (178 lb)   LMP 03/26/2021   Breastfeeding Yes   BMI 26 29 kg/m²          Physical Exam  Constitutional:       Appearance: She is well-developed  Neck:      Thyroid: No thyromegaly  Cardiovascular:      Rate and Rhythm: Normal rate and regular rhythm  Heart sounds: Normal heart sounds  No murmur  No friction rub  No gallop  Pulmonary:      Effort: Pulmonary effort is normal  No respiratory distress  Breath sounds: Normal breath sounds  No wheezing or rales  Chest:      Chest wall: No tenderness  Skin:     General: Skin is warm and dry  Neurological:      Mental Status: She is alert and oriented to person, place, and time     Psychiatric:         Behavior: Behavior normal

## 2021-04-22 NOTE — PRE-PROCEDURE INSTRUCTIONS
Pre-Surgery Instructions:   Medication Instructions    buprenorphine-naloxone (Suboxone) 2-0 5 mg Instructed patient per Anesthesia Guidelines   ibuprofen (MOTRIN) 600 mg tablet Instructed patient per Anesthesia Guidelines   mesalamine (PENTASA) 500 mg CR capsule Instructed patient per Anesthesia Guidelines   naloxone (NARCAN) 4 mg/0 1 mL nasal spray Instructed patient per Anesthesia Guidelines   sertraline (ZOLOFT) 100 mg tablet Instructed patient per Anesthesia Guidelines   sertraline (ZOLOFT) 25 mg tablet Instructed patient per Anesthesia Guidelines  Pre Procedure instructions given and verbalized understanding  NPO after MN  Bathing reviewed  Morning meds with water  Awaiting anesthesia instructions from mesalamine and suboxone

## 2021-04-22 NOTE — PROGRESS NOTES
Assessment/Plan:    Consultation for female sterilization  Patient scheduled for bilateral salpingectomy on 2021  Aware nothing to eat or drink after midnight  Reviewed hibiclens  Problem List Items Addressed This Visit        Other    Consultation for female sterilization - Primary     Patient scheduled for bilateral salpingectomy on 2021  Aware nothing to eat or drink after midnight  Reviewed hibiclens  Subjective:      Patient ID: Mirtha Patel is a 35 y o  female  HPI  34 yo seen for preop evaluation for B/L salpingectomy  Patient has reviewed the surgery and complications in length with Dr Giana Doe at previous appointment on 3/12/2021  The following portions of the patient's history were reviewed and updated as appropriate:   She  has a past medical history of Arthritis, Crohn disease ([address] ), Depression, Drug abuse ([address] ), Ganglion cyst of wrist, right, Hepatitis C, Hepatitis C virus infection, Hordeolum externum left eye, unspecified eyelid, Pyelonephritis, Shingles, Stargardt's disease, Stargardts Disease, Tear of right scapholunate ligament, Varicella, and Wears glasses    She   Patient Active Problem List    Diagnosis Date Noted    Encounter for monitoring Suboxone maintenance therapy 03/15/2021    Consultation for female sterilization 2021    Psychosocial stressors 10/19/2020    Dermatitis 2020    Neck pain 2019    Cigarette nicotine dependence without complication     History of hepatitis C 2019    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, intellectual disability, and dysmorphic features ([facility] ) 2019    Crohn's disease without complication ([facility] )     Renal cyst 2017    Depression 2015    History of heroin abuse ([facility] ) 2015    Hemorrhoid 2015     She  has a past surgical history that includes  section; pr esophagogastroduodenoscopy transoral diagnostic (N/A, 2016); Induced ; Liver biopsy (N/A, ); [place] tooth extraction; and pr revise median n/carpal tunnel surg (Left, 2021)  Her family history includes Bipolar disorder in her brother; Cancer in her mother; Diabetes in her brother; Heart attack in her mother; Heart defect in her daughter; Hypertension in her brother; Kidney disease in her brother; No Known Problems in her brother, daughter, and son; Seizures in her father; Stroke in her father  She  reports that she has been smoking cigarettes  She has been smoking about 1 00 pack per day  She has never used smokeless tobacco  She reports that she does not drink alcohol or use drugs  Current Outpatient Medications   Medication Sig Dispense Refill    buprenorphine-naloxone (Suboxone) 2-0 5 mg Place 3 Film (6 mg total) under the tongue daily 90 Film 1    ibuprofen (MOTRIN) 600 mg tablet Take 1 tablet (600 mg total) by mouth every 6 (six) hours as needed for moderate pain (cramping) 30 tablet 0    mesalamine (PENTASA) 500 mg CR capsule Take 1 capsule (500 mg total) by mouth 3 (three) times a day 270 capsule 3    naloxone (NARCAN) 4 mg/0 1 mL nasal spray Administer 1 spray into a nostril  If no response after 2-3 minutes, give another dose in the other nostril using a new spray  1 each 1    sertraline (ZOLOFT) 100 mg tablet Take 1 tablet daily with 50 mg tablet  (Patient taking differently: Take 100 mg by mouth daily Total of 125 mg daily) 90 tablet 1    sertraline (ZOLOFT) 25 mg tablet Take 1 tablet daily with 100 mg tablet  (Patient taking differently: Take 25 mg by mouth daily Total 125 mg daily) 90 tablet 1     No current facility-administered medications for this visit  She has No Known Allergies       Review of Systems   Constitutional: Negative for fatigue, fever and unexpected weight change  HENT: Negative for dental problem and sinus pressure  Eyes: Negative for visual disturbance     Respiratory: Negative for cough, shortness of breath and wheezing  Cardiovascular: Negative for chest pain  Gastrointestinal: Negative for abdominal pain, blood in stool, constipation, diarrhea, nausea and vomiting  Endocrine: Negative for polydipsia  Genitourinary: Negative for difficulty urinating, dyspareunia, dysuria, frequency, hematuria, pelvic pain and urgency  Musculoskeletal: Negative for arthralgias and back pain  Neurological: Negative for dizziness, seizures, light-headedness and headaches  Psychiatric/Behavioral: Negative for suicidal ideas  The patient is not nervous/anxious  Objective:      /88 (BP Location: Left arm, Patient Position: Sitting, Cuff Size: Standard)   Ht 5' 9" (1 753 m)   Wt 80 7 kg (178 lb)   LMP 03/26/2021   Breastfeeding Yes   BMI 26 29 kg/m²          Physical Exam  Constitutional:       Appearance: She is well-developed  Neck:      Thyroid: No thyromegaly  Cardiovascular:      Rate and Rhythm: Normal rate and regular rhythm  Heart sounds: Normal heart sounds  No murmur  No friction rub  No gallop  Pulmonary:      Effort: Pulmonary effort is normal  No respiratory distress  Breath sounds: Normal breath sounds  No wheezing or rales  Chest:      Chest wall: No tenderness  Skin:     General: Skin is warm and dry  Neurological:      Mental Status: She is alert and oriented to person, place, and time     Psychiatric:         Behavior: Behavior normal

## 2021-04-22 NOTE — ASSESSMENT & PLAN NOTE
Patient scheduled for bilateral salpingectomy on 4/26/2021  Aware nothing to eat or drink after midnight  Reviewed hibiclens

## 2021-04-26 ENCOUNTER — ANESTHESIA (OUTPATIENT)
Dept: PERIOP | Facility: AMBULARY SURGERY CENTER | Age: 41
End: 2021-04-26
Payer: COMMERCIAL

## 2021-04-26 ENCOUNTER — HOSPITAL ENCOUNTER (OUTPATIENT)
Facility: AMBULARY SURGERY CENTER | Age: 41
Setting detail: OUTPATIENT SURGERY
Discharge: HOME | End: 2021-04-26
Attending: OBSTETRICS & GYNECOLOGY | Admitting: OBSTETRICS & GYNECOLOGY
Payer: COMMERCIAL

## 2021-04-26 VITALS
TEMPERATURE: 97.5 F | DIASTOLIC BLOOD PRESSURE: 66 MMHG | OXYGEN SATURATION: 95 % | SYSTOLIC BLOOD PRESSURE: 110 MMHG | HEART RATE: 75 BPM | RESPIRATION RATE: 16 BRPM

## 2021-04-26 DIAGNOSIS — Z98.51 STATUS POST TUBAL LIGATION: Primary | ICD-10-CM

## 2021-04-26 DIAGNOSIS — Z30.2 ENCOUNTER FOR FEMALE STERILIZATION PROCEDURE: ICD-10-CM

## 2021-04-26 DIAGNOSIS — T65.291A TOXIC EFFECT OF TOBACCO AND NICOTINE, ACCIDENTAL OR UNINTENTIONAL, INITIAL ENCOUNTER: ICD-10-CM

## 2021-04-26 LAB
EXT PREGNANCY TEST URINE: NEGATIVE
EXT. CONTROL: NORMAL

## 2021-04-26 PROCEDURE — 81025 URINE PREGNANCY TEST: CPT | Performed by: OBSTETRICS & GYNECOLOGY

## 2021-04-26 PROCEDURE — 88302 TISSUE EXAM BY PATHOLOGIST: CPT | Performed by: PATHOLOGY

## 2021-04-26 PROCEDURE — 58661 LAPAROSCOPY REMOVE ADNEXA: CPT | Performed by: OBSTETRICS & GYNECOLOGY

## 2021-04-26 RX ORDER — MIDAZOLAM HYDROCHLORIDE 2 MG/2ML
INJECTION, SOLUTION INTRAMUSCULAR; INTRAVENOUS AS NEEDED
Status: DISCONTINUED | OUTPATIENT
Start: 2021-04-26 | End: 2021-04-26

## 2021-04-26 RX ORDER — PROPOFOL 10 MG/ML
INJECTION, EMULSION INTRAVENOUS AS NEEDED
Status: DISCONTINUED | OUTPATIENT
Start: 2021-04-26 | End: 2021-04-26

## 2021-04-26 RX ORDER — ONDANSETRON 2 MG/ML
4 INJECTION INTRAMUSCULAR; INTRAVENOUS EVERY 6 HOURS PRN
Status: DISCONTINUED | OUTPATIENT
Start: 2021-04-26 | End: 2021-04-26 | Stop reason: HOSPADM

## 2021-04-26 RX ORDER — DEXAMETHASONE SODIUM PHOSPHATE 10 MG/ML
INJECTION, SOLUTION INTRAMUSCULAR; INTRAVENOUS AS NEEDED
Status: DISCONTINUED | OUTPATIENT
Start: 2021-04-26 | End: 2021-04-26

## 2021-04-26 RX ORDER — CEFAZOLIN SODIUM 2 G/50ML
2000 SOLUTION INTRAVENOUS ONCE
Status: COMPLETED | OUTPATIENT
Start: 2021-04-26 | End: 2021-04-26

## 2021-04-26 RX ORDER — HYDROMORPHONE HCL/PF 1 MG/ML
0.5 SYRINGE (ML) INJECTION
Status: DISCONTINUED | OUTPATIENT
Start: 2021-04-26 | End: 2021-04-26 | Stop reason: HOSPADM

## 2021-04-26 RX ORDER — FENTANYL CITRATE/PF 50 MCG/ML
25 SYRINGE (ML) INJECTION
Status: COMPLETED | OUTPATIENT
Start: 2021-04-26 | End: 2021-04-26

## 2021-04-26 RX ORDER — SODIUM CHLORIDE, SODIUM LACTATE, POTASSIUM CHLORIDE, CALCIUM CHLORIDE 600; 310; 30; 20 MG/100ML; MG/100ML; MG/100ML; MG/100ML
125 INJECTION, SOLUTION INTRAVENOUS CONTINUOUS
Status: DISCONTINUED | OUTPATIENT
Start: 2021-04-26 | End: 2021-04-26 | Stop reason: HOSPADM

## 2021-04-26 RX ORDER — KETAMINE HYDROCHLORIDE 50 MG/ML
INJECTION, SOLUTION, CONCENTRATE INTRAMUSCULAR; INTRAVENOUS AS NEEDED
Status: DISCONTINUED | OUTPATIENT
Start: 2021-04-26 | End: 2021-04-26

## 2021-04-26 RX ORDER — OXYCODONE HYDROCHLORIDE 5 MG/1
5 TABLET ORAL EVERY 6 HOURS PRN
Qty: 6 TABLET | Refills: 0 | Status: SHIPPED | OUTPATIENT
Start: 2021-04-26 | End: 2021-05-13

## 2021-04-26 RX ORDER — ROCURONIUM BROMIDE 10 MG/ML
INJECTION, SOLUTION INTRAVENOUS AS NEEDED
Status: DISCONTINUED | OUTPATIENT
Start: 2021-04-26 | End: 2021-04-26

## 2021-04-26 RX ORDER — OXYCODONE HYDROCHLORIDE 5 MG/1
5 TABLET ORAL EVERY 4 HOURS PRN
Status: DISCONTINUED | OUTPATIENT
Start: 2021-04-26 | End: 2021-04-26 | Stop reason: HOSPADM

## 2021-04-26 RX ORDER — NEOSTIGMINE METHYLSULFATE 1 MG/ML
INJECTION INTRAVENOUS AS NEEDED
Status: DISCONTINUED | OUTPATIENT
Start: 2021-04-26 | End: 2021-04-26

## 2021-04-26 RX ORDER — ACETAMINOPHEN 325 MG/1
975 TABLET ORAL EVERY 8 HOURS SCHEDULED
Status: DISCONTINUED | OUTPATIENT
Start: 2021-04-26 | End: 2021-04-26 | Stop reason: HOSPADM

## 2021-04-26 RX ORDER — EPHEDRINE SULFATE 50 MG/ML
INJECTION INTRAVENOUS AS NEEDED
Status: DISCONTINUED | OUTPATIENT
Start: 2021-04-26 | End: 2021-04-26

## 2021-04-26 RX ORDER — OXYCODONE HYDROCHLORIDE 5 MG/1
10 TABLET ORAL EVERY 4 HOURS PRN
Status: DISCONTINUED | OUTPATIENT
Start: 2021-04-26 | End: 2021-04-26 | Stop reason: HOSPADM

## 2021-04-26 RX ORDER — ACETAMINOPHEN 325 MG/1
975 TABLET ORAL ONCE
Status: COMPLETED | OUTPATIENT
Start: 2021-04-26 | End: 2021-04-26

## 2021-04-26 RX ORDER — GLYCOPYRROLATE 0.2 MG/ML
INJECTION INTRAMUSCULAR; INTRAVENOUS AS NEEDED
Status: DISCONTINUED | OUTPATIENT
Start: 2021-04-26 | End: 2021-04-26

## 2021-04-26 RX ORDER — LIDOCAINE HYDROCHLORIDE 10 MG/ML
INJECTION, SOLUTION EPIDURAL; INFILTRATION; INTRACAUDAL; PERINEURAL AS NEEDED
Status: DISCONTINUED | OUTPATIENT
Start: 2021-04-26 | End: 2021-04-26

## 2021-04-26 RX ORDER — ONDANSETRON 2 MG/ML
INJECTION INTRAMUSCULAR; INTRAVENOUS AS NEEDED
Status: DISCONTINUED | OUTPATIENT
Start: 2021-04-26 | End: 2021-04-26

## 2021-04-26 RX ORDER — ONDANSETRON 2 MG/ML
4 INJECTION INTRAMUSCULAR; INTRAVENOUS ONCE AS NEEDED
Status: DISCONTINUED | OUTPATIENT
Start: 2021-04-26 | End: 2021-04-26 | Stop reason: HOSPADM

## 2021-04-26 RX ORDER — BUPIVACAINE HYDROCHLORIDE 2.5 MG/ML
INJECTION, SOLUTION EPIDURAL; INFILTRATION; INTRACAUDAL AS NEEDED
Status: DISCONTINUED | OUTPATIENT
Start: 2021-04-26 | End: 2021-04-26 | Stop reason: HOSPADM

## 2021-04-26 RX ORDER — FENTANYL CITRATE 50 UG/ML
INJECTION, SOLUTION INTRAMUSCULAR; INTRAVENOUS AS NEEDED
Status: DISCONTINUED | OUTPATIENT
Start: 2021-04-26 | End: 2021-04-26

## 2021-04-26 RX ORDER — IBUPROFEN 600 MG/1
600 TABLET ORAL EVERY 6 HOURS SCHEDULED
Status: DISCONTINUED | OUTPATIENT
Start: 2021-04-26 | End: 2021-04-26 | Stop reason: HOSPADM

## 2021-04-26 RX ORDER — KETOROLAC TROMETHAMINE 30 MG/ML
INJECTION, SOLUTION INTRAMUSCULAR; INTRAVENOUS AS NEEDED
Status: DISCONTINUED | OUTPATIENT
Start: 2021-04-26 | End: 2021-04-26

## 2021-04-26 RX ADMIN — ACETAMINOPHEN 975 MG: 325 TABLET, FILM COATED ORAL at 09:32

## 2021-04-26 RX ADMIN — SODIUM CHLORIDE, SODIUM LACTATE, POTASSIUM CHLORIDE, AND CALCIUM CHLORIDE: .6; .31; .03; .02 INJECTION, SOLUTION INTRAVENOUS at 10:06

## 2021-04-26 RX ADMIN — ROCURONIUM BROMIDE 40 MG: 10 INJECTION, SOLUTION INTRAVENOUS at 10:11

## 2021-04-26 RX ADMIN — KETAMINE HYDROCHLORIDE 30 MG: 50 INJECTION INTRAMUSCULAR; INTRAVENOUS at 10:10

## 2021-04-26 RX ADMIN — FENTANYL CITRATE 25 MCG: 50 INJECTION INTRAMUSCULAR; INTRAVENOUS at 11:52

## 2021-04-26 RX ADMIN — EPHEDRINE SULFATE 10 MG: 50 INJECTION, SOLUTION INTRAVENOUS at 10:33

## 2021-04-26 RX ADMIN — CEFAZOLIN SODIUM 2000 MG: 2 SOLUTION INTRAVENOUS at 10:00

## 2021-04-26 RX ADMIN — PROPOFOL 200 MG: 10 INJECTION, EMULSION INTRAVENOUS at 10:10

## 2021-04-26 RX ADMIN — FENTANYL CITRATE 25 MCG: 50 INJECTION INTRAMUSCULAR; INTRAVENOUS at 11:49

## 2021-04-26 RX ADMIN — ROCURONIUM BROMIDE 10 MG: 10 INJECTION, SOLUTION INTRAVENOUS at 10:38

## 2021-04-26 RX ADMIN — KETAMINE HYDROCHLORIDE 10 MG: 50 INJECTION INTRAMUSCULAR; INTRAVENOUS at 11:10

## 2021-04-26 RX ADMIN — ONDANSETRON 4 MG: 2 INJECTION INTRAMUSCULAR; INTRAVENOUS at 10:50

## 2021-04-26 RX ADMIN — KETAMINE HYDROCHLORIDE 10 MG: 50 INJECTION INTRAMUSCULAR; INTRAVENOUS at 10:43

## 2021-04-26 RX ADMIN — GLYCOPYRROLATE 0.2 MG: 0.2 INJECTION, SOLUTION INTRAMUSCULAR; INTRAVENOUS at 10:33

## 2021-04-26 RX ADMIN — ROCURONIUM BROMIDE 10 MG: 10 INJECTION, SOLUTION INTRAVENOUS at 10:24

## 2021-04-26 RX ADMIN — GLYCOPYRROLATE 0.4 MG: 0.2 INJECTION, SOLUTION INTRAMUSCULAR; INTRAVENOUS at 10:58

## 2021-04-26 RX ADMIN — LIDOCAINE HYDROCHLORIDE 50 MG: 10 INJECTION, SOLUTION EPIDURAL; INFILTRATION; INTRACAUDAL at 10:10

## 2021-04-26 RX ADMIN — NEOSTIGMINE METHYLSULFATE 3 MG: 1 INJECTION, SOLUTION INTRAMUSCULAR; INTRAVENOUS; SUBCUTANEOUS at 10:58

## 2021-04-26 RX ADMIN — FENTANYL CITRATE 25 MCG: 50 INJECTION INTRAMUSCULAR; INTRAVENOUS at 11:46

## 2021-04-26 RX ADMIN — FENTANYL CITRATE 25 MCG: 50 INJECTION INTRAMUSCULAR; INTRAVENOUS at 11:56

## 2021-04-26 RX ADMIN — FENTANYL CITRATE 100 MCG: 50 INJECTION, SOLUTION INTRAMUSCULAR; INTRAVENOUS at 10:10

## 2021-04-26 RX ADMIN — MIDAZOLAM HYDROCHLORIDE 2 MG: 1 INJECTION, SOLUTION INTRAMUSCULAR; INTRAVENOUS at 10:06

## 2021-04-26 RX ADMIN — DEXAMETHASONE SODIUM PHOSPHATE 4 MG: 10 INJECTION, SOLUTION INTRAMUSCULAR; INTRAVENOUS at 10:17

## 2021-04-26 RX ADMIN — KETOROLAC TROMETHAMINE 30 MG: 30 INJECTION, SOLUTION INTRAMUSCULAR at 10:51

## 2021-04-26 NOTE — OP NOTE
OPERATIVE REPORT  PATIENT NAME: Mirtha Patel    :  1980  MRN: [id number]  Pt Location: [facility] OR [facility] 05    SURGERY DATE: 2021    Surgeon(s) and Role:     * Leila Dickey MD - Primary     * Dejuan Gracia MD - Assisting    Preop Diagnosis:  Encounter for female sterilization procedure [Z30 2]    Post-Op Diagnosis Codes:     * Encounter for female sterilization procedure [Z30 2]    Procedure(s) (LRB):  SALPINGECTOMY, LAPAROSCOPIC (Bilateral)    Specimen(s):  ID Type Source Tests Collected by Time Destination   1 : Left Fallopian Tube  Tissue Fallopian Tube, Left TISSUE EXAM Leila Dickey MD 2021 1047    2 : Right Fallopian Tube  Tissue Fallopian Tube, Right TISSUE EXAM Leila Dickey MD 2021 1047        Estimated Blood Loss:   20cc    Drains:  none    Anesthesia Type:   General    Operative Indications:  Encounter for female sterilization procedure [Z30 2]    Operative Findings:  Normal appearing external genitalia, vaginal mucosa, and cervix  On laparoscopy, evidence of normal appearing uterus, bilateral tubes, and ovaries  Complications:   None    Procedure and Technique:  Patient was brought to operating room  Correct patient and procedure were confirmed  General endotracheal anesthesia was adequately established  She was positioned in dorsal lithotomy with yellowfin stirrups  Haley hugger was placed to maintain core body temperature  Abdomen and vagina were prepped with chlorhexidine  Time out was performed  Núñez catheter was placed in bladder and secured  Speculum was used to visualize cervix  Allis clamp and uterine sound were placed and secured to each other for uterine manipulation  Attention was turned to abdomen  5mm incison was made at inferior umbilicus  5mm trocar was placed under direct visualization  After confirming successful entry into the abdomen, pneumoperitoneum was established to 15mmHg   Two additional 5mm trocars were placed in the RLQ and LLQ under direct visualization  Local anesthesia was injected at all trocar sites at the skin  Abdomen was inspected  Uterus, bilateral tubes, and ovaries were all normal in appearance  There was no evidence of injury to other organs  Enseal device was introduced and used to ligate along the left mesosalpinx in order to free the the left tube  Tube was transected using Enseal about 2 cm distal to the cornua  The right tube was removed in a similar manner  Both tubes were removed from the abdomen and sent for routine pathology  Hemostasis was confirmed at salpingectomy sites  Pneumoperitoneum was allowed to escape  Laparoscopic instruments and all three trocars were removed from the abdomen  Skin at trocar sites was closed with 4-0 monocryl and covered in surgical glue  Núñez catheter was removed from bladder and Allis clamp and uterine sound were removed from the vagina  Instrument counts were correct x 2 by end of procedure  Patient tolerated the procedure well  She was successfully woken up from anesthesia and transferred to PACU       Patient Disposition:  PACU     SIGNATURE: Ivania Huitron MD  DATE: April 26, 2021  TIME: 11:17 AM

## 2021-04-26 NOTE — ANESTHESIA POSTPROCEDURE EVALUATION
Post-Op Assessment Note    CV Status:  Stable  Pain Score: 0    Pain management: adequate     Mental Status:  Alert and awake   Hydration Status:  Euvolemic   PONV Controlled:  Controlled   Airway Patency:  Patent      Post Op Vitals Reviewed: Yes      Staff: Anesthesiologist, CRNA         No complications documented      BP   120/59   Temp  97   Pulse  77   Resp   14   SpO2   100

## 2021-04-26 NOTE — INTERVAL H&P NOTE
H&P reviewed  After examining the patient I find no changes in the patients condition since the H&P had been written    Reviewed sterilization and procedure and anticipated recovery -     Vitals:    04/26/21 0926   BP: 126/73   Pulse: 74   Resp: 18   Temp: (!) 97 3 °F (36 3 °C)   SpO2: 97%

## 2021-04-26 NOTE — DISCHARGE INSTRUCTIONS
Salpingectomy   WHAT YOU NEED TO KNOW:   A salpingectomy is surgery to remove one or both of your fallopian tubes  The fallopian tubes carry eggs from the ovaries to the uterus  They are part of a woman's reproductive system  A salpingectomy may be done to treat an ectopic pregnancy, cancer, endometriosis, or an infection  It may also be done to prevent pregnancy or some types of cancer  DISCHARGE INSTRUCTIONS:   Call 911 for any of the following:   · You feel lightheaded, short of breath, and have chest pain  · You cough up blood  · You have trouble breathing  Seek care immediately if:   · Your arm or leg feels warm, tender, and painful  It may look swollen and red  · Blood soaks through your bandage  · Your stitches come apart  · You soak through 1 sanitary pad in 1 hour  · You have trouble urinating or cannot urinate at all  Contact your healthcare provider if:   · You have a fever or chills  · Your wound is red, swollen, or draining pus  · You have pus or a foul-smelling odor coming from your vagina  · Your pain does not get better after you take your medicine  · You have nausea or are vomiting  · Your skin is itchy, swollen, or you have a rash  · You have questions or concerns about your condition or care  Medicines: You may need any of the following:  · Prescription pain medicine  may be given  Ask your healthcare provider how to take this medicine safely  · NSAIDs , such as ibuprofen, help decrease swelling, pain, and fever  NSAIDs can cause stomach bleeding or kidney problems in certain people  If you take blood thinner medicine, always ask your healthcare provider if NSAIDs are safe for you  Always read the medicine label and follow directions  · Take your medicine as directed  Contact your healthcare provider if you think your medicine is not helping or if you have side effects  Tell him or her if you are allergic to any medicine   Keep a list of the medicines, vitamins, and herbs you take  Include the amounts, and when and why you take them  Bring the list or the pill bottles to follow-up visits  Carry your medicine list with you in case of an emergency  Care for your wound as directed:  Ask your healthcare provider when your wound can get wet  Do not take a bath until your healthcare provider says it is okay  Take a shower only  Carefully wash around the wound with soap and water  Let the soap and water gently run over your incision  Do not  scrub your incision  Dry the area and put on new, clean bandages as directed  Change your bandages when they get wet or dirty  If you have strips of medical tape, let them fall off on their own  Activity:  Ask your healthcare provider when you can return to your normal activities  Do not douche, use tampons, or have sex until your healthcare provider says it is okay  These activities may cause infection  Do not exercise or lift anything heavy until your healthcare provider says it is okay  This may put too much stress on your incision  Follow up with your healthcare provider as directed:  Write down your questions so you remember to ask them during your visits  © Copyright [address] [place] [address] Drive Information is for End User's use only and may not be sold, redistributed or otherwise used for commercial purposes  All illustrations and images included in CareNotes® are the copyrighted property of A D A M , Inc  or Isabela Carpenter  The above information is an  only  It is not intended as medical advice for individual conditions or treatments  Talk to your doctor, nurse or pharmacist before following any medical regimen to see if it is safe and effective for you

## 2021-04-26 NOTE — ANESTHESIA PREPROCEDURE EVALUATION
Procedure:  SALPINGECTOMY, LAPAROSCOPIC (Bilateral Abdomen)    Relevant Problems   /RENAL   (+) Renal cyst      NEURO/PSYCH   (+) Depression   (+) History of hepatitis C   (+) History of heroin abuse ([facility] )      Hepatitis C dx 15 yrs ago-2015 retested told undetectable   Arthritis shoulders   Wears glasses reading   Pyelonephritis pyelonephritis   Ganglion cyst of wrist, right LAST ASSESSED: [id number]   Drug abuse ([facility] )    Hepatitis C virus infection LAST ASSESSED: [id number]   Hordeolum externum left eye, unspecified eyelid LAST ASSESSED: [id number]   Stargardt's disease    Tear of right scapholunate ligament LAST ASSESSED: [id number]   Crohn disease ([facility] )    Varicella as child   Shingles    Depression    Stargardts Disease dx when 23years old   Infectious viral hepatitis hep c treated     On suboxone   Physical Exam    Airway    Mallampati score: I  TM Distance: >3 FB  Neck ROM: full     Dental       Cardiovascular  Cardiovascular exam normal    Pulmonary  Pulmonary exam normal     Other Findings        Anesthesia Plan  ASA Score- 2     Anesthesia Type- general with ASA Monitors  Additional Monitors:   Airway Plan: ETT  Comment: D/w patient we will give her opioids intra-op but may have limited effects due to current suboxone use  Plan Factors-Exercise tolerance (METS): >4 METS  Chart reviewed  EKG reviewed  Imaging results reviewed  Existing labs reviewed  Patient summary reviewed  Induction- intravenous  Postoperative Plan- Plan for postoperative opioid use  Planned trial extubation    Informed Consent- Anesthetic plan and risks discussed with patient  I personally reviewed this patient with the CRNA  Discussed and agreed on the Anesthesia Plan with the CRNA  James Jim

## 2021-04-27 ENCOUNTER — TELEPHONE (OUTPATIENT)
Dept: OBGYN CLINIC | Facility: OTHER | Age: 41
End: 2021-04-27

## 2021-04-27 NOTE — TELEPHONE ENCOUNTER
Patient called and left message with [facility] to cancel her post op appointment with Dr Maci Best because her sx was cancelled  Cancelled appointment and left message with patient to make her aware

## 2021-05-01 ENCOUNTER — NURSE TRIAGE (OUTPATIENT)
Dept: OTHER | Facility: OTHER | Age: 41
End: 2021-05-01

## 2021-05-01 NOTE — TELEPHONE ENCOUNTER
Regarding: after procedure questions   ----- Message from Hoang Deashilpa sent at 5/1/2021 11:32 AM EDT -----  " I had a procedure on 04/26 and I am wondering when I am able to use tampoons again, have sex,  things like that "

## 2021-05-01 NOTE — TELEPHONE ENCOUNTER
Reached out to on call Dr Usha Walker via [id number]  Confirmed patient may use tampons and have sex  Reason for Disposition   Resuming sexual relations, questions about    Answer Assessment - Initial Assessment Questions  1  SYMPTOM: "What's the main symptom you're concerned about?" (e g , pain, fever, vomiting)      When can I begin to use tampons and have sex again? I lost my discharge papers  2  ONSET: "When did *No Answer*  start?"      N/A    3  SURGERY: "What surgery was performed?"      Salpingectomy     4  DATE of SURGERY: "When was surgery performed?"       4/26    5  ANESTHESIA: " What type of anesthesia did you have?" (e g , general, spinal, epidural, local)      General    6  PAIN: "Is there any pain?" If so, ask: "How bad is it?"  (Scale 1-10; or mild, moderate, severe)      No     7  FEVER: "Do you have a fever?" If so, ask: "What is your temperature, how was it measured, and when did it start?"      No     8  VOMITING: "Is there any vomiting?" If yes, ask: "How many times?"      No     9  BLEEDING: "Is there any bleeding?" If so, ask: "How much?" and "Where?"      No     10   OTHER SYMPTOMS: "Do you have any other symptoms?" (e g , drainage from wound, painful urination, constipation)        No    Protocols used: POST-OP SYMPTOMS AND QUESTIONS-[facility]

## 2021-05-03 NOTE — TELEPHONE ENCOUNTER
Patient scheduled for office on 05/06/2021 with Dr Jeffry Maravilla can discuss further than    MM CMA

## 2021-05-13 ENCOUNTER — OFFICE VISIT (OUTPATIENT)
Dept: INTERNAL MEDICINE CLINIC | Facility: CLINIC | Age: 41
End: 2021-05-13
Payer: COMMERCIAL

## 2021-05-13 VITALS
OXYGEN SATURATION: 98 % | DIASTOLIC BLOOD PRESSURE: 72 MMHG | TEMPERATURE: 98.2 F | BODY MASS INDEX: 25.95 KG/M2 | HEART RATE: 84 BPM | HEIGHT: 69 IN | SYSTOLIC BLOOD PRESSURE: 120 MMHG | WEIGHT: 175.2 LBS

## 2021-05-13 DIAGNOSIS — F11.20 SUBOXONE MAINTENANCE TREATMENT COMPLICATING PREGNANCY, ANTEPARTUM, THIRD TRIMESTER (HCC): ICD-10-CM

## 2021-05-13 DIAGNOSIS — Z79.899 ENCOUNTER FOR MONITORING SUBOXONE MAINTENANCE THERAPY: ICD-10-CM

## 2021-05-13 DIAGNOSIS — Z51.81 ENCOUNTER FOR MONITORING SUBOXONE MAINTENANCE THERAPY: ICD-10-CM

## 2021-05-13 DIAGNOSIS — O99.323 SUBOXONE MAINTENANCE TREATMENT COMPLICATING PREGNANCY, ANTEPARTUM, THIRD TRIMESTER (HCC): ICD-10-CM

## 2021-05-13 DIAGNOSIS — G43.109 MIGRAINE WITH AURA AND WITHOUT STATUS MIGRAINOSUS, NOT INTRACTABLE: Primary | ICD-10-CM

## 2021-05-13 DIAGNOSIS — Z00.00 MEDICARE ANNUAL WELLNESS VISIT, SUBSEQUENT: ICD-10-CM

## 2021-05-13 PROCEDURE — 3725F SCREEN DEPRESSION PERFORMED: CPT | Performed by: INTERNAL MEDICINE

## 2021-05-13 PROCEDURE — 99213 OFFICE O/P EST LOW 20 MIN: CPT | Performed by: INTERNAL MEDICINE

## 2021-05-13 PROCEDURE — G0439 PPPS, SUBSEQ VISIT: HCPCS | Performed by: INTERNAL MEDICINE

## 2021-05-13 PROCEDURE — 3008F BODY MASS INDEX DOCD: CPT | Performed by: INTERNAL MEDICINE

## 2021-05-13 RX ORDER — BUPRENORPHINE AND NALOXONE 2; .5 MG/1; MG/1
6 FILM, SOLUBLE BUCCAL; SUBLINGUAL DAILY
Qty: 90 FILM | Refills: 1 | Status: SHIPPED | OUTPATIENT
Start: 2021-05-13 | End: 2021-06-21 | Stop reason: SDUPTHER

## 2021-05-13 RX ORDER — RIZATRIPTAN BENZOATE 10 MG/1
10 TABLET ORAL ONCE AS NEEDED
Qty: 9 TABLET | Refills: 0 | Status: SHIPPED | OUTPATIENT
Start: 2021-05-13 | End: 2021-06-11

## 2021-05-13 NOTE — ASSESSMENT & PLAN NOTE
Will provide the patient with a prescription for Maxalt 10 mg daily to take 1/2 a tablet at the onset of migraine and 2nd dose within 30 minutes if no effect

## 2021-05-13 NOTE — PROGRESS NOTES
Assessment/Plan:    Encounter for monitoring Suboxone maintenance therapy  Maintain current dosing of 6 mg daily    Migraine with aura and without status migrainosus, not intractable  Will provide the patient with a prescription for Maxalt 10 mg daily to take 1/2 a tablet at the onset of migraine and 2nd dose within 30 minutes if no effect    Medicare annual wellness visit, subsequent  Patient is up-to-date with age-appropriate screenings and immunizations  Diagnoses and all orders for this visit:    Migraine with aura and without status migrainosus, not intractable  -     rizatriptan (MAXALT) 10 MG tablet; Take 1 tablet (10 mg total) by mouth once as needed for migraine for up to 1 dose May repeat in 2 hours if needed    Suboxone maintenance treatment complicating pregnancy, antepartum, third trimester ([facility]  -     buprenorphine-naloxone (Suboxone) 2-0 5 mg; Place 3 Film (6 mg total) under the tongue daily    Encounter for monitoring Suboxone maintenance therapy    Medicare annual wellness visit, subsequent          Subjective:      Patient ID: Mirtha Patel is a 35 y o  female  Patient is seen for a follow-up visit for Suboxone maintenance and monitoring  In general she is doing well  She had some recent surgery for tubal ligation and for reasons that escape me, the patient, who is a recovering heroin addict, was given a prescription for oxycodone! Fortunately, she had a good sense to only take 1 dose of the medication before resuming her Suboxone  Lately she has been experiencing migraine headaches  They happen infrequently but I usually associated with some light sensitivity, and some nausea but she did not have any medication to take for her headache  In the past she has taken Excedrin migraine which has helped but on this particular occasion it did not  Is wondering if there is any specific medication she can take for her migraine    She has no history of coronary artery disease or high blood pressure but she is a current smoker  Family History   Problem Relation Age of Onset    Heart attack Mother     Cancer Mother         LEIOMYOSARCOMA    Seizures Father         EPILEPSY    Stroke Father     Diabetes Brother     No Known Problems Daughter     No Known Problems Son     Hypertension Brother     No Known Problems Brother     Kidney disease Brother     Bipolar disorder Brother     Heart defect Daughter         ASD?      Social History     Socioeconomic History    Marital status:      Spouse name: Not on file    Number of children: Not on file    Years of education: Not on file    Highest education level: Not on file   Occupational History    Not on file   Social Needs    Financial resource strain: Not on file    Food insecurity     Worry: Not on file     Inability: Not on file    Transportation needs     Medical: Not on file     Non-medical: Not on file   Tobacco Use    Smoking status: Current Every Day Smoker     Packs/day: 1 00     Types: Cigarettes    Smokeless tobacco: Never Used    Tobacco comment: encouraged smoking cessation   Substance and Sexual Activity    Alcohol use: No     Frequency: Never     Comment: former ETOH abuse-per pt    Drug use: No     Comment: former addict-per doctor PMH, H/O INTRAVENOUS DRUG USE IN REMISSION    Sexual activity: Yes     Partners: Male   Lifestyle    Physical activity     Days per week: Not on file     Minutes per session: Not on file    Stress: Not on file   Relationships    Social connections     Talks on phone: Not on file     Gets together: Not on file     Attends [employer] service: Not on file     Active member of club or organization: Not on file     Attends meetings of clubs or organizations: Not on file     Relationship status: Not on file    Intimate partner violence     Fear of current or ex partner: Not on file     Emotionally abused: Not on file     Physically abused: Not on file     Forced sexual activity: Not on file   Other Topics Concern    Not on file   Social History Narrative    DAILY [employer], 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     Past Medical History:   Diagnosis Date    Arthritis     shoulders    [facility]     Depression     Drug abuse ([facility] )     Ganglion cyst of wrist, right     LAST ASSESSED: [id number]    Hepatitis C     dx 15 yrs ago-2015 retested told undetectable    Hepatitis C virus infection     LAST ASSESSED: [id number]    Hordeolum externum left eye, unspecified eyelid     LAST ASSESSED: [id number]    Infectious viral hepatitis     hep c treated    Pyelonephritis     pyelonephritis    Shingles     Stargardt's disease     Stargardts Disease     dx when 23years old    Tear of right scapholunate ligament     LAST ASSESSED: [id number]    Varicella     as child    Wears glasses     reading       Current Outpatient Medications:     buprenorphine-naloxone (Suboxone) 2-0 5 mg, Place 3 Film (6 mg total) under the tongue daily, Disp: 90 Film, Rfl: 1    mesalamine (PENTASA) 500 mg CR capsule, Take 1 capsule (500 mg total) by mouth 3 (three) times a day, Disp: 270 capsule, Rfl: 3    sertraline (ZOLOFT) 100 mg tablet, Take 1 tablet daily with 50 mg tablet  (Patient taking differently: Take 100 mg by mouth daily Total of 125 mg daily), Disp: 90 tablet, Rfl: 1    sertraline (ZOLOFT) 25 mg tablet, Take 1 tablet daily with 100 mg tablet   (Patient taking differently: Take 25 mg by mouth daily Total 125 mg daily), Disp: 90 tablet, Rfl: 1    ibuprofen (MOTRIN) 600 mg tablet, Take 1 tablet (600 mg total) by mouth every 6 (six) hours as needed for moderate pain (cramping), Disp: 30 tablet, Rfl: 0    rizatriptan (MAXALT) 10 MG tablet, Take 1 tablet (10 mg total) by mouth once as needed for migraine for up to 1 dose May repeat in 2 hours if needed, Disp: 9 tablet, Rfl: 0  No Known Allergies  Past Surgical History:   Procedure Laterality Date     SECTION      LAST ASSESSED: [id number]    INDUCED       SURIGCAL TREATMENT FOR     LIVER BIOPSY N/A     AK ESOPHAGOGASTRODUODENOSCOPY TRANSORAL DIAGNOSTIC N/A 2016    Procedure: ESOPHAGOGASTRODUODENOSCOPY (EGD); Surgeon: Precious Palumbo MD;  Location: [facility]; Service: Gastroenterology    AK LAP,RMV  ADNEXAL STRUCTURE Bilateral 2021    Procedure: SALPINGECTOMY, LAPAROSCOPIC;  Surgeon: Mike Fuentes MD;  [facility];  Service: Gynecology    AK REVISE MEDIAN N/CARPAL TUNNEL SURG Left 2021    Procedure: CARPAL TUNNEL RELEASE;  Surgeon: Mayank Steiner MD;  [facility];  Service: Orthopedics    WISDOM TOOTH EXTRACTION           Review of Systems   Constitutional: Negative  HENT: Negative  Eyes: Negative  Respiratory: Negative  Cardiovascular: Negative  Gastrointestinal: Negative  Endocrine: Negative  Genitourinary: Negative  Musculoskeletal: Negative  Allergic/Immunologic: Negative  Neurological: Positive for headaches  Hematological: Negative  Psychiatric/Behavioral: Negative  Objective:      /72 (BP Location: Right arm, Patient Position: Sitting, Cuff Size: Adult)   Pulse 84   Temp 98 2 °F (36 8 °C) (Tympanic)   Ht 5' 9" (1 753 m)   Wt 79 5 kg (175 lb 3 2 oz)   SpO2 98% Comment: Room Air  BMI 25 87 kg/m²          Physical Exam  Vitals signs reviewed  Constitutional:       General: She is not in acute distress  Appearance: Normal appearance  She is normal weight  She is not ill-appearing, toxic-appearing or diaphoretic  HENT:      Head: Normocephalic and atraumatic  Eyes:      General: No scleral icterus  Conjunctiva/sclera: Conjunctivae normal       Pupils: Pupils are equal, round, and reactive to light  Neck:      Musculoskeletal: Neck supple  Cardiovascular:      Rate and Rhythm: Normal rate and regular rhythm  Pulmonary:      Effort: Pulmonary effort is normal  No respiratory distress     Abdominal:      General: Abdomen is flat  There is no distension  Musculoskeletal:      Right lower leg: No edema  Left lower leg: No edema  Skin:     Coloration: Skin is not jaundiced  Findings: No bruising, erythema or rash  Neurological:      General: No focal deficit present  Mental Status: She is alert and oriented to person, place, and time  Mental status is at baseline  Cranial Nerves: No cranial nerve deficit  Coordination: Coordination normal    Psychiatric:         Mood and Affect: Mood normal          Behavior: Behavior normal          Thought Content:  Thought content normal          Judgment: Judgment normal

## 2021-05-13 NOTE — PATIENT INSTRUCTIONS
Medicare Preventive Visit Patient Instructions  Thank you for completing your Welcome to Medicare Visit or Medicare Annual Wellness Visit today  Your next wellness visit will be due in one year (5/14/2022)  The screening/preventive services that you may require over the next 5-10 years are detailed below  Some tests may not apply to you based off risk factors and/or age  Screening tests ordered at today's visit but not completed yet may show as past due  Also, please note that scanned in results may not display below  Preventive Screenings:  Service Recommendations Previous Testing/Comments   Colorectal Cancer Screening  * Colonoscopy    * Fecal Occult Blood Test (FOBT)/Fecal Immunochemical Test (FIT)  * Fecal DNA/Cologuard Test  * Flexible Sigmoidoscopy Age: 54-65 years old   Colonoscopy: every 10 years (may be performed more frequently if at higher risk)  OR  FOBT/FIT: every 1 year  OR  Cologuard: every 3 years  OR  Sigmoidoscopy: every 5 years  Screening may be recommended earlier than age 48 if at higher risk for colorectal cancer  Also, an individualized decision between you and your healthcare provider will decide whether screening between the ages of 74-80 would be appropriate  Colonoscopy: Not on file  FOBT/FIT: Not on file  Cologuard: Not on file  Sigmoidoscopy: Not on file          Breast Cancer Screening Age: 36 years old  Frequency: every 1-2 years  Not required if history of left and right mastectomy Mammogram: 10/04/2018        Cervical Cancer Screening Between the ages of 21-29, pap smear recommended once every 3 years  Between the ages of 33-67, can perform pap smear with HPV co-testing every 5 years     Recommendations may differ for women with a history of total hysterectomy, cervical cancer, or abnormal pap smears in past  Pap Smear: 06/12/2020    Screening Current   Hepatitis C Screening Once for adults born between 1945 and 1965  More frequently in patients at high risk for Hepatitis C Hep C Antibody: 06/30/2020    Screening Not Indicated  History Hepatitis C   Diabetes Screening 1-2 times per year if you're at risk for diabetes or have pre-diabetes Fasting glucose: 82 mg/dL   A1C: 5 1 %    Screening Current   Cholesterol Screening Once every 5 years if you don't have a lipid disorder  May order more often based on risk factors  Lipid panel: 06/30/2020    Screening Current     Other Preventive Screenings Covered by Medicare:  1  Abdominal Aortic Aneurysm (AAA) Screening: covered once if your at risk  You're considered to be at risk if you have a family history of AAA  2  Lung Cancer Screening: covers low dose CT scan once per year if you meet all of the following conditions: (1) Age 50-69; (2) No signs or symptoms of lung cancer; (3) Current smoker or have quit smoking within the last 15 years; (4) You have a tobacco smoking history of at least 30 pack years (packs per day multiplied by number of years you smoked); (5) You get a written order from a healthcare provider  3  Glaucoma Screening: covered annually if you're considered high risk: (1) You have diabetes OR (2) Family history of glaucoma OR (3)  aged 48 and older OR (3)  American aged 72 and older  3  Osteoporosis Screening: covered every 2 years if you meet one of the following conditions: (1) You're estrogen deficient and at risk for osteoporosis based off medical history and other findings; (2) Have a vertebral abnormality; (3) On glucocorticoid therapy for more than 3 months; (4) Have primary hyperparathyroidism; (5) On osteoporosis medications and need to assess response to drug therapy  · Last bone density test (DXA Scan): Not on file  5  HIV Screening: covered annually if you're between the age of 12-76  Also covered annually if you are younger than 13 and older than 72 with risk factors for HIV infection  For pregnant patients, it is covered up to 3 times per pregnancy      Immunizations:  Immunization Recommendations   Influenza Vaccine Annual influenza vaccination during flu season is recommended for all persons aged >= 6 months who do not have contraindications   Pneumococcal Vaccine (Prevnar and Pneumovax)  * Prevnar = PCV13  * Pneumovax = PPSV23   Adults 25-60 years old: 1-3 doses may be recommended based on certain risk factors  Adults 72 years old: Prevnar (PCV13) vaccine recommended followed by Pneumovax (PPSV23) vaccine  If already received PPSV23 since turning 65, then PCV13 recommended at least one year after PPSV23 dose  Hepatitis B Vaccine 3 dose series if at intermediate or high risk (ex: diabetes, end stage renal disease, liver disease)   Tetanus (Td) Vaccine - COST NOT COVERED BY MEDICARE PART B Following completion of primary series, a booster dose should be given every 10 years to maintain immunity against tetanus  Td may also be given as tetanus wound prophylaxis  Tdap Vaccine - COST NOT COVERED BY MEDICARE PART B Recommended at least once for all adults  For pregnant patients, recommended with each pregnancy  Shingles Vaccine (Shingrix) - COST NOT COVERED BY MEDICARE PART B  2 shot series recommended in those aged 48 and above     Health Maintenance Due:      Topic Date Due    Cervical Cancer Screening  06/12/2023    HIV Screening  Completed    Hepatitis C Screening  Discontinued     Immunizations Due:      Topic Date Due    Hepatitis A Vaccine (1 of 2 - Risk 2-dose series) Never done    Pneumococcal Vaccine: Pediatrics (0 to 5 Years) and At-Risk Patients (6 to 59 Years) (1 of 1 - PPSV23) Never done    Hepatitis B Vaccine (1 of 3 - Risk 3-dose series) Never done     Advance Directives   What are advance directives? Advance directives are legal documents that state your wishes and plans for medical care  These plans are made ahead of time in case you lose your ability to make decisions for yourself   Advance directives can apply to any medical decision, such as the treatments you want, and if you want to donate organs  What are the types of advance directives? There are many types of advance directives, and each state has rules about how to use them  You may choose a combination of any of the following:  · Living will: This is a written record of the treatment you want  You can also choose which treatments you do not want, which to limit, and which to stop at a certain time  This includes surgery, medicine, IV fluid, and tube feedings  · Durable power of  for healthcare [facility]): This is a written record that states who you want to make healthcare choices for you when you are unable to make them for yourself  This person, called a proxy, is usually a family member or a friend  You may choose more than 1 proxy  · Do not resuscitate (DNR) order:  A DNR order is used in case your heart stops beating or you stop breathing  It is a request not to have certain forms of treatment, such as CPR  A DNR order may be included in other types of advance directives  · Medical directive: This covers the care that you want if you are in a coma, near death, or unable to make decisions for yourself  You can list the treatments you want for each condition  Treatment may include pain medicine, surgery, blood transfusions, dialysis, IV or tube feedings, and a ventilator (breathing machine)  · Values history: This document has questions about your views, beliefs, and how you feel and think about life  This information can help others choose the care that you would choose  Why are advance directives important? An advance directive helps you control your care  Although spoken wishes may be used, it is better to have your wishes written down  Spoken wishes can be misunderstood, or not followed  Treatments may be given even if you do not want them  An advance directive may make it easier for your family to make difficult choices about your care     Cigarette Smoking and Your Health   Risks to your health if you smoke:  Nicotine and other chemicals found in tobacco damage every cell in your body  Even if you are a light smoker, you have an increased risk for cancer, heart disease, and lung disease  If you are pregnant or have diabetes, smoking increases your risk for complications  Benefits to your health if you stop smoking:   · You decrease respiratory symptoms such as coughing, wheezing, and shortness of breath  · You reduce your risk for cancers of the lung, mouth, throat, kidney, bladder, pancreas, stomach, and cervix  If you already have cancer, you increase the benefits of chemotherapy  You also reduce your risk for cancer returning or a second cancer from developing  · You reduce your risk for heart disease, blood clots, heart attack, and stroke  · You reduce your risk for lung infections, and diseases such as pneumonia, asthma, chronic bronchitis, and emphysema  · Your circulation improves  More oxygen can be delivered to your body  If you have diabetes, you lower your risk for complications, such as kidney, artery, and eye diseases  You also lower your risk for nerve damage  Nerve damage can lead to amputations, poor vision, and blindness  · You improve your body's ability to heal and to fight infections  For more information and support to stop smoking:   · [employer]  Phone: 3- 717 - 343-9270  Web Address: www [employer]  Weight Management   Why it is important to manage your weight:  Being overweight increases your risk of health conditions such as heart disease, high blood pressure, type 2 diabetes, and certain types of cancer  It can also increase your risk for osteoarthritis, sleep apnea, and other respiratory problems  Aim for a slow, steady weight loss  Even a small amount of weight loss can lower your risk of health problems  How to lose weight safely:  A safe and healthy way to lose weight is to eat fewer calories and get regular exercise   You can lose up about 1 pound a week by decreasing the number of calories you eat by 500 calories each day  Healthy meal plan for weight management:  A healthy meal plan includes a variety of foods, contains fewer calories, and helps you stay healthy  A healthy meal plan includes the following:  · Eat whole-grain foods more often  A healthy meal plan should contain fiber  Fiber is the part of grains, fruits, and vegetables that is not broken down by your body  Whole-grain foods are healthy and provide extra fiber in your diet  Some examples of whole-grain foods are whole-wheat breads and pastas, oatmeal, brown rice, and bulgur  · Eat a variety of vegetables every day  Include dark, leafy greens such as spinach, kale, jasper greens, and mustard greens  Eat yellow and orange vegetables such as carrots, sweet potatoes, and winter squash  · Eat a variety of fruits every day  Choose fresh or canned fruit (canned in its own juice or light syrup) instead of juice  Fruit juice has very little or no fiber  · Eat low-fat dairy foods  Drink fat-free (skim) milk or 1% milk  Eat fat-free yogurt and low-fat cottage cheese  Try low-fat cheeses such as mozzarella and other reduced-fat cheeses  · Choose meat and other protein foods that are low in fat  Choose beans or other legumes such as split peas or lentils  Choose fish, skinless poultry (chicken or turkey), or lean cuts of red meat (beef or pork)  Before you cook meat or poultry, cut off any visible fat  · Use less fat and oil  Try baking foods instead of frying them  Add less fat, such as margarine, sour cream, regular salad dressing and mayonnaise to foods  Eat fewer high-fat foods  Some examples of high-fat foods include french fries, doughnuts, ice cream, and cakes  · Eat fewer sweets  Limit foods and drinks that are high in sugar  This includes candy, cookies, regular soda, and sweetened drinks  Exercise:  Exercise at least 30 minutes per day on most days of the week   Some examples of exercise include walking, biking, dancing, and swimming  You can also fit in more physical activity by taking the stairs instead of the elevator or parking farther away from stores  Ask your healthcare provider about the best exercise plan for you  © Copyright [employer] 2018 Information is for End User's use only and may not be sold, redistributed or otherwise used for commercial purposes   All illustrations and images included in CareNotes® are the copyrighted property of [employer] , [employer]  or [address] [place] [address]

## 2021-05-13 NOTE — PROGRESS NOTES
Assessment and Plan:     Problem List Items Addressed This Visit     None           Preventive health issues were discussed with patient, and age appropriate screening tests were ordered as noted in patient's After Visit Summary  Personalized health advice and appropriate referrals for health education or preventive services given if needed, as noted in patient's After Visit Summary       History of Present Illness:     Patient presents for Medicare Annual Wellness visit    Patient Care Team:  Duncan Oh MD as PCP - General (Internal Medicine)  Warden Sacha MD (Maternal and Fetal Medicine)  Logan Caballero MD (Maternal and Fetal Medicine)  Carina Morales MD (Maternal and Fetal Medicine)  Leo Mercedes MD (Maternal and Fetal Medicine)  Osman Hernandez MD (Maternal and Fetal Medicine)  Reginald Jimenes MD (Maternal and Fetal Medicine)     Problem List:     Patient Active Problem List   Diagnosis    Depression    Hemorrhoid    History of heroin abuse ([address] )    Renal cyst    History of hepatitis C    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, intellectual disability, and dysmorphic features ([address] )    Crohn's disease without complication ([address] )    Cigarette nicotine dependence without complication    Neck pain    Dermatitis    Psychosocial stressors    Consultation for female sterilization    Encounter for monitoring Suboxone maintenance therapy      Past Medical and Surgical History:     Past Medical History:   Diagnosis Date    Arthritis     shoulders    Crohn disease ([address] )     Depression     Drug abuse ([address] )     Ganglion cyst of wrist, right     LAST ASSESSED: [id number]    Hepatitis C     dx 15 yrs ago-2015 retested told undetectable    Hepatitis C virus infection     LAST ASSESSED: [id number]    Hordeolum externum left eye, unspecified eyelid     LAST ASSESSED: [id number]    Infectious viral hepatitis     hep c treated    Pyelonephritis pyelonephritis    Shingles     Stargardt's disease     Stargardts Disease     dx when 23years old    Tear of right scapholunate ligament     LAST ASSESSED: [id number]    Varicella     as child    Wears glasses     reading     Past Surgical History:   Procedure Laterality Date     SECTION      LAST ASSESSED: [id number]    INDUCED       SURIGCAL TREATMENT FOR     LIVER BIOPSY N/A     RI ESOPHAGOGASTRODUODENOSCOPY TRANSORAL DIAGNOSTIC N/A 2016    Procedure: ESOPHAGOGASTRODUODENOSCOPY (EGD); Surgeon: Yomi Villa MD;  [facility]; Service: Gastroenterology    RI LAP,RMV  ADNEXAL STRUCTURE Bilateral 2021    Procedure: SALPINGECTOMY, LAPAROSCOPIC;  Surgeon: Tanna Florez MD;  [facility];  Service: Gynecology    RI REVISE MEDIAN N/CARPAL TUNNEL SURG Left 2021    Procedure: CARPAL TUNNEL RELEASE;  Surgeon: Jong Eaton MD;  [facility];  Service: Orthopedics    WISDOM TOOTH EXTRACTION        Family History:     Family History   Problem Relation Age of Onset    Heart attack Mother     Cancer Mother         LEIOMYOSARCOMA    Seizures Father         EPILEPSY    Stroke Father     Diabetes Brother     No Known Problems Daughter     No Known Problems Son     Hypertension Brother     No Known Problems Brother     Kidney disease Brother     Bipolar disorder Brother     Heart defect Daughter         ASD?       Social History:     E-Cigarette/Vaping    E-Cigarette Use Never User      E-Cigarette/Vaping Substances    Nicotine No     THC No     CBD No     Flavoring No     Other No     Unknown No      Social History     Socioeconomic History    Marital status:      Spouse name: None    Number of children: None    Years of education: None    Highest education level: None   Occupational History    None   Social Needs    Financial resource strain: None    Food insecurity     Worry: None     Inability: None    Transportation needs     Medical: None     Non-medical: None   Tobacco Use    Smoking status: Current Every Day Smoker     Packs/day: 1 00     Types: Cigarettes    Smokeless tobacco: Never Used    Tobacco comment: encouraged smoking cessation   Substance and Sexual Activity    Alcohol use: No     Frequency: Never     Comment: former ETOH abuse-per pt    Drug use: No     Comment: former addict-per doctor PMH, H/O INTRAVENOUS DRUG USE IN REMISSION    Sexual activity: Yes     Partners: Male   Lifestyle    Physical activity     Days per week: None     Minutes per session: None    Stress: None   Relationships    Social connections     Talks on phone: None     Gets together: None     Attends Tenri[employer] service: None     Active member of club or organization: None     Attends meetings of clubs or organizations: None     Relationship status: None    Intimate partner violence     Fear of current or ex partner: None     Emotionally abused: None     Physically abused: None     Forced sexual activity: None   Other Topics Concern    None   Social History Narrative    DAILY CAFFEINE CONSUMPTION, 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      Medications and Allergies:     Current Outpatient Medications   Medication Sig Dispense Refill    buprenorphine-naloxone (Suboxone) 2-0 5 mg Place 3 Film (6 mg total) under the tongue daily 90 Film 1    mesalamine (PENTASA) 500 mg CR capsule Take 1 capsule (500 mg total) by mouth 3 (three) times a day 270 capsule 3    naloxone (NARCAN) 4 mg/0 1 mL nasal spray Administer 1 spray into a nostril  If no response after 2-3 minutes, give another dose in the other nostril using a new spray  1 each 1    sertraline (ZOLOFT) 100 mg tablet Take 1 tablet daily with 50 mg tablet  (Patient taking differently: Take 100 mg by mouth daily Total of 125 mg daily) 90 tablet 1    sertraline (ZOLOFT) 25 mg tablet Take 1 tablet daily with 100 mg tablet   (Patient taking differently: Take 25 mg by mouth daily Total 125 mg daily) 90 tablet 1    ibuprofen (MOTRIN) 600 mg tablet Take 1 tablet (600 mg total) by mouth every 6 (six) hours as needed for moderate pain (cramping) 30 tablet 0    oxyCODONE (ROXICODONE) 5 mg immediate release tablet Take 1 tablet (5 mg total) by mouth every 6 (six) hours as needed for severe pain for up to 6 dosesMax Daily Amount: 20 mg 6 tablet 0     No current facility-administered medications for this visit  No Known Allergies   Immunizations:     Immunization History   Administered Date(s) Administered    Influenza, injectable, quadrivalent, preservative free 0 5 mL 10/23/2019, 10/26/2020    Influenza, recombinant, quadrivalent,injectable, preservative free 03/04/2019    SARS-CoV-2 / COVID-19 mRNA IM (Moderna) 03/15/2021, 04/12/2021    Tdap 10/23/2019, 11/02/2020    Tuberculin Skin Test-PPD Intradermal 10/21/2019      Health Maintenance:         Topic Date Due    Cervical Cancer Screening  06/12/2023    HIV Screening  Completed    Hepatitis C Screening  Discontinued         Topic Date Due    Hepatitis A Vaccine (1 of 2 - Risk 2-dose series) Never done    Pneumococcal Vaccine: Pediatrics (0 to 5 Years) and At-Risk Patients (6 to 59 Years) (1 of 1 - PPSV23) Never done    Hepatitis B Vaccine (1 of 3 - Risk 3-dose series) Never done      Medicare Health Risk Assessment:     Ht 5' 9" (1 753 m)   Wt 79 5 kg (175 lb 3 2 oz)   BMI 25 87 kg/m²      Mirtha is here for her Subsequent Wellness visit  Health Risk Assessment:   Patient rates overall health as good  Patient feels that their physical health rating is slightly worse  Patient is very satisfied with their life  Eyesight was rated as same  Hearing was rated as same  Patient feels that their emotional and mental health rating is same  Patients states they are never, rarely angry  Patient states they are never, rarely unusually tired/fatigued  Pain experienced in the last 7 days has been none   Patient states that she has experienced no weight loss or gain in last 6 months  Depression Screening:   PHQ-2 Score: 0      Fall Risk Screening: In the past year, patient has experienced: no history of falling in past year      Urinary Incontinence Screening:   Patient has not leaked urine accidently in the last six months  Home Safety:  Patient does not have trouble with stairs inside or outside of their home  Patient has working smoke alarms and has no working carbon monoxide detector  Home safety hazards include: none  Nutrition:   Current diet is Regular  Medications:   Patient is not currently taking any over-the-counter supplements  Patient is able to manage medications  Activities of Daily Living (ADLs)/Instrumental Activities of Daily Living (IADLs):   Walk and transfer into and out of bed and chair?: Yes  Dress and groom yourself?: Yes    Bathe or shower yourself?: Yes    Feed yourself? Yes  Do your laundry/housekeeping?: Yes  Manage your money, pay your bills and track your expenses?: Yes  Make your own meals?: Yes    Do your own shopping?: Yes    Previous Hospitalizations:   Any hospitalizations or ED visits within the last 12 months?: Yes    How many hospitalizations have you had in the last year?: 1-2    Hospitalization Comments: 12/29/2020 Delivery     Advance Care Planning:   Living will: No    Durable POA for healthcare: No    Advanced directive: No    Advanced directive counseling given: No    Five wishes given: No    Patient declined ACP directive: Yes    End of Life Decisions reviewed with patient: No    Provider agrees with end of life decisions: Yes      Comments: Due to her young age, this is not an appropriate or relevant discussion      Cognitive Screening:   Provider or family/friend/caregiver concerned regarding cognition?: No    PREVENTIVE SCREENINGS      Cardiovascular Screening:    General: Screening Current      Diabetes Screening:     General: Screening Current      Colorectal Cancer Screening: General: Screening Not Indicated      Breast Cancer Screening:       Due for: Mammogram        Cervical Cancer Screening:    General: Screening Current      Osteoporosis Screening:    General: Screening Not Indicated      Abdominal Aortic Aneurysm (AAA) Screening:        General: Screening Not Indicated      Lung Cancer Screening:     General: Screening Not Indicated      Hepatitis C Screening:    General: Screening Not Indicated and History Hepatitis C    Screening, Brief Intervention, and Referral to Treatment (SBIRT)    Screening  Typical number of drinks in a day: 0  Typical number of drinks in a week: 0  Interpretation: Low risk drinking behavior      Single Item Drug Screening:  How often have you used an illegal drug (including marijuana) or a prescription medication for non-medical reasons in the past year? never    Single Item Drug Screen Score: 0  Interpretation: Negative screen for possible drug use disorder      Gerhardt Lapine, MD

## 2021-05-17 ENCOUNTER — TELEPHONE (OUTPATIENT)
Dept: INTERNAL MEDICINE CLINIC | Facility: CLINIC | Age: 41
End: 2021-05-17

## 2021-05-18 NOTE — TELEPHONE ENCOUNTER
[place] [employer] approved Buprenorphine-Naloxone 2-0 5 mg 3 films daily until 5/18/2022  Notified [employer]

## 2021-06-11 DIAGNOSIS — G43.109 MIGRAINE WITH AURA AND WITHOUT STATUS MIGRAINOSUS, NOT INTRACTABLE: ICD-10-CM

## 2021-06-11 RX ORDER — RIZATRIPTAN BENZOATE 10 MG/1
TABLET ORAL
Qty: 9 TABLET | Refills: 0 | Status: SHIPPED | OUTPATIENT
Start: 2021-06-11 | End: 2021-10-25 | Stop reason: SDUPTHER

## 2021-06-21 ENCOUNTER — TELEMEDICINE (OUTPATIENT)
Dept: INTERNAL MEDICINE CLINIC | Facility: CLINIC | Age: 41
End: 2021-06-21
Payer: COMMERCIAL

## 2021-06-21 ENCOUNTER — TELEPHONE (OUTPATIENT)
Dept: INTERNAL MEDICINE CLINIC | Facility: CLINIC | Age: 41
End: 2021-06-21

## 2021-06-21 DIAGNOSIS — Z79.899 ENCOUNTER FOR MONITORING SUBOXONE MAINTENANCE THERAPY: ICD-10-CM

## 2021-06-21 DIAGNOSIS — F11.20 SUBOXONE MAINTENANCE TREATMENT COMPLICATING PREGNANCY, ANTEPARTUM, THIRD TRIMESTER (HCC): ICD-10-CM

## 2021-06-21 DIAGNOSIS — O99.323 SUBOXONE MAINTENANCE TREATMENT COMPLICATING PREGNANCY, ANTEPARTUM, THIRD TRIMESTER (HCC): ICD-10-CM

## 2021-06-21 DIAGNOSIS — Z51.81 ENCOUNTER FOR MONITORING SUBOXONE MAINTENANCE THERAPY: ICD-10-CM

## 2021-06-21 DIAGNOSIS — F11.11 HISTORY OF HEROIN ABUSE (HCC): Primary | ICD-10-CM

## 2021-06-21 PROCEDURE — 99213 OFFICE O/P EST LOW 20 MIN: CPT | Performed by: INTERNAL MEDICINE

## 2021-06-21 RX ORDER — BUPRENORPHINE AND NALOXONE 2; .5 MG/1; MG/1
6 FILM, SOLUBLE BUCCAL; SUBLINGUAL DAILY
Qty: 90 FILM | Refills: 1 | Status: SHIPPED | OUTPATIENT
Start: 2021-06-21 | End: 2021-09-24 | Stop reason: SDUPTHER

## 2021-06-21 NOTE — TELEPHONE ENCOUNTER
lmom to have patient call back to make her one month Suboxone follow up with Dr Yomi Parker in office

## 2021-06-21 NOTE — ASSESSMENT & PLAN NOTE
Doing well on current dosing of Suboxone  Usually 6 mg daily but occasionally she is able to reduce her dose to 4 mg daily without difficulty    We will continue at 6 mg daily and follow-up in 1 month

## 2021-06-21 NOTE — PROGRESS NOTES
Virtual Regular Visit      Assessment/Plan:    Problem List Items Addressed This Visit        Other    History of heroin abuse ([facility] [address] ) - Primary     Stable and has not used in 17 months  Encounter for monitoring Suboxone maintenance therapy       Doing well on current dosing of Suboxone  Usually 6 mg daily but occasionally she is able to reduce her dose to 4 mg daily without difficulty  We will continue at 6 mg daily and follow-up in 1 month                    Reason for visit is   Chief Complaint   Patient presents with    Follow-up     1 m f/u visit for Suboxone maintenance therapy  She does not check her vitals at home   Virtual Regular Visit        Encounter provider Peña Richards MD    Provider located at [address] [id number]      Recent Visits  No visits were found meeting these conditions  Showing recent visits within past 7 days and meeting all other requirements  Today's Visits  Date Type Provider Dept   06/21/21 Telemedicine Peña Richards  [facility] today's visits and meeting all other requirements  Future Appointments  No visits were found meeting these conditions  Showing future appointments within next 150 days and meeting all other requirements       The patient was identified by name and date of birth  Mirtha Patel was informed that this is a telemedicine visit and that the visit is being conducted through [facility] and patient was informed that this is a secure, HIPAA-compliant platform  She agrees to proceed     My office door was closed  No one else was in the room  She acknowledged consent and understanding of privacy and security of the video platform  The patient has agreed to participate and understands they can discontinue the visit at any time  Patient is aware this is a billable service  Subjective  Mirtha Patel is a 35 y o  female seen virtually because of transportation issues in getting to the office  States she just purchased a new car and she is having some issues with the new car and has to go back to the dealer  She reports doing well with her current dosing of Suboxone at 6 mg daily and occasionally is able to reduce the dose to 4 mg without any significant adverse effects  Past Medical History:   Diagnosis Date    Arthritis     shoulders    Crohn disease ([facility] [address] )     Depression     Drug abuse ([facility] )     Ganglion cyst of wrist, right     LAST ASSESSED: [id number]    Hepatitis C     dx 15 yrs ago- retested told undetectable    Hepatitis C virus infection     LAST ASSESSED: [id number]    Hordeolum externum left eye, unspecified eyelid     LAST ASSESSED: [id number]    Infectious viral hepatitis     hep c treated    Pyelonephritis     pyelonephritis    Shingles     Stargardt's disease     Stargardts Disease     dx when 23years old    Tear of right scapholunate ligament     LAST ASSESSED: [id number]    Varicella     as child    Wears glasses     reading       Past Surgical History:   Procedure Laterality Date     SECTION      LAST ASSESSED: [id number]    INDUCED       SURIGCAL TREATMENT FOR     LIVER BIOPSY N/A     AL ESOPHAGOGASTRODUODENOSCOPY TRANSORAL DIAGNOSTIC N/A 2016    Procedure: ESOPHAGOGASTRODUODENOSCOPY (EGD); Surgeon: El Renee MD;  [facility]; Service: Gastroenterology    AL LAP,RMV  ADNEXAL STRUCTURE Bilateral 2021    Procedure: SALPINGECTOMY, LAPAROSCOPIC;  Surgeon: Letha Green MD;  [facility];  Service: Gynecology    AL REVISE MEDIAN N/CARPAL TUNNEL SURG Left 2021    Procedure: CARPAL TUNNEL RELEASE;  Surgeon:  Melania Mota MD;  [facility];  Service: Orthopedics    WISDOM TOOTH EXTRACTION         Current Outpatient Medications   Medication Sig Dispense Refill  buprenorphine-naloxone (Suboxone) 2-0 5 mg Place 3 Film (6 mg total) under the tongue daily 90 Film 1    ibuprofen (MOTRIN) 600 mg tablet Take 1 tablet (600 mg total) by mouth every 6 (six) hours as needed for moderate pain (cramping) 30 tablet 0    mesalamine (PENTASA) 500 mg CR capsule Take 1 capsule (500 mg total) by mouth 3 (three) times a day 270 capsule 3    rizatriptan (MAXALT) 10 MG tablet TAKE 1 TABLET BY MOUTH ONCE AS NEEDED FOR MIGRAINE FOR UP TO 1 DOSE MAY REPEAT IN 2 HOURS IF NEEDED 9 tablet 0    sertraline (ZOLOFT) 100 mg tablet Take 1 tablet daily with 50 mg tablet  (Patient taking differently: Take 100 mg by mouth daily Total of 125 mg daily) 90 tablet 1    sertraline (ZOLOFT) 25 mg tablet Take 1 tablet daily with 100 mg tablet  (Patient taking differently: Take 25 mg by mouth daily Total 125 mg daily) 90 tablet 1     No current facility-administered medications for this visit  No Known Allergies    Review of Systems   Constitutional: Negative  HENT: Negative  Eyes: Negative  Respiratory: Negative  Cardiovascular: Negative  Gastrointestinal: Negative  Endocrine: Negative  Genitourinary: Negative  Musculoskeletal: Negative  Allergic/Immunologic: Negative  Neurological: Negative  Hematological: Negative  Psychiatric/Behavioral: Negative  Video Exam    There were no vitals filed for this visit  Physical Exam  Vitals reviewed  Constitutional:       General: She is not in acute distress  Appearance: Normal appearance  HENT:      Head: Normocephalic and atraumatic  Eyes:      Conjunctiva/sclera: Conjunctivae normal       Pupils: Pupils are equal, round, and reactive to light  Cardiovascular:      Rate and Rhythm: Normal rate and regular rhythm  Pulmonary:      Effort: Pulmonary effort is normal  No respiratory distress  Abdominal:      General: Abdomen is flat  There is no distension     Musculoskeletal:         General: No swelling or deformity  Cervical back: Neck supple  Right lower leg: No edema  Left lower leg: No edema  Skin:     Coloration: Skin is not jaundiced  Findings: No bruising, erythema or rash  Neurological:      General: No focal deficit present  Mental Status: She is alert and oriented to person, place, and time  Mental status is at baseline  Psychiatric:         Mood and Affect: Mood normal          Behavior: Behavior normal          Thought Content: Thought content normal          Judgment: Judgment normal           I spent 15 minutes directly with the patient during this visit      VIRTUAL VISIT DISCLAIMER    Mirtha Patel acknowledges that she has consented to an online visit or consultation  She understands that the online visit is based solely on information provided by her, and that, in the absence of a face-to-face physical evaluation by the physician, the diagnosis she receives is both limited and provisional in terms of accuracy and completeness  This is not intended to replace a full medical face-to-face evaluation by the physician  Mirtha Patel understands and accepts these terms

## 2021-07-01 ENCOUNTER — OFFICE VISIT (OUTPATIENT)
Dept: INTERNAL MEDICINE CLINIC | Facility: CLINIC | Age: 41
End: 2021-07-01
Payer: COMMERCIAL

## 2021-07-01 VITALS
HEART RATE: 92 BPM | BODY MASS INDEX: 25.36 KG/M2 | OXYGEN SATURATION: 98 % | HEIGHT: 69 IN | WEIGHT: 171.2 LBS | SYSTOLIC BLOOD PRESSURE: 110 MMHG | TEMPERATURE: 98.6 F | DIASTOLIC BLOOD PRESSURE: 70 MMHG

## 2021-07-01 DIAGNOSIS — J02.8 ACUTE PHARYNGITIS DUE TO OTHER SPECIFIED ORGANISMS: Primary | ICD-10-CM

## 2021-07-01 PROCEDURE — 3008F BODY MASS INDEX DOCD: CPT | Performed by: INTERNAL MEDICINE

## 2021-07-01 PROCEDURE — 99213 OFFICE O/P EST LOW 20 MIN: CPT | Performed by: INTERNAL MEDICINE

## 2021-07-01 RX ORDER — AMOXICILLIN 500 MG/1
500 CAPSULE ORAL EVERY 8 HOURS SCHEDULED
Qty: 30 CAPSULE | Refills: 0 | Status: SHIPPED | OUTPATIENT
Start: 2021-07-01 | End: 2021-07-11

## 2021-07-01 RX ORDER — LIDOCAINE HYDROCHLORIDE 20 MG/ML
10 SOLUTION OROPHARYNGEAL 4 TIMES DAILY PRN
Qty: 200 ML | Refills: 1 | Status: SHIPPED | OUTPATIENT
Start: 2021-07-01 | End: 2021-10-25

## 2021-07-01 NOTE — PROGRESS NOTES
Assessment/Plan:    Acute pharyngitis due to other specified organisms    Clinically concerning for strep throat  Rapid strep test was negative  Will treat with amoxicillin 500 mg q 8 hours times 10 days along with viscous xylocaine gargles as needed for sore throat       Diagnoses and all orders for this visit:    Acute pharyngitis due to other specified organisms  -     amoxicillin (AMOXIL) 500 mg capsule; Take 1 capsule (500 mg total) by mouth every 8 (eight) hours for 10 days  -     Lidocaine Viscous HCl (XYLOCAINE) 2 % mucosal solution; Swish and spit 10 mL 4 (four) times a day as needed for mouth pain or discomfort          Subjective:      Patient ID: Mirtha Patel is a 35 y o  female  Patient presents to the office with a sore throat since Sunday  She denies any fever but states that it is extremely painful to eat and swallow  She also complains of some sore lymph nodes in her neck  She denies any earache  She has had no cough  No nausea or vomiting, no diarrhea  Family History   Problem Relation Age of Onset    Heart attack Mother     Cancer Mother         LEIOMYOSARCOMA    Seizures Father         EPILEPSY    Stroke Father     Diabetes Brother     No Known Problems Daughter     No Known Problems Son     Hypertension Brother     No Known Problems Brother     Kidney disease Brother     Bipolar disorder Brother     Heart defect Daughter         ASD?      Social History     Socioeconomic History    Marital status:      Spouse name: Not on file    Number of children: Not on file    Years of education: Not on file    Highest education level: Not on file   Occupational History    Not on file   Tobacco Use    Smoking status: Current Every Day Smoker     Packs/day: 1 00     Years: 20 00     Pack years: 20 00     Types: Cigarettes    Smokeless tobacco: Never Used    Tobacco comment: encouraged smoking cessation   Vaping Use    Vaping Use: Never used   Substance and Sexual Activity    Alcohol use: No     Comment: former ETOH abuse-per pt    Drug use: No     Comment: former addict-per doctor PMH, H/O INTRAVENOUS DRUG USE IN REMISSION    Sexual activity: Yes     Partners: Male   Other Topics Concern    Not on file   Social History Narrative    DAILY [employer], 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     Social Determinants of Health     Financial Resource Strain:     Difficulty of Paying Living Expenses:    Food Insecurity:     Worried About Running Out of Food in the Last Year:     Ran Out of Food in the Last Year:    Transportation Needs:     Lack of Transportation (Medical):      Lack of Transportation (Non-Medical):    Physical Activity:     Days of Exercise per Week:     Minutes of Exercise per Session:    Stress:     Feeling of Stress :    Social Connections:     Frequency of Communication with Friends and Family:     Frequency of Social Gatherings with Friends and Family:     Attends [employer] Services:     Active Member of Clubs or Organizations:     Attends Club or Organization Meetings:     Marital Status:    Intimate Partner Violence:     Fear of Current or Ex-Partner:     Emotionally Abused:     Physically Abused:     Sexually Abused:      Past Medical History:   Diagnosis Date    Arthritis     shoulders    Crohn disease ([facility] )     Depression     Drug abuse ([facility] )     Ganglion cyst of wrist, right     LAST ASSESSED: [id number]    Hepatitis C     dx 15 yrs ago-2015 retested told undetectable    Hepatitis C virus infection     LAST ASSESSED: [id number]    Hordeolum externum left eye, unspecified eyelid     LAST ASSESSED: [id number]    Infectious viral hepatitis     hep c treated    Pyelonephritis     pyelonephritis    Shingles     Stargardt's disease     Stargardts Disease     dx when 23years old    Tear of right scapholunate ligament     LAST ASSESSED: [id number]    Varicella     as child    Wears glasses     reading       Current Outpatient Medications:     buprenorphine-naloxone (Suboxone) 2-0 5 mg, Place 3 Film (6 mg total) under the tongue daily, Disp: 90 Film, Rfl: 1    ibuprofen (MOTRIN) 600 mg tablet, Take 1 tablet (600 mg total) by mouth every 6 (six) hours as needed for moderate pain (cramping), Disp: 30 tablet, Rfl: 0    mesalamine (PENTASA) 500 mg CR capsule, Take 1 capsule (500 mg total) by mouth 3 (three) times a day, Disp: 270 capsule, Rfl: 3    rizatriptan (MAXALT) 10 MG tablet, TAKE 1 TABLET BY MOUTH ONCE AS NEEDED FOR MIGRAINE FOR UP TO 1 DOSE MAY REPEAT IN 2 HOURS IF NEEDED, Disp: 9 tablet, Rfl: 0    sertraline (ZOLOFT) 100 mg tablet, Take 1 tablet daily with 50 mg tablet  (Patient taking differently: Take 100 mg by mouth daily Total of 125 mg daily), Disp: 90 tablet, Rfl: 1    sertraline (ZOLOFT) 25 mg tablet, Take 1 tablet daily with 100 mg tablet  (Patient taking differently: Take 25 mg by mouth daily Total 125 mg daily), Disp: 90 tablet, Rfl: 1    amoxicillin (AMOXIL) 500 mg capsule, Take 1 capsule (500 mg total) by mouth every 8 (eight) hours for 10 days, Disp: 30 capsule, Rfl: 0    Lidocaine Viscous HCl (XYLOCAINE) 2 % mucosal solution, Swish and spit 10 mL 4 (four) times a day as needed for mouth pain or discomfort, Disp: 200 mL, Rfl: 1  No Known Allergies  Past Surgical History:   Procedure Laterality Date     SECTION      LAST ASSESSED: [id number]    INDUCED       SURIGCAL TREATMENT FOR     LIVER BIOPSY N/A     IA ESOPHAGOGASTRODUODENOSCOPY TRANSORAL DIAGNOSTIC N/A 2016    Procedure: ESOPHAGOGASTRODUODENOSCOPY (EGD); Surgeon: Gilma Adams MD;  [facility]; Service: Gastroenterology    IA LAP,RMV  ADNEXAL STRUCTURE Bilateral 2021    Procedure: SALPINGECTOMY, LAPAROSCOPIC;  Surgeon: Avery Fountain MD;  [facility];  Service: Gynecology    IA REVISE MEDIAN N/CARPAL TUNNEL SURG Left 2021    Procedure: CARPAL TUNNEL RELEASE;  Surgeon:  Yamilet Sunshine MD; Location: [facility] OR;  Service: Orthopedics    WISDOM TOOTH EXTRACTION           Review of Systems   Constitutional: Negative  HENT: Positive for sore throat and trouble swallowing  Eyes: Negative  Respiratory: Negative  Cardiovascular: Negative  Gastrointestinal: Negative  Genitourinary: Negative  Musculoskeletal: Negative  Objective:      /70   Pulse 92   Temp 98 6 °F (37 °C) (Tympanic)   Ht 5' 8 9" (1 75 m)   Wt 77 7 kg (171 lb 3 2 oz)   SpO2 98%   BMI 25 36 kg/m²          Physical Exam  Vitals reviewed  Constitutional:       General: She is not in acute distress  Appearance: She is well-developed and normal weight  She is not ill-appearing, toxic-appearing or diaphoretic  HENT:      Head: Normocephalic and atraumatic  Right Ear: Tympanic membrane and ear canal normal       Left Ear: Tympanic membrane and ear canal normal       Nose: No congestion  Mouth/Throat:      Mouth: Mucous membranes are moist  No oral lesions (White papular lesions on the uvula, peritonsillar scripts and soft palate)  Pharynx: Uvula midline  No pharyngeal swelling or posterior oropharyngeal erythema  Tonsils: No tonsillar exudate or tonsillar abscesses  1+ on the right  1+ on the left  Eyes:      Conjunctiva/sclera: Conjunctivae normal       Pupils: Pupils are equal, round, and reactive to light  Neck:      Thyroid: No thyromegaly  Cardiovascular:      Rate and Rhythm: Normal rate  Pulmonary:      Effort: Pulmonary effort is normal  No respiratory distress  Abdominal:      General: There is no distension  Musculoskeletal:      Cervical back: Normal range of motion and neck supple  Lymphadenopathy:      Cervical: Cervical adenopathy ( submandibular adenopathy bilaterally more tender on the right than on the left) present  Skin:     General: Skin is warm and dry  Capillary Refill: Capillary refill takes 2 to 3 seconds        Coloration: Skin is not pale    Neurological:      General: No focal deficit present  Mental Status: She is alert and oriented to person, place, and time     Psychiatric:         Mood and Affect: Mood normal          Behavior: Behavior normal

## 2021-07-01 NOTE — ASSESSMENT & PLAN NOTE
Clinically concerning for strep throat  Rapid strep test was negative    Will treat with amoxicillin 500 mg q 8 hours times 10 days along with viscous xylocaine gargles as needed for sore throat

## 2021-07-08 DIAGNOSIS — F32.A DEPRESSION, UNSPECIFIED DEPRESSION TYPE: ICD-10-CM

## 2021-07-08 RX ORDER — SERTRALINE HYDROCHLORIDE 100 MG/1
TABLET, FILM COATED ORAL
Qty: 90 TABLET | Refills: 1 | Status: SHIPPED | OUTPATIENT
Start: 2021-07-08 | End: 2021-10-25 | Stop reason: SDUPTHER

## 2021-07-27 ENCOUNTER — TELEPHONE (OUTPATIENT)
Dept: INTERNAL MEDICINE CLINIC | Facility: CLINIC | Age: 41
End: 2021-07-27

## 2021-07-27 NOTE — TELEPHONE ENCOUNTER
Suboxone patient of Dr Ashley Barnett  Supposed to be seen monthly  Last suboxone appointment with Dr Ashley Barnett was on 6/21/2021  Called patient and left message asking for a call back to schedule follow up

## 2021-09-24 DIAGNOSIS — O99.323 SUBOXONE MAINTENANCE TREATMENT COMPLICATING PREGNANCY, ANTEPARTUM, THIRD TRIMESTER (HCC): ICD-10-CM

## 2021-09-24 DIAGNOSIS — F11.20 SUBOXONE MAINTENANCE TREATMENT COMPLICATING PREGNANCY, ANTEPARTUM, THIRD TRIMESTER (HCC): ICD-10-CM

## 2021-09-24 RX ORDER — BUPRENORPHINE AND NALOXONE 2; .5 MG/1; MG/1
6 FILM, SOLUBLE BUCCAL; SUBLINGUAL DAILY
Qty: 90 FILM | Refills: 1 | Status: SHIPPED | OUTPATIENT
Start: 2021-09-24 | End: 2021-10-25 | Stop reason: SDUPTHER

## 2021-10-25 ENCOUNTER — OFFICE VISIT (OUTPATIENT)
Dept: INTERNAL MEDICINE CLINIC | Facility: CLINIC | Age: 41
End: 2021-10-25
Payer: COMMERCIAL

## 2021-10-25 VITALS
HEART RATE: 86 BPM | SYSTOLIC BLOOD PRESSURE: 116 MMHG | HEIGHT: 68 IN | TEMPERATURE: 98.6 F | DIASTOLIC BLOOD PRESSURE: 78 MMHG | OXYGEN SATURATION: 97 % | BODY MASS INDEX: 25.64 KG/M2 | WEIGHT: 169.2 LBS

## 2021-10-25 DIAGNOSIS — O99.323 SUBOXONE MAINTENANCE TREATMENT COMPLICATING PREGNANCY, ANTEPARTUM, THIRD TRIMESTER (HCC): ICD-10-CM

## 2021-10-25 DIAGNOSIS — F11.20 SUBOXONE MAINTENANCE TREATMENT COMPLICATING PREGNANCY, ANTEPARTUM, THIRD TRIMESTER (HCC): ICD-10-CM

## 2021-10-25 DIAGNOSIS — F78.A9: ICD-10-CM

## 2021-10-25 DIAGNOSIS — F32.A DEPRESSION, UNSPECIFIED DEPRESSION TYPE: ICD-10-CM

## 2021-10-25 DIAGNOSIS — Z23 NEED FOR INFLUENZA VACCINATION: ICD-10-CM

## 2021-10-25 DIAGNOSIS — G43.109 MIGRAINE WITH AURA AND WITHOUT STATUS MIGRAINOSUS, NOT INTRACTABLE: ICD-10-CM

## 2021-10-25 DIAGNOSIS — Z51.81 ENCOUNTER FOR MONITORING SUBOXONE MAINTENANCE THERAPY: Primary | ICD-10-CM

## 2021-10-25 DIAGNOSIS — Q04.0: ICD-10-CM

## 2021-10-25 DIAGNOSIS — Z79.899 ENCOUNTER FOR MONITORING SUBOXONE MAINTENANCE THERAPY: Primary | ICD-10-CM

## 2021-10-25 DIAGNOSIS — Q89.7: ICD-10-CM

## 2021-10-25 DIAGNOSIS — H35.53: ICD-10-CM

## 2021-10-25 PROCEDURE — 3008F BODY MASS INDEX DOCD: CPT | Performed by: INTERNAL MEDICINE

## 2021-10-25 PROCEDURE — 90686 IIV4 VACC NO PRSV 0.5 ML IM: CPT

## 2021-10-25 PROCEDURE — 99213 OFFICE O/P EST LOW 20 MIN: CPT | Performed by: INTERNAL MEDICINE

## 2021-10-25 PROCEDURE — G0008 ADMIN INFLUENZA VIRUS VAC: HCPCS

## 2021-10-25 RX ORDER — RIZATRIPTAN BENZOATE 10 MG/1
TABLET ORAL
Qty: 9 TABLET | Refills: 5 | Status: SHIPPED | OUTPATIENT
Start: 2021-10-25 | End: 2022-06-20 | Stop reason: SDUPTHER

## 2021-10-25 RX ORDER — SERTRALINE HYDROCHLORIDE 100 MG/1
TABLET, FILM COATED ORAL
Qty: 45 TABLET | Refills: 5 | Status: SHIPPED | OUTPATIENT
Start: 2021-10-25 | End: 2021-12-06 | Stop reason: SDUPTHER

## 2021-10-25 RX ORDER — BUPRENORPHINE AND NALOXONE 2; .5 MG/1; MG/1
2 FILM, SOLUBLE BUCCAL; SUBLINGUAL DAILY
Qty: 30 FILM | Refills: 0 | Status: SHIPPED | OUTPATIENT
Start: 2021-10-25 | End: 2021-12-06 | Stop reason: SDUPTHER

## 2021-12-03 ENCOUNTER — TELEPHONE (OUTPATIENT)
Dept: OBGYN CLINIC | Facility: CLINIC | Age: 41
End: 2021-12-03

## 2021-12-06 ENCOUNTER — OFFICE VISIT (OUTPATIENT)
Dept: INTERNAL MEDICINE CLINIC | Facility: CLINIC | Age: 41
End: 2021-12-06
Payer: COMMERCIAL

## 2021-12-06 VITALS
TEMPERATURE: 97.8 F | DIASTOLIC BLOOD PRESSURE: 70 MMHG | BODY MASS INDEX: 23.92 KG/M2 | HEIGHT: 69 IN | HEART RATE: 101 BPM | OXYGEN SATURATION: 99 % | SYSTOLIC BLOOD PRESSURE: 116 MMHG | WEIGHT: 161.5 LBS

## 2021-12-06 DIAGNOSIS — F11.20 SUBOXONE MAINTENANCE TREATMENT COMPLICATING PREGNANCY, ANTEPARTUM, THIRD TRIMESTER (HCC): ICD-10-CM

## 2021-12-06 DIAGNOSIS — Z51.81 ENCOUNTER FOR MONITORING SUBOXONE MAINTENANCE THERAPY: Primary | ICD-10-CM

## 2021-12-06 DIAGNOSIS — O99.323 SUBOXONE MAINTENANCE TREATMENT COMPLICATING PREGNANCY, ANTEPARTUM, THIRD TRIMESTER (HCC): ICD-10-CM

## 2021-12-06 DIAGNOSIS — Z79.899 ENCOUNTER FOR MONITORING SUBOXONE MAINTENANCE THERAPY: Primary | ICD-10-CM

## 2021-12-06 DIAGNOSIS — F32.A DEPRESSION, UNSPECIFIED DEPRESSION TYPE: ICD-10-CM

## 2021-12-06 PROBLEM — O99.343 DEPRESSION AFFECTING PREGNANCY IN THIRD TRIMESTER, ANTEPARTUM: Status: RESOLVED | Noted: 2020-10-26 | Resolved: 2021-12-06

## 2021-12-06 PROCEDURE — 3008F BODY MASS INDEX DOCD: CPT | Performed by: INTERNAL MEDICINE

## 2021-12-06 PROCEDURE — 99214 OFFICE O/P EST MOD 30 MIN: CPT | Performed by: INTERNAL MEDICINE

## 2021-12-06 RX ORDER — VENLAFAXINE HYDROCHLORIDE 37.5 MG/1
37.5 CAPSULE, EXTENDED RELEASE ORAL
Qty: 50 CAPSULE | Refills: 0 | Status: SHIPPED | OUTPATIENT
Start: 2021-12-06 | End: 2022-01-05 | Stop reason: SDUPTHER

## 2021-12-06 RX ORDER — SERTRALINE HYDROCHLORIDE 100 MG/1
200 TABLET, FILM COATED ORAL DAILY
Qty: 60 TABLET | Refills: 5 | Status: SHIPPED | OUTPATIENT
Start: 2021-12-06 | End: 2022-06-20 | Stop reason: SDUPTHER

## 2021-12-06 RX ORDER — BUPRENORPHINE AND NALOXONE 2; .5 MG/1; MG/1
2 FILM, SOLUBLE BUCCAL; SUBLINGUAL DAILY
Qty: 30 FILM | Refills: 0 | Status: SHIPPED | OUTPATIENT
Start: 2021-12-06 | End: 2022-02-04 | Stop reason: SDUPTHER

## 2021-12-06 RX ORDER — SERTRALINE HYDROCHLORIDE 100 MG/1
TABLET, FILM COATED ORAL
Qty: 45 TABLET | Refills: 5 | Status: SHIPPED | OUTPATIENT
Start: 2021-12-06 | End: 2021-12-06 | Stop reason: SDUPTHER

## 2022-01-05 DIAGNOSIS — F32.A DEPRESSION, UNSPECIFIED DEPRESSION TYPE: ICD-10-CM

## 2022-01-05 RX ORDER — VENLAFAXINE HYDROCHLORIDE 37.5 MG/1
37.5 CAPSULE, EXTENDED RELEASE ORAL
Qty: 50 CAPSULE | Refills: 1 | Status: SHIPPED | OUTPATIENT
Start: 2022-01-05 | End: 2022-02-04 | Stop reason: SDUPTHER

## 2022-01-28 ENCOUNTER — RA CDI HCC (OUTPATIENT)
Dept: OTHER | Facility: HOSPITAL | Age: 42
End: 2022-01-28

## 2022-01-28 NOTE — PROGRESS NOTES
[facility]  coding opportunities             Chart Reviewed * (Number of) Inbasket suggestions sent to Provider: 1                  Patients insurance company: [employer] (Medicare Advantage and Commercial)

## 2022-02-04 ENCOUNTER — TELEMEDICINE (OUTPATIENT)
Dept: INTERNAL MEDICINE CLINIC | Facility: CLINIC | Age: 42
End: 2022-02-04
Payer: MEDICARE

## 2022-02-04 DIAGNOSIS — F11.20 SUBOXONE MAINTENANCE TREATMENT COMPLICATING PREGNANCY, ANTEPARTUM, THIRD TRIMESTER (HCC): ICD-10-CM

## 2022-02-04 DIAGNOSIS — Z79.899 ENCOUNTER FOR MONITORING SUBOXONE MAINTENANCE THERAPY: ICD-10-CM

## 2022-02-04 DIAGNOSIS — F32.A DEPRESSION, UNSPECIFIED DEPRESSION TYPE: ICD-10-CM

## 2022-02-04 DIAGNOSIS — Z65.8 PSYCHOSOCIAL STRESSORS: Primary | ICD-10-CM

## 2022-02-04 DIAGNOSIS — Z51.81 ENCOUNTER FOR MONITORING SUBOXONE MAINTENANCE THERAPY: ICD-10-CM

## 2022-02-04 DIAGNOSIS — O99.323 SUBOXONE MAINTENANCE TREATMENT COMPLICATING PREGNANCY, ANTEPARTUM, THIRD TRIMESTER (HCC): ICD-10-CM

## 2022-02-04 PROBLEM — Z79.891 ENCOUNTER FOR MONITORING SUBOXONE MAINTENANCE THERAPY: Status: RESOLVED | Noted: 2021-03-15 | Resolved: 2022-02-04

## 2022-02-04 PROCEDURE — 99213 OFFICE O/P EST LOW 20 MIN: CPT | Performed by: INTERNAL MEDICINE

## 2022-02-04 RX ORDER — BUPRENORPHINE AND NALOXONE 2; .5 MG/1; MG/1
2 FILM, SOLUBLE BUCCAL; SUBLINGUAL DAILY
Qty: 30 FILM | Refills: 0 | Status: SHIPPED | OUTPATIENT
Start: 2022-02-04 | End: 2022-03-16 | Stop reason: SDUPTHER

## 2022-02-04 RX ORDER — VENLAFAXINE HYDROCHLORIDE 75 MG/1
75 CAPSULE, EXTENDED RELEASE ORAL DAILY
Qty: 30 CAPSULE | Refills: 5 | Status: SHIPPED | OUTPATIENT
Start: 2022-02-04 | End: 2022-08-03

## 2022-02-04 NOTE — PROGRESS NOTES
Virtual Regular Visit    Verification of patient location:    Patient is located in the following state in which I hold an active license [place]      Assessment/Plan:    Problem List Items Addressed This Visit        Other    Depression     Venlafaxine dose changed to 75 mg daily in conjunction with sertraline 200 mg daily         Relevant Medications    venlafaxine (EFFEXOR-XR) 75 mg 24 hr capsule    Psychosocial stressors - Primary    Encounter for monitoring Suboxone maintenance therapy     Doing well on minimal dose of Suboxone of 2 mg daily  Other Visit Diagnoses     Suboxone maintenance treatment complicating pregnancy, antepartum, third trimester ([facility] )        Relevant Medications    buprenorphine-naloxone (Suboxone) 2-0 5 mg               Reason for visit is   Chief Complaint   Patient presents with    Virtual Regular Visit     1 m f/u visit for suboxone maintenance therapy  She does not check her vitals at home   Virtual Regular Visit        Encounter provider Michael Floyd MD    Provider located at [address] [address]      Recent Visits  No visits were found meeting these conditions  Showing recent visits within past 7 days and meeting all other requirements  Today's Visits  Date Type Provider Dept   02/04/22 Telemedicine Patti Wang, [address] today's visits and meeting all other requirements  Future Appointments  No visits were found meeting these conditions  Showing future appointments within next 150 days and meeting all other requirements       The patient was identified by name and date of birth  Mirtha Patel was informed that this is a telemedicine visit and that the visit is being conducted through [address] [place] [address] Now and patient was informed that this is a secure, HIPAA-compliant platform  She agrees to proceed  Kimberlyn Nichols My office door was closed  No one else was in the room  She acknowledged consent and understanding of privacy and security of the video platform  The patient has agreed to participate and understands they can discontinue the visit at any time  Patient is aware this is a billable service  Subjective  Mirtha Patel is a 35 y o  female seen virtually for follow-up visit for Suboxone maintenance  Patient is doing well  Her Suboxone dosage is down to 2 mg daily of buprenorphine  She is also doing reasonably well with the addition of venlafaxine 37 5 mg 2 tablets daily  She has no signs or symptoms of opioid withdrawal and she has no cravings or urgencies  She has been compliant with all aspects of her follow-up and maintenance program        Past Medical History:   Diagnosis Date    Arthritis     shoulders    [facility]     Depression     Depression affecting pregnancy in third trimester, antepartum 10/26/2020    Drug abuse ([facility] )     Ganglion cyst of wrist, right     LAST ASSESSED: [id number]    Gastritis     Hepatitis C     dx 15 yrs ago- retested told undetectable  FIBROSCAN 10/2016  F0-F1    Began treatment with Harvoni 3/2017    Hepatitis C virus infection     LAST ASSESSED: [id number]    History of heroin abuse ([facility] ) 2015    Description: Sober since 3/2020    Hordeolum externum left eye, unspecified eyelid     LAST ASSESSED: [id number]    Infectious viral hepatitis     hep c treated    Pyelonephritis     pyelonephritis    Shingles     Stargardt's disease     Stargardts Disease     dx when 23years old    Tear of right scapholunate ligament     LAST ASSESSED: [id number]    Varicella     as child    Wears glasses     reading       Past Surgical History:   Procedure Laterality Date     SECTION      LAST ASSESSED: [id number]    INDUCED       SURIGCAL TREATMENT FOR     LIVER BIOPSY N/A     VT ESOPHAGOGASTRODUODENOSCOPY TRANSORAL DIAGNOSTIC N/A 7/5/2016    Procedure: ESOPHAGOGASTRODUODENOSCOPY (EGD); Surgeon: Evan Melendez MD;  [facility]; Service: Gastroenterology    IL LAP,RMV  ADNEXAL STRUCTURE Bilateral 4/26/2021    Procedure: SALPINGECTOMY, LAPAROSCOPIC;  Surgeon: Sky Starks MD;  [facility];  Service: Gynecology    IL REVISE MEDIAN N/CARPAL TUNNEL SURG Left 4/2/2021    Procedure: CARPAL TUNNEL RELEASE;  Surgeon: Sweetie Baldwin MD;  [facility];  Service: Orthopedics    WISDOM TOOTH EXTRACTION         Current Outpatient Medications   Medication Sig Dispense Refill    mesalamine (PENTASA) 500 mg CR capsule Take 1 capsule (500 mg total) by mouth 3 (three) times a day 270 capsule 3    rizatriptan (MAXALT) 10 MG tablet May repeat in 2 hours if needed 9 tablet 5    sertraline (ZOLOFT) 100 mg tablet Take 2 tablets (200 mg total) by mouth daily 60 tablet 5    venlafaxine (EFFEXOR-XR) 75 mg 24 hr capsule Take 1 capsule (75 mg total) by mouth daily 30 capsule 5    buprenorphine-naloxone (Suboxone) 2-0 5 mg Place 1 Film (2 mg total) under the tongue daily 30 Film 0     No current facility-administered medications for this visit  No Known Allergies    Review of Systems   Constitutional: Negative  Negative for activity change, appetite change, chills, diaphoresis, fatigue, fever and unexpected weight change  HENT: Negative  Eyes: Negative  Respiratory: Negative  Cardiovascular: Negative  Gastrointestinal: Negative  Endocrine: Negative  Genitourinary: Negative  Musculoskeletal: Negative  Skin: Negative  Neurological: Negative  Hematological: Negative  Psychiatric/Behavioral: The patient is not nervous/anxious  Video Exam    There were no vitals filed for this visit  Physical Exam  Vitals reviewed  Constitutional:       General: She is not in acute distress  Appearance: Normal appearance  She is normal weight   She is not ill-appearing, toxic-appearing or diaphoretic  HENT:      Head: Normocephalic and atraumatic  Right Ear: External ear normal       Left Ear: External ear normal    Eyes:      Conjunctiva/sclera: Conjunctivae normal       Pupils: Pupils are equal, round, and reactive to light  Neck:      Vascular: No JVD  Trachea: No tracheal deviation  Cardiovascular:      Heart sounds: No murmur heard  Pulmonary:      Effort: Pulmonary effort is normal  No respiratory distress  Abdominal:      General: Abdomen is flat  There is no distension  Musculoskeletal:      Cervical back: No tenderness  Right lower leg: No edema  Left lower leg: No edema  Skin:     Coloration: Skin is not jaundiced  Findings: No bruising, erythema or rash  Neurological:      General: No focal deficit present  Mental Status: She is alert and oriented to person, place, and time  Mental status is at baseline  Psychiatric:         Mood and Affect: Mood normal          Behavior: Behavior normal           I spent 5 minutes directly with the patient during this visit    VIRTUAL VISIT DISCLAIMER      Mirtha Patel verbally agrees to participate in [employer]  Pt is aware that Virtual Care Services could be limited without vital signs or the ability to perform a full hands-on physical exam  Mirtha Patel understands she or the provider may request at any time to terminate the video visit and request the patient to seek care or treatment in person

## 2022-02-10 NOTE — OB LABOR/OXYTOCIN SAFETY PROGRESS
Conjuntivae and eyelids appear normal, Sclerae : White without injection Labor Progress Note - Mirtha Patel 41 y o  female MRN: [id number]    Unit/Bed#: -01 Encounter: [id number]    Dose (kelley-units/min) Oxytocin: 16 kelley-units/min  Contraction Frequency (minutes): 4  Contraction Quality: Moderate, Mild  Tachysystole: No   Cervical Dilation: 4-5        Cervical Effacement: 90  Fetal Station: 0  Baseline Rate: 135 bpm  Fetal Heart Rate: 150 BPM  FHR Category: Category I  Oxytocin Safety Progress Check: Safety check completed            Vital Signs:   Vitals:    12/29/20 2006   BP: 109/65   Pulse: 79   Resp:    Temp:    SpO2:            Notes/comments:   Pt comfortable with epidural, has a mild headache at this time  SVE unchanged at 4 5/90/0  Continue pitocin titration  Will give next dose of Tylenol when due  Plan d/w Dr Araceli Chu MD 12/29/2020 8:22 PM

## 2022-03-16 DIAGNOSIS — F11.20 SUBOXONE MAINTENANCE TREATMENT COMPLICATING PREGNANCY, ANTEPARTUM, THIRD TRIMESTER (HCC): ICD-10-CM

## 2022-03-16 DIAGNOSIS — O99.323 SUBOXONE MAINTENANCE TREATMENT COMPLICATING PREGNANCY, ANTEPARTUM, THIRD TRIMESTER (HCC): ICD-10-CM

## 2022-03-16 RX ORDER — BUPRENORPHINE AND NALOXONE 2; .5 MG/1; MG/1
2 FILM, SOLUBLE BUCCAL; SUBLINGUAL DAILY
Qty: 15 FILM | Refills: 0 | Status: SHIPPED | OUTPATIENT
Start: 2022-03-16 | End: 2022-04-01 | Stop reason: SDUPTHER

## 2022-03-30 ENCOUNTER — TELEPHONE (OUTPATIENT)
Dept: INTERNAL MEDICINE CLINIC | Facility: CLINIC | Age: 42
End: 2022-03-30

## 2022-03-30 NOTE — TELEPHONE ENCOUNTER
Pt stated her next appt on 4 1 22, she won't be able to make it   Is it okay to switch it over to virtual?

## 2022-04-01 ENCOUNTER — TELEMEDICINE (OUTPATIENT)
Dept: INTERNAL MEDICINE CLINIC | Facility: CLINIC | Age: 42
End: 2022-04-01
Payer: MEDICARE

## 2022-04-01 DIAGNOSIS — F11.20 SUBOXONE MAINTENANCE TREATMENT COMPLICATING PREGNANCY, ANTEPARTUM, THIRD TRIMESTER (HCC): ICD-10-CM

## 2022-04-01 DIAGNOSIS — O99.323 SUBOXONE MAINTENANCE TREATMENT COMPLICATING PREGNANCY, ANTEPARTUM, THIRD TRIMESTER (HCC): ICD-10-CM

## 2022-04-01 DIAGNOSIS — Z79.899 ENCOUNTER FOR MONITORING SUBOXONE MAINTENANCE THERAPY: Primary | ICD-10-CM

## 2022-04-01 DIAGNOSIS — Z51.81 ENCOUNTER FOR MONITORING SUBOXONE MAINTENANCE THERAPY: Primary | ICD-10-CM

## 2022-04-01 PROBLEM — Z30.09 CONSULTATION FOR FEMALE STERILIZATION: Status: RESOLVED | Noted: 2021-03-12 | Resolved: 2022-04-01

## 2022-04-01 PROBLEM — J02.8 ACUTE PHARYNGITIS DUE TO OTHER SPECIFIED ORGANISMS: Status: RESOLVED | Noted: 2021-07-01 | Resolved: 2022-04-01

## 2022-04-01 PROCEDURE — 99213 OFFICE O/P EST LOW 20 MIN: CPT | Performed by: INTERNAL MEDICINE

## 2022-04-01 RX ORDER — BUPRENORPHINE AND NALOXONE 2; .5 MG/1; MG/1
4 FILM, SOLUBLE BUCCAL; SUBLINGUAL DAILY
Qty: 60 FILM | Refills: 0 | Status: SHIPPED | OUTPATIENT
Start: 2022-04-01 | End: 2022-05-03 | Stop reason: SDUPTHER

## 2022-04-01 NOTE — PROGRESS NOTES
Virtual Regular Visit    Verification of patient location:    Patient is located in the following state in which I hold an active license [place]      Assessment/Plan:    Problem List Items Addressed This Visit        Other    Encounter for monitoring Suboxone maintenance therapy - Primary     Due to the increased stresses of working full-time she feels it may be precipitating some opioid withdrawal symptoms  She had increased her dose to 4 mg on her own and felt much better and feels that she would be better off with a dose change to 4 mg  I agree  Other Visit Diagnoses     Suboxone maintenance treatment complicating pregnancy, antepartum, third trimester ([facility] )        Relevant Medications    buprenorphine-naloxone (Suboxone) 2-0 5 mg               Reason for visit is   Chief Complaint   Patient presents with    Virtual Regular Visit     1 m f/u visit for suboxone maintenance therapy    Virtual Regular Visit        Encounter provider Andreia Tierney MD    Provider located at [address] [address]      Recent Visits  Date Type Provider Dept   03/30/22 Telephone Latricia Oppenheim,  [facility] recent visits within past 7 days and meeting all other requirements  Today's Visits  Date Type Provider Dept   04/01/22 Telemedicine Latricia Oppenheim,  [facility] today's visits and meeting all other requirements  Future Appointments  No visits were found meeting these conditions  Showing future appointments within next 150 days and meeting all other requirements       The patient was identified by name and date of birth   Mirtha Patel was informed that this is a telemedicine visit and that the visit is being conducted through [employer] and patient was informed that this is a secure, HIPAA-compliant platform  She agrees to proceed  [employer] would not connect the video because it recognized her phone line as a landline  My office door was closed  No one else was in the room  She acknowledged consent and understanding of privacy and security of the video platform  The patient has agreed to participate and understands they can discontinue the visit at any time  Patient is aware this is a billable service  Subjective  Mirtha Patel is a 38 y o  female seen via virtual visit because of scheduling and transportation difficulties with   Patient is evaluated via telemedicine visit  Overall she is doing well  She recently started a new job where she is working 10 hour days and finds it a little bit stressful  When she ends up coming home from work she is experiencing some symptoms that could potentially represent opioid withdrawal with muscular symptoms and some diaphoresis and feeling a little bit uneasy and anxious  She had increased her dose to taking a 2nd 2 mg film which helped to relieve the symptoms  She is thinking that because of the stress brought on by the activity of employment she may need to have a higher dose temporarily and re-evaluate in a few weeks  Past Medical History:   Diagnosis Date    Acute pharyngitis due to other specified organisms 7/1/2021    Arthritis     shoulders    Consultation for female sterilization 3/12/2021    Crohn disease ([facility] [address] )     Depression     Depression affecting pregnancy in third trimester, antepartum 10/26/2020    Drug abuse ([facility] [address] )     Ganglion cyst of wrist, right     LAST ASSESSED: [id number]    Gastritis     Hepatitis C     dx 15 yrs ago-2015 retested told undetectable  FIBROSCAN 10/2016  F0-F1    Began treatment with Harvoni 3/2017    Hepatitis C virus infection     LAST ASSESSED: [id number]    History of heroin abuse ([facility] [address] ) 2/27/2015    Description: Sober since 3/2020    Hordeolum externum left eye, unspecified eyelid     LAST ASSESSED: [id number]    Infectious viral hepatitis     hep c treated    Pyelonephritis     pyelonephritis    Shingles     Stargardt's disease     Stargardts Disease     dx when 23years old    Tear of right scapholunate ligament     LAST ASSESSED: [id number]    Varicella     as child    Wears glasses     reading       Past Surgical History:   Procedure Laterality Date     SECTION      LAST ASSESSED: [id number]    INDUCED       SURIGCAL TREATMENT FOR     LIVER BIOPSY N/A     CA ESOPHAGOGASTRODUODENOSCOPY TRANSORAL DIAGNOSTIC N/A 2016    Procedure: ESOPHAGOGASTRODUODENOSCOPY (EGD); Surgeon: Madi Borrero MD;  [facility]; Service: Gastroenterology    CA LAP,RMV  ADNEXAL STRUCTURE Bilateral 2021    Procedure: SALPINGECTOMY, LAPAROSCOPIC;  Surgeon: Elvis Trujillo MD;  [facility];  Service: Gynecology    CA REVISE MEDIAN N/CARPAL TUNNEL SURG Left 2021    Procedure: CARPAL TUNNEL RELEASE;  Surgeon: Anton Lundberg MD;  [facility];  Service: Orthopedics    WISDOM TOOTH EXTRACTION         Current Outpatient Medications   Medication Sig Dispense Refill    buprenorphine-naloxone (Suboxone) 2-0 5 mg Place 2 Film (4 mg total) under the tongue daily 60 Film 0    mesalamine (PENTASA) 500 mg CR capsule Take 1 capsule (500 mg total) by mouth 3 (three) times a day 270 capsule 3    rizatriptan (MAXALT) 10 MG tablet May repeat in 2 hours if needed 9 tablet 5    sertraline (ZOLOFT) 100 mg tablet Take 2 tablets (200 mg total) by mouth daily 60 tablet 5    venlafaxine (EFFEXOR-XR) 75 mg 24 hr capsule Take 1 capsule (75 mg total) by mouth daily 30 capsule 5     No current facility-administered medications for this visit  No Known Allergies    Review of Systems   Constitutional: Negative  Negative for activity change, appetite change, chills, diaphoresis, fatigue, fever and unexpected weight change     HENT: Negative  Eyes: Negative  Respiratory: Negative  Cardiovascular: Negative  Gastrointestinal: Negative  Endocrine: Negative  Genitourinary: Negative  Musculoskeletal: Negative  Skin: Negative  Neurological: Negative  Hematological: Negative  Psychiatric/Behavioral: The patient is not nervous/anxious  Video Exam    There were no vitals filed for this visit  Physical Exam  Vitals reviewed  Constitutional:       General: She is not in acute distress  Appearance: Normal appearance  She is normal weight  She is not ill-appearing, toxic-appearing or diaphoretic  HENT:      Head: Normocephalic and atraumatic  Right Ear: External ear normal       Left Ear: External ear normal    Eyes:      Conjunctiva/sclera: Conjunctivae normal       Pupils: Pupils are equal, round, and reactive to light  Pulmonary:      Effort: Pulmonary effort is normal  No respiratory distress  Abdominal:      General: Abdomen is flat  There is no distension  Musculoskeletal:         General: No swelling  Cervical back: Neck supple  Right lower leg: No edema  Left lower leg: No edema  Skin:     Coloration: Skin is not jaundiced  Findings: No bruising, erythema or rash  Neurological:      General: No focal deficit present  Mental Status: She is alert and oriented to person, place, and time  Mental status is at baseline  Psychiatric:         Mood and Affect: Mood normal          Behavior: Behavior normal           I spent 10 minutes directly with the patient during this visit    VIRTUAL VISIT DISCLAIMER      Mirtha Patel verbally agrees to participate in [employer]   Pt is aware that Virtual Care Services could be limited without vital signs or the ability to perform a full hands-on physical exam  Mirtha Patel understands she or the provider may request at any time to terminate the video visit and request the patient to seek care or treatment in person

## 2022-04-01 NOTE — ASSESSMENT & PLAN NOTE
Due to the increased stresses of working full-time she feels it may be precipitating some opioid withdrawal symptoms  She had increased her dose to 4 mg on her own and felt much better and feels that she would be better off with a dose change to 4 mg  I agree

## 2022-05-03 ENCOUNTER — OFFICE VISIT (OUTPATIENT)
Dept: INTERNAL MEDICINE CLINIC | Facility: CLINIC | Age: 42
End: 2022-05-03
Payer: MEDICARE

## 2022-05-03 VITALS
HEIGHT: 68 IN | BODY MASS INDEX: 24.77 KG/M2 | DIASTOLIC BLOOD PRESSURE: 76 MMHG | WEIGHT: 163.4 LBS | TEMPERATURE: 98.1 F | OXYGEN SATURATION: 98 % | SYSTOLIC BLOOD PRESSURE: 116 MMHG | HEART RATE: 80 BPM

## 2022-05-03 DIAGNOSIS — Z79.899 ENCOUNTER FOR MONITORING SUBOXONE MAINTENANCE THERAPY: Primary | ICD-10-CM

## 2022-05-03 DIAGNOSIS — Z51.81 ENCOUNTER FOR MONITORING SUBOXONE MAINTENANCE THERAPY: Primary | ICD-10-CM

## 2022-05-03 DIAGNOSIS — F11.20 SUBOXONE MAINTENANCE TREATMENT COMPLICATING PREGNANCY, ANTEPARTUM, THIRD TRIMESTER (HCC): ICD-10-CM

## 2022-05-03 DIAGNOSIS — O99.323 SUBOXONE MAINTENANCE TREATMENT COMPLICATING PREGNANCY, ANTEPARTUM, THIRD TRIMESTER (HCC): ICD-10-CM

## 2022-05-03 PROCEDURE — 99213 OFFICE O/P EST LOW 20 MIN: CPT | Performed by: INTERNAL MEDICINE

## 2022-05-03 RX ORDER — BUPRENORPHINE AND NALOXONE 2; .5 MG/1; MG/1
6 FILM, SOLUBLE BUCCAL; SUBLINGUAL DAILY
Qty: 90 FILM | Refills: 0 | Status: SHIPPED | OUTPATIENT
Start: 2022-05-03 | End: 2022-06-20 | Stop reason: SDUPTHER

## 2022-05-03 NOTE — PROGRESS NOTES
Assessment/Plan:    Encounter for monitoring Suboxone maintenance therapy  Will increase her dose to 6 mg daily  If after 1 week there is no improvement in her sweating episodes she has been instructed to decrease her sertraline to 100 mg daily and to reduce her Suboxone back to 4 mg daily    Subjective:      Patient ID: Mirtha Patel is a 38 y o  female  Patient presents the office for follow-up visit for Suboxone maintenance and monitoring  She appears to be doing well  She has no particular complaints at this time  She has been compliant in all aspects of her Suboxone maintenance and monitoring agreement  She is not experiencing any significant symptoms opioid withdrawal she complains of excessive perspiring especially when she is active at work  She is concerned that this could be a sign that she is not taking enough of the Suboxone  Review of her medications is significant for the fact that she is taking 200 mg of sertraline which may also explain her symptoms  Review of Systems   Constitutional: Positive for diaphoresis  Negative for activity change, appetite change, chills, fatigue, fever and unexpected weight change  HENT: Negative  Eyes: Negative  Respiratory: Negative  Cardiovascular: Negative  Gastrointestinal: Negative  Endocrine: Negative  Genitourinary: Negative  Musculoskeletal: Negative  Skin: Negative  Allergic/Immunologic: Negative  Neurological: Negative  Hematological: Negative  Psychiatric/Behavioral: Negative  Objective:      /76 (BP Location: Left arm, Patient Position: Sitting, Cuff Size: Standard)   Pulse 80   Temp 98 1 °F (36 7 °C) (Tympanic)   Ht 5' 8 43" (1 738 m)   Wt 74 1 kg (163 lb 6 4 oz)   SpO2 98%   BMI 24 54 kg/m²          Physical Exam  Vitals reviewed  Constitutional:       General: She is not in acute distress  Appearance: Normal appearance  She is normal weight   She is not ill-appearing, toxic-appearing or diaphoretic  HENT:      Head: Normocephalic and atraumatic  Right Ear: External ear normal       Left Ear: External ear normal    Eyes:      Conjunctiva/sclera: Conjunctivae normal       Pupils: Pupils are equal, round, and reactive to light  Cardiovascular:      Rate and Rhythm: Normal rate  Pulmonary:      Effort: Pulmonary effort is normal  No respiratory distress  Abdominal:      General: Abdomen is flat  There is no distension  Musculoskeletal:         General: No swelling  Cervical back: Neck supple  No rigidity  Right lower leg: No edema  Left lower leg: No edema  Skin:     Coloration: Skin is not jaundiced  Findings: No bruising, erythema or rash  Neurological:      General: No focal deficit present  Mental Status: She is alert and oriented to person, place, and time  Mental status is at baseline     Psychiatric:         Mood and Affect: Mood normal          Behavior: Behavior normal

## 2022-05-03 NOTE — ASSESSMENT & PLAN NOTE
Will increase her dose to 6 mg daily    If after 1 week there is no improvement in her sweating episodes she has been instructed to decrease her sertraline to 100 mg daily and to reduce her Suboxone back to 4 mg daily

## 2022-05-06 LAB
6MAM UR QL CFM: NEGATIVE NG/ML
7AMINOCLONAZEPAM UR QL CFM: NEGATIVE NG/ML
A-OH ALPRAZ UR QL CFM: NEGATIVE NG/ML
ACCEPTABLE CREAT UR QL: NORMAL MG/DL
ACCEPTIBLE SP GR UR QL: NORMAL
AMPHET UR QL CFM: NEGATIVE NG/ML
BUPRENORPHINE UR QL CFM: ABNORMAL NG/ML
BUTALBITAL UR QL CFM: NEGATIVE NG/ML
BZE UR QL CFM: NEGATIVE NG/ML
CODEINE UR QL CFM: NEGATIVE NG/ML
EDDP UR QL CFM: NEGATIVE NG/ML
EUTYLONE UR QL: NEGATIVE NG/ML
FENTANYL UR QL CFM: NEGATIVE NG/ML
GLIADIN IGG SER IA-ACNC: NEGATIVE NG/ML
HYDROCODONE UR QL CFM: NEGATIVE NG/ML
HYDROMORPHONE UR QL CFM: NEGATIVE NG/ML
LORAZEPAM UR QL CFM: NEGATIVE NG/ML
ME-PHENIDATE UR QL CFM: NEGATIVE NG/ML
MEPERIDINE UR QL CFM: NEGATIVE NG/ML
METHADONE UR QL CFM: NEGATIVE NG/ML
METHAMPHET UR QL CFM: NEGATIVE NG/ML
MORPHINE UR QL CFM: NEGATIVE NG/ML
NALTREXONE UR QL CFM: NEGATIVE NG/ML
NITRITE UR QL: NORMAL UG/ML
NORBUPRENORPHINE UR QL CFM: ABNORMAL NG/ML
NORDIAZEPAM UR QL CFM: NEGATIVE NG/ML
NORFENTANYL UR QL CFM: NEGATIVE NG/ML
NORHYDROCODONE UR QL CFM: NEGATIVE NG/ML
NORMEPERIDINE UR QL CFM: NEGATIVE NG/ML
NOROXYCODONE UR QL CFM: NEGATIVE NG/ML
OXAZEPAM UR QL CFM: NEGATIVE NG/ML
OXYCODONE UR QL CFM: NEGATIVE NG/ML
OXYMORPHONE UR QL CFM: NEGATIVE NG/ML
OXYMORPHONE UR QL CFM: NEGATIVE NG/ML
PHENOBARB UR QL CFM: NEGATIVE NG/ML
RESULT ALL_PRESCRIBED MEDS AND SPECIAL INSTRUCTIONS: NORMAL
SECOBARBITAL UR QL CFM: NEGATIVE NG/ML
SL AMB 3-METHYL-FENTANYL QUANTIFICATION: NORMAL NG/ML
SL AMB 4-ANPP QUANTIFICATION: NORMAL NG/ML
SL AMB 4-FIBF QUANTIFICATION: NORMAL NG/ML
SL AMB 5F-ADB-M7 METABOLITE QUANTIFICATION: NEGATIVE NG/ML
SL AMB 7-OH-MITRAGYNINE (KRATOM ALKALOID) QUANTIFICATION: NEGATIVE NG/ML
SL AMB AB-FUBINACA-M3 METABOLITE QUANTIFICATION: NEGATIVE NG/ML
SL AMB ACETYL FENTANYL QUANTIFICATION: NORMAL NG/ML
SL AMB ACETYL NORFENTANYL QUANTIFICATION: NORMAL NG/ML
SL AMB ACRYL FENTANYL QUANTIFICATION: NORMAL NG/ML
SL AMB BUTRYL FENTANYL QUANTIFICATION: NORMAL NG/ML
SL AMB CARFENTANIL QUANTIFICATION: NORMAL NG/ML
SL AMB CYCLOPROPYL FENTANYL QUANTIFICATION: NORMAL NG/ML
SL AMB DEXTRORPHAN (DEXTROMETHORPHAN METABOLITE) QUANT: NEGATIVE NG/ML
SL AMB FURANYL FENTANYL QUANTIFICATION: NORMAL NG/ML
SL AMB GABAPENTIN QUANTIFICATION: NEGATIVE
SL AMB JWH018 METABOLITE QUANTIFICATION: NEGATIVE NG/ML
SL AMB JWH073 METABOLITE QUANTIFICATION: NEGATIVE NG/ML
SL AMB MDMB-FUBINACA-M1 METABOLITE QUANTIFICATION: NEGATIVE NG/ML
SL AMB METHOXYACETYL FENTANYL QUANTIFICATION: NORMAL NG/ML
SL AMB METHYLONE QUANTIFICATION: NEGATIVE NG/ML
SL AMB N-DESMETHYL U-47700 QUANTIFICATION: NORMAL NG/ML
SL AMB N-DESMETHYL-TRAMADOL QUANTIFICATION: NEGATIVE NG/ML
SL AMB PHENTERMINE QUANTIFICATION: NEGATIVE NG/ML
SL AMB PREGABALIN QUANTIFICATION: NEGATIVE
SL AMB RCS4 METABOLITE QUANTIFICATION: NEGATIVE NG/ML
SL AMB RITALINIC ACID QUANTIFICATION: NEGATIVE NG/ML
SL AMB U-47700 QUANTIFICATION: NORMAL NG/ML
SMOOTH MUSCLE AB TITR SER IF: NEGATIVE NG/ML
SPECIMEN DRAWN SERPL: NEGATIVE NG/ML
SPECIMEN PH ACCEPTABLE UR: NORMAL
TAPENTADOL UR QL CFM: NEGATIVE NG/ML
TEMAZEPAM UR QL CFM: NEGATIVE NG/ML
TEMAZEPAM UR QL CFM: NEGATIVE NG/ML
TRAMADOL UR QL CFM: NEGATIVE NG/ML
URATE/CREAT 24H UR: NEGATIVE NG/ML

## 2022-06-07 ENCOUNTER — TELEPHONE (OUTPATIENT)
Dept: PSYCHIATRY | Facility: CLINIC | Age: 42
End: 2022-06-07

## 2022-06-20 ENCOUNTER — OFFICE VISIT (OUTPATIENT)
Dept: INTERNAL MEDICINE CLINIC | Facility: CLINIC | Age: 42
End: 2022-06-20
Payer: MEDICARE

## 2022-06-20 VITALS
WEIGHT: 163 LBS | OXYGEN SATURATION: 98 % | TEMPERATURE: 97.6 F | HEART RATE: 102 BPM | DIASTOLIC BLOOD PRESSURE: 72 MMHG | SYSTOLIC BLOOD PRESSURE: 110 MMHG | BODY MASS INDEX: 24.14 KG/M2 | HEIGHT: 69 IN

## 2022-06-20 DIAGNOSIS — K50.10 CROHN'S DISEASE OF LARGE INTESTINE WITHOUT COMPLICATION (HCC): ICD-10-CM

## 2022-06-20 DIAGNOSIS — F31.78 BIPOLAR DISORDER, IN FULL REMISSION, MOST RECENT EPISODE MIXED (HCC): Primary | ICD-10-CM

## 2022-06-20 DIAGNOSIS — F32.A DEPRESSION, UNSPECIFIED DEPRESSION TYPE: ICD-10-CM

## 2022-06-20 DIAGNOSIS — H35.53 STARGARDT'S DISEASE: ICD-10-CM

## 2022-06-20 DIAGNOSIS — F11.20 SUBOXONE MAINTENANCE TREATMENT COMPLICATING PREGNANCY, ANTEPARTUM, THIRD TRIMESTER (HCC): ICD-10-CM

## 2022-06-20 DIAGNOSIS — Q04.0: ICD-10-CM

## 2022-06-20 DIAGNOSIS — Q89.7: ICD-10-CM

## 2022-06-20 DIAGNOSIS — O99.323 SUBOXONE MAINTENANCE TREATMENT COMPLICATING PREGNANCY, ANTEPARTUM, THIRD TRIMESTER (HCC): ICD-10-CM

## 2022-06-20 DIAGNOSIS — H35.53: ICD-10-CM

## 2022-06-20 DIAGNOSIS — F78.A9: ICD-10-CM

## 2022-06-20 DIAGNOSIS — H10.021 PINK EYE DISEASE OF RIGHT EYE: ICD-10-CM

## 2022-06-20 DIAGNOSIS — G43.109 MIGRAINE WITH AURA AND WITHOUT STATUS MIGRAINOSUS, NOT INTRACTABLE: ICD-10-CM

## 2022-06-20 PROCEDURE — 99214 OFFICE O/P EST MOD 30 MIN: CPT | Performed by: INTERNAL MEDICINE

## 2022-06-20 RX ORDER — OLOPATADINE HYDROCHLORIDE 1 MG/ML
1 SOLUTION/ DROPS OPHTHALMIC 2 TIMES DAILY
Qty: 5 ML | Refills: 0 | Status: SHIPPED | OUTPATIENT
Start: 2022-06-20

## 2022-06-20 RX ORDER — BUPRENORPHINE AND NALOXONE 2; .5 MG/1; MG/1
4 FILM, SOLUBLE BUCCAL; SUBLINGUAL DAILY
Qty: 60 FILM | Refills: 0 | Status: SHIPPED | OUTPATIENT
Start: 2022-06-20 | End: 2022-07-18 | Stop reason: SDUPTHER

## 2022-06-20 RX ORDER — RIZATRIPTAN BENZOATE 10 MG/1
TABLET ORAL
Qty: 9 TABLET | Refills: 5 | Status: SHIPPED | OUTPATIENT
Start: 2022-06-20

## 2022-06-20 RX ORDER — SERTRALINE HYDROCHLORIDE 100 MG/1
200 TABLET, FILM COATED ORAL DAILY
Qty: 60 TABLET | Refills: 5 | Status: SHIPPED | OUTPATIENT
Start: 2022-06-20 | End: 2022-12-17

## 2022-06-20 NOTE — PROGRESS NOTES
Assessment/Plan:    Bipolar disorder, in full remission, most recent episode mixed ([facility] )  On combination of sertraline and venlafaxine    Crohn's disease without complication ([facility] )  Clinically stable  Currently asymptomatic  Migraine with aura and without status migrainosus, not intractable  Maxalt renewed    Depression  Currently stable on venlafaxine and sertraline    Stargardt's disease  Clinically asymptomatic       Diagnoses and all orders for this visit:    Bipolar disorder, in full remission, most recent episode mixed ([facility] )    Depression, unspecified depression type  -     sertraline (ZOLOFT) 100 mg tablet; Take 2 tablets (200 mg total) by mouth daily    Migraine with aura and without status migrainosus, not intractable  -     rizatriptan (MAXALT) 10 mg tablet; May repeat in 2 hours if needed    Suboxone maintenance treatment complicating pregnancy, antepartum, third trimester ([place] [address] )  -     buprenorphine-naloxone (Suboxone) 2-0 5 mg; Place 2 Film (4 mg total) under the tongue daily    Crohn's disease of large intestine without complication (HCC)    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, intellectual disability, and dysmorphic features (HCC)    Stargardt's disease    Pink eye disease of right eye  -     olopatadine (PATANOL) 0 1 % ophthalmic solution; Administer 1 drop to both eyes 2 (two) times a day          Subjective:      Patient ID: Mirtha Patel is a 38 y o  female  Patient presents for follow-up visit  Her  was recently diagnosed with pinkeye and that she is beginning to experience similar symptoms in her right eye  She wakes up with a crusty discharge in the morning  She complains of some mild irritation but denies pain  She is also here for follow-up for Suboxone maintenance and monitoring  She is doing well on her current dosing    She has decreased her dose to 4 mg daily and is doing well on that without experiencing any significant withdrawal issues      Family History   Problem Relation Age of Onset    Heart attack Mother     Cancer Mother         LEIOMYOSARCOMA    Seizures Father         EPILEPSY    Stroke Father     Diabetes Brother     No Known Problems Daughter     No Known Problems Son     Hypertension Brother     No Known Problems Brother     Kidney disease Brother     Bipolar disorder Brother     Heart defect Daughter         ASD?      Social History     Socioeconomic History    Marital status:      Spouse name: Not on file    Number of children: Not on file    Years of education: Not on file    Highest education level: Not on file   Occupational History    Not on file   Tobacco Use    Smoking status: Current Every Day Smoker     Packs/day: 1 00     Years: 20 00     Pack years: 20 00     Types: Cigarettes    Smokeless tobacco: Never Used    Tobacco comment: encouraged smoking cessation   Vaping Use    Vaping Use: Never used   Substance and Sexual Activity    Alcohol use: No     Comment: former ETOH abuse-per pt    Drug use: No     Comment: former addict-per doctor PM, H/O INTRAVENOUS DRUG USE IN REMISSION    Sexual activity: Yes     Partners: Male   Other Topics Concern    Not on file   Social History Narrative    DAILY [employer] CONSUMPTION, 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     Social Determinants of Health     Financial Resource Strain: Not on file   Food Insecurity: Not on file   Transportation Needs: Not on file   Physical Activity: Not on file   Stress: Not on file   Social Connections: Not on file   Intimate Partner Violence: Not on file   Housing Stability: Not on file     Past Medical History:   Diagnosis Date    Acute pharyngitis due to other specified organisms 7/1/2021    Arthritis     shoulders    Consultation for female sterilization 3/12/2021    Crohn disease ([facility] [address] [address] )     Depression     Depression affecting pregnancy in third trimester, antepartum 10/26/2020    Drug abuse ([facility] [address] )     Ganglion cyst of wrist, right     LAST ASSESSED: [id number]    Gastritis     Hepatitis C     dx 15 yrs ago- retested told undetectable  FIBROSCAN 10/2016  F0-F1  Began treatment with Harvoni 3/2017    Hepatitis C virus infection     LAST ASSESSED: [id number]    History of heroin abuse ([facility] [address] ) 2015    Description: Sober since 3/2020    Hordeolum externum left eye, unspecified eyelid     LAST ASSESSED: [id number]    Infectious viral hepatitis     hep c treated    Pyelonephritis     pyelonephritis    Shingles     Stargardt's disease     Stargardts Disease     dx when 23years old    Tear of right scapholunate ligament     LAST ASSESSED:     Varicella     as child    Wears glasses     reading       Current Outpatient Medications:     buprenorphine-naloxone (Suboxone) 2-0 5 mg, Place 2 Film (4 mg total) under the tongue daily, Disp: 60 Film, Rfl: 0    mesalamine (PENTASA) 500 mg CR capsule, Take 1 capsule (500 mg total) by mouth 3 (three) times a day, Disp: 270 capsule, Rfl: 3    olopatadine (PATANOL) 0 1 % ophthalmic solution, Administer 1 drop to both eyes 2 (two) times a day, Disp: 5 mL, Rfl: 0    rizatriptan (MAXALT) 10 mg tablet, May repeat in 2 hours if needed, Disp: 9 tablet, Rfl: 5    sertraline (ZOLOFT) 100 mg tablet, Take 2 tablets (200 mg total) by mouth daily, Disp: 60 tablet, Rfl: 5    venlafaxine (EFFEXOR-XR) 75 mg 24 hr capsule, Take 1 capsule (75 mg total) by mouth daily, Disp: 30 capsule, Rfl: 5  No Known Allergies  Past Surgical History:   Procedure Laterality Date     SECTION      LAST ASSESSED: [id number]    INDUCED       SURIGCAL TREATMENT FOR     LIVER BIOPSY N/A     AK ESOPHAGOGASTRODUODENOSCOPY TRANSORAL DIAGNOSTIC N/A 2016    Procedure: ESOPHAGOGASTRODUODENOSCOPY (EGD); Surgeon: Riky Abernathy MD;  [facility]: [facility];   Service: Gastroenterology    AK LAP,RMV  ADNEXAL STRUCTURE Bilateral 2021    Procedure: SALPINGECTOMY, LAPAROSCOPIC;  Surgeon: Manisha Garcia MD;  [facility];  Service: Gynecology    DE REVISE MEDIAN N/CARPAL TUNNEL SURG Left 4/2/2021    Procedure: CARPAL TUNNEL RELEASE;  Surgeon: Low St MD;  [facility];  Service: Orthopedics    WISDOM TOOTH EXTRACTION           Review of Systems   Constitutional: Negative  Negative for activity change, appetite change, chills, diaphoresis, fatigue, fever and unexpected weight change  HENT: Negative  Eyes: Positive for photophobia, discharge and redness  Respiratory: Negative  Cardiovascular: Negative  Gastrointestinal: Negative  Endocrine: Negative  Genitourinary: Negative  Musculoskeletal: Negative  Skin: Negative  Neurological: Negative  Hematological: Negative  Psychiatric/Behavioral: The patient is not nervous/anxious  Objective:      /72 (BP Location: Left arm, Patient Position: Sitting, Cuff Size: Standard)   Pulse 102   Temp 97 6 °F (36 4 °C) (Tympanic)   Ht 5' 8 5" (1 74 m)   Wt 73 9 kg (163 lb)   LMP  (LMP Unknown)   SpO2 98%   BMI 24 42 kg/m²          Physical Exam  Vitals reviewed  Constitutional:       General: She is not in acute distress  Appearance: Normal appearance  She is normal weight  She is not ill-appearing, toxic-appearing or diaphoretic  HENT:      Head: Normocephalic and atraumatic  Right Ear: External ear normal       Left Ear: External ear normal       Nose: Nose normal    Eyes:      Pupils: Pupils are equal, round, and reactive to light  Comments: Mild conjunctival hyperemia of the right eye   Cardiovascular:      Rate and Rhythm: Normal rate and regular rhythm  Pulses: Normal pulses  Heart sounds: Normal heart sounds  No murmur heard  Pulmonary:      Effort: Pulmonary effort is normal  No respiratory distress  Breath sounds: Normal breath sounds  No wheezing or rales  Abdominal:      General: Abdomen is flat  There is no distension     Musculoskeletal: General: No swelling  Cervical back: Neck supple  No rigidity  Right lower leg: No edema  Left lower leg: No edema  Skin:     General: Skin is warm  Capillary Refill: Capillary refill takes less than 2 seconds  Coloration: Skin is not jaundiced  Findings: No bruising, erythema or rash  Neurological:      General: No focal deficit present  Mental Status: She is alert and oriented to person, place, and time  Mental status is at baseline     Psychiatric:         Mood and Affect: Mood normal          Behavior: Behavior normal

## 2022-07-13 NOTE — TELEPHONE ENCOUNTER
Called pt 2x to see interest on being on med mgmt wait list if no response please remove pt off wait list

## 2022-07-18 ENCOUNTER — OFFICE VISIT (OUTPATIENT)
Dept: INTERNAL MEDICINE CLINIC | Facility: CLINIC | Age: 42
End: 2022-07-18
Payer: MEDICARE

## 2022-07-18 VITALS
BODY MASS INDEX: 24.41 KG/M2 | TEMPERATURE: 97.5 F | DIASTOLIC BLOOD PRESSURE: 70 MMHG | HEART RATE: 103 BPM | HEIGHT: 69 IN | WEIGHT: 164.8 LBS | SYSTOLIC BLOOD PRESSURE: 104 MMHG

## 2022-07-18 DIAGNOSIS — F11.20 SUBOXONE MAINTENANCE TREATMENT COMPLICATING PREGNANCY, ANTEPARTUM, THIRD TRIMESTER (HCC): ICD-10-CM

## 2022-07-18 DIAGNOSIS — Z79.899 ENCOUNTER FOR MONITORING SUBOXONE MAINTENANCE THERAPY: ICD-10-CM

## 2022-07-18 DIAGNOSIS — O99.323 SUBOXONE MAINTENANCE TREATMENT COMPLICATING PREGNANCY, ANTEPARTUM, THIRD TRIMESTER (HCC): ICD-10-CM

## 2022-07-18 DIAGNOSIS — Z51.81 ENCOUNTER FOR MONITORING SUBOXONE MAINTENANCE THERAPY: ICD-10-CM

## 2022-07-18 DIAGNOSIS — F31.78 BIPOLAR DISORDER, IN FULL REMISSION, MOST RECENT EPISODE MIXED (HCC): Primary | ICD-10-CM

## 2022-07-18 PROCEDURE — 99213 OFFICE O/P EST LOW 20 MIN: CPT | Performed by: INTERNAL MEDICINE

## 2022-07-18 RX ORDER — BUPRENORPHINE AND NALOXONE 2; .5 MG/1; MG/1
4 FILM, SOLUBLE BUCCAL; SUBLINGUAL DAILY
Qty: 60 FILM | Refills: 0 | Status: SHIPPED | OUTPATIENT
Start: 2022-07-18 | End: 2022-10-21 | Stop reason: SDUPTHER

## 2022-07-18 NOTE — PROGRESS NOTES
Assessment/Plan:    Bipolar disorder, in full remission, most recent episode mixed ([address] )  Continues on sertraline and venlafaxine  Clinically stable    Encounter for monitoring Suboxone maintenance therapy  Doing well on current dosing of 4 mg daily  Occasionally only requires 2 mg  Subjective:      Patient ID: Mirtha Patel is a 38 y o  female  Patient is seen for follow-up visit for Suboxone maintenance and monitoring  She is doing well she has a little bit stressed with some social stressors with her 25month-old and 1 of her older children preparing to go away for college along with some relationship issues  She has not felt the need for any type of illegal drug use and is doing well on her current dosing of buprenorphine 4 mg daily  There are some days where she only takes 2 mg  She had been complaining of some nausea and lightheadedness for the past to 2 or 3 days but today she is relatively asymptomatic  No history of fever or chills        Review of Systems   Constitutional: Negative  Negative for activity change, appetite change, chills, diaphoresis, fatigue, fever and unexpected weight change  HENT: Negative  Eyes: Negative  Respiratory: Negative  Cardiovascular: Negative  Gastrointestinal: Positive for nausea  Endocrine: Negative  Genitourinary: Negative  Musculoskeletal: Negative  Skin: Negative  Neurological: Positive for dizziness and light-headedness  Hematological: Negative  Psychiatric/Behavioral: The patient is not nervous/anxious  Objective:      /70 (BP Location: Left arm, Patient Position: Sitting, Cuff Size: Standard)   Pulse 103   Temp 97 5 °F (36 4 °C) (Tympanic)   Ht 5' 8 5" (1 74 m)   Wt 74 8 kg (164 lb 12 8 oz)   LMP  (LMP Unknown)   BMI 24 69 kg/m²          Physical Exam  Vitals reviewed  Constitutional:       General: She is not in acute distress  Appearance: Normal appearance  She is normal weight   She is not ill-appearing, toxic-appearing or diaphoretic  HENT:      Head: Normocephalic and atraumatic  Right Ear: External ear normal       Left Ear: External ear normal    Eyes:      Conjunctiva/sclera: Conjunctivae normal       Pupils: Pupils are equal, round, and reactive to light  Cardiovascular:      Rate and Rhythm: Normal rate  Heart sounds: No murmur heard  Pulmonary:      Effort: Pulmonary effort is normal  No respiratory distress  Abdominal:      General: Abdomen is flat  There is no distension  Musculoskeletal:         General: No swelling  Cervical back: Neck supple  No rigidity  Right lower leg: No edema  Left lower leg: No edema  Skin:     General: Skin is warm  Capillary Refill: Capillary refill takes less than 2 seconds  Coloration: Skin is not jaundiced  Findings: No bruising, erythema or rash  Neurological:      General: No focal deficit present  Mental Status: She is alert and oriented to person, place, and time  Mental status is at baseline     Psychiatric:         Mood and Affect: Mood normal          Behavior: Behavior normal

## 2022-09-12 ENCOUNTER — TELEPHONE (OUTPATIENT)
Dept: INTERNAL MEDICINE CLINIC | Facility: OTHER | Age: 42
End: 2022-09-12

## 2022-09-29 NOTE — TELEPHONE ENCOUNTER
Left message on machine for patient to call me at [facility] office. Please schedule virtual AWV before end of year.

## 2022-10-12 PROBLEM — Z00.00 MEDICARE ANNUAL WELLNESS VISIT, SUBSEQUENT: Status: RESOLVED | Noted: 2020-04-20 | Resolved: 2022-10-12

## 2022-10-21 ENCOUNTER — OFFICE VISIT (OUTPATIENT)
Dept: INTERNAL MEDICINE CLINIC | Facility: CLINIC | Age: 42
End: 2022-10-21
Payer: MEDICARE

## 2022-10-21 VITALS
BODY MASS INDEX: 25.06 KG/M2 | TEMPERATURE: 98.3 F | HEIGHT: 69 IN | SYSTOLIC BLOOD PRESSURE: 96 MMHG | DIASTOLIC BLOOD PRESSURE: 64 MMHG | WEIGHT: 169.2 LBS | HEART RATE: 89 BPM | OXYGEN SATURATION: 98 %

## 2022-10-21 DIAGNOSIS — F11.20 SUBOXONE MAINTENANCE TREATMENT COMPLICATING PREGNANCY, ANTEPARTUM, THIRD TRIMESTER (HCC): ICD-10-CM

## 2022-10-21 DIAGNOSIS — F32.A DEPRESSION, UNSPECIFIED DEPRESSION TYPE: ICD-10-CM

## 2022-10-21 DIAGNOSIS — Z79.899 ENCOUNTER FOR MONITORING SUBOXONE MAINTENANCE THERAPY: Primary | ICD-10-CM

## 2022-10-21 DIAGNOSIS — O99.323 SUBOXONE MAINTENANCE TREATMENT COMPLICATING PREGNANCY, ANTEPARTUM, THIRD TRIMESTER (HCC): ICD-10-CM

## 2022-10-21 DIAGNOSIS — Z51.81 ENCOUNTER FOR MONITORING SUBOXONE MAINTENANCE THERAPY: Primary | ICD-10-CM

## 2022-10-21 PROCEDURE — 99213 OFFICE O/P EST LOW 20 MIN: CPT | Performed by: INTERNAL MEDICINE

## 2022-10-21 RX ORDER — BUPRENORPHINE AND NALOXONE 2; .5 MG/1; MG/1
6 FILM, SOLUBLE BUCCAL; SUBLINGUAL DAILY
Qty: 90 FILM | Refills: 0 | Status: SHIPPED | OUTPATIENT
Start: 2022-10-21 | End: 2022-11-20

## 2022-10-21 RX ORDER — VENLAFAXINE HYDROCHLORIDE 75 MG/1
75 CAPSULE, EXTENDED RELEASE ORAL DAILY
Qty: 30 CAPSULE | Refills: 5 | Status: SHIPPED | OUTPATIENT
Start: 2022-10-21 | End: 2023-04-19

## 2022-10-21 NOTE — ASSESSMENT & PLAN NOTE
Has been doing well with 4 mg daily supplemented by an additional 2 mg dose in the afternoon on intermittent occasions

## 2022-10-21 NOTE — PROGRESS NOTES
Name: Linda Flor      : 1980      MRN: [id number]  Encounter Provider: Cris Hurst MD  Encounter Date: 10/21/2022   Encounter department: [address] [place] [address]     1  Encounter for monitoring Suboxone maintenance therapy  Assessment & Plan:  Has been doing well with 4 mg daily supplemented by an additional 2 mg dose in the afternoon on intermittent occasions  2  Depression, unspecified depression type  Assessment & Plan:  Doing well with continue on same medication  Venlafaxine renewed    Orders:  -     venlafaxine (EFFEXOR-XR) 75 mg 24 hr capsule; Take 1 capsule (75 mg total) by mouth daily    3  Suboxone maintenance treatment complicating pregnancy, antepartum, third trimester (RUSTca 75 )  -     buprenorphine-naloxone (Suboxone) 2-0 5 mg; Place 3 Film (6 mg total) under the tongue daily           Subjective      Patient is seen for follow-up visit for Suboxone maintenance and monitoring  She has been doing well on a base dose of 4 mg daily supplemented by an occasional afternoon dose of 2 mg  She is also in need of refills for her depression medication  She reports no problems with her depression or any worsening mood disorder  She sleeps well  Appetite is good and she otherwise feels quite well    Review of Systems   Constitutional: Negative  Negative for activity change, appetite change, chills, diaphoresis, fatigue, fever and unexpected weight change  HENT: Negative  Eyes: Negative  Respiratory: Negative  Cardiovascular: Negative  Gastrointestinal: Negative  Endocrine: Negative  Genitourinary: Negative  Musculoskeletal: Negative  Skin: Negative  Neurological: Negative  Hematological: Negative  Psychiatric/Behavioral: The patient is not nervous/anxious          Current Outpatient Medications on File Prior to Visit   Medication Sig   • mesalamine (PENTASA) 500 mg CR capsule Take 1 capsule (500 mg total) by mouth 3 (three) times a day   • rizatriptan (MAXALT) 10 mg tablet May repeat in 2 hours if needed   • sertraline (ZOLOFT) 100 mg tablet Take 2 tablets (200 mg total) by mouth daily   • [DISCONTINUED] venlafaxine (EFFEXOR-XR) 75 mg 24 hr capsule Take 1 capsule (75 mg total) by mouth daily   • olopatadine (PATANOL) 0 1 % ophthalmic solution Administer 1 drop to both eyes 2 (two) times a day   • [DISCONTINUED] buprenorphine-naloxone (Suboxone) 2-0 5 mg Place 2 Film (4 mg total) under the tongue daily       Objective     BP 96/64 (BP Location: Left arm, Patient Position: Sitting, Cuff Size: Standard)   Pulse 89   Temp 98 3 °F (36 8 °C) (Tympanic)   Ht 5' 8 58" (1 742 m)   Wt 76 7 kg (169 lb 3 2 oz)   SpO2 98%   BMI 25 29 kg/m²     Physical Exam  Vitals reviewed  Constitutional:       General: She is not in acute distress  Appearance: Normal appearance  She is normal weight  She is not ill-appearing, toxic-appearing or diaphoretic  HENT:      Head: Normocephalic and atraumatic  Right Ear: External ear normal       Left Ear: External ear normal       Nose: Nose normal    Eyes:      Conjunctiva/sclera: Conjunctivae normal       Pupils: Pupils are equal, round, and reactive to light  Cardiovascular:      Rate and Rhythm: Normal rate and regular rhythm  Heart sounds: No murmur heard  Pulmonary:      Effort: Pulmonary effort is normal  No respiratory distress  Abdominal:      General: Abdomen is flat  There is no distension  Musculoskeletal:         General: No swelling  Cervical back: Neck supple  No rigidity  Right lower leg: No edema  Left lower leg: No edema  Skin:     Coloration: Skin is not jaundiced  Findings: No bruising, erythema or rash  Neurological:      General: No focal deficit present  Mental Status: She is alert and oriented to person, place, and time  Mental status is at baseline     Psychiatric:         Mood and Affect: Mood normal          Behavior: Behavior normal        Washington Kwan MD

## 2022-10-25 ENCOUNTER — TELEPHONE (OUTPATIENT)
Dept: INTERNAL MEDICINE CLINIC | Facility: CLINIC | Age: 42
End: 2022-10-25

## 2022-10-26 ENCOUNTER — TELEPHONE (OUTPATIENT)
Dept: INTERNAL MEDICINE CLINIC | Facility: CLINIC | Age: 42
End: 2022-10-26

## 2022-10-26 ENCOUNTER — TELEPHONE (OUTPATIENT)
Dept: OBGYN CLINIC | Facility: CLINIC | Age: 42
End: 2022-10-26

## 2022-10-26 DIAGNOSIS — Z12.31 BREAST CANCER SCREENING BY MAMMOGRAM: Primary | ICD-10-CM

## 2022-10-26 NOTE — TELEPHONE ENCOUNTER
Patient returned call stating her insurance will only cover 2 films a day for rx buprenorphine-naloxone (Suboxone) 2-0 5 mg

## 2022-10-26 NOTE — TELEPHONE ENCOUNTER
Spoke to pharmacist at [facility]  Suboxone approved for 3 films daily  Quantity override of max dose of 2 daily  Notified pharmacy and patient

## 2022-10-26 NOTE — TELEPHONE ENCOUNTER
PT called would like to make an appointment ASAP to get tested for STDS as she has symptoms and other concerns, pt states she cannot wait until her schedule annual 1/26/22

## 2022-10-27 ENCOUNTER — APPOINTMENT (OUTPATIENT)
Dept: LAB | Facility: HOSPITAL | Age: 42
End: 2022-10-27
Payer: MEDICARE

## 2022-10-27 DIAGNOSIS — Z20.2 POSSIBLE EXPOSURE TO STD: Primary | ICD-10-CM

## 2022-10-27 DIAGNOSIS — Z20.2 POSSIBLE EXPOSURE TO STD: ICD-10-CM

## 2022-10-27 PROCEDURE — 87661 TRICHOMONAS VAGINALIS AMPLIF: CPT

## 2022-10-27 PROCEDURE — 86803 HEPATITIS C AB TEST: CPT

## 2022-10-27 PROCEDURE — 87340 HEPATITIS B SURFACE AG IA: CPT

## 2022-10-27 PROCEDURE — 87389 HIV-1 AG W/HIV-1&-2 AB AG IA: CPT

## 2022-10-27 PROCEDURE — 87491 CHLMYD TRACH DNA AMP PROBE: CPT

## 2022-10-27 PROCEDURE — 86592 SYPHILIS TEST NON-TREP QUAL: CPT

## 2022-10-27 PROCEDURE — 87591 N.GONORRHOEAE DNA AMP PROB: CPT

## 2022-10-27 PROCEDURE — 36415 COLL VENOUS BLD VENIPUNCTURE: CPT

## 2022-10-27 NOTE — TELEPHONE ENCOUNTER
Spoke to patient, she wants an std panel put in  Put in to [facility] lab  Told her we would be in touch with results

## 2022-10-28 LAB
C TRACH DNA SPEC QL NAA+PROBE: NEGATIVE
HBV SURFACE AG SER QL: NORMAL
HCV AB SER QL: ABNORMAL
HIV 1+2 AB+HIV1 P24 AG SERPL QL IA: NORMAL
N GONORRHOEA DNA SPEC QL NAA+PROBE: NEGATIVE
RPR SER QL: NORMAL

## 2022-10-29 LAB — T VAGINALIS RRNA SPEC QL NAA+PROBE: NEGATIVE

## 2022-11-07 ENCOUNTER — TELEPHONE (OUTPATIENT)
Dept: PSYCHIATRY | Facility: CLINIC | Age: 42
End: 2022-11-07

## 2023-01-09 ENCOUNTER — NURSE TRIAGE (OUTPATIENT)
Dept: OTHER | Facility: OTHER | Age: 43
End: 2023-01-09

## 2023-01-09 ENCOUNTER — TELEPHONE (OUTPATIENT)
Dept: INTERNAL MEDICINE CLINIC | Age: 43
End: 2023-01-09

## 2023-01-09 ENCOUNTER — OFFICE VISIT (OUTPATIENT)
Dept: INTERNAL MEDICINE CLINIC | Age: 43
End: 2023-01-09

## 2023-01-09 VITALS
HEART RATE: 96 BPM | SYSTOLIC BLOOD PRESSURE: 118 MMHG | DIASTOLIC BLOOD PRESSURE: 68 MMHG | HEIGHT: 69 IN | BODY MASS INDEX: 24.73 KG/M2 | WEIGHT: 167 LBS | OXYGEN SATURATION: 97 % | TEMPERATURE: 97.9 F

## 2023-01-09 DIAGNOSIS — Z11.2 SCREENING FOR STREPTOCOCCAL INFECTION: ICD-10-CM

## 2023-01-09 DIAGNOSIS — Z79.899 ENCOUNTER FOR MONITORING SUBOXONE MAINTENANCE THERAPY: Primary | ICD-10-CM

## 2023-01-09 DIAGNOSIS — J02.0 STREP THROAT: Primary | ICD-10-CM

## 2023-01-09 DIAGNOSIS — Z51.81 ENCOUNTER FOR MONITORING SUBOXONE MAINTENANCE THERAPY: Primary | ICD-10-CM

## 2023-01-09 LAB — S PYO AG THROAT QL: POSITIVE

## 2023-01-09 RX ORDER — BUPRENORPHINE AND NALOXONE 2; .5 MG/1; MG/1
6 FILM, SOLUBLE BUCCAL; SUBLINGUAL DAILY
Qty: 90 FILM | Refills: 0 | Status: SHIPPED | OUTPATIENT
Start: 2023-01-09

## 2023-01-09 RX ORDER — BUPRENORPHINE AND NALOXONE 2; .5 MG/1; MG/1
6 FILM, SOLUBLE BUCCAL; SUBLINGUAL DAILY
COMMUNITY
End: 2023-01-09 | Stop reason: SDUPTHER

## 2023-01-09 RX ORDER — AMOXICILLIN 875 MG/1
875 TABLET, COATED ORAL 2 TIMES DAILY
Qty: 20 TABLET | Refills: 0 | Status: SHIPPED | OUTPATIENT
Start: 2023-01-09 | End: 2023-01-19

## 2023-01-09 NOTE — PROGRESS NOTES
Assessment/Plan:    Strep throat  Rapid strep +  Start amoxil 875 mg BID x 10 days  Increase fluids, gargles  Change tooth brush 48 hours after starting abx  Call if symptoms not improved in 48 hours  Go to ER if unable to swallow or difficulty breathing       Diagnoses and all orders for this visit:    Strep throat    Other orders  -     buprenorphine-naloxone (Suboxone) 2-0 5 mg; Place 6 mg under the tongue daily Place 3 Film (6 mg total) under the tongue daily               Subjective:          Patient ID: Mirtha Patel is a 38 y o  female  Started with feeling a lump on the left side of her throat on Sat  As the day progressed she developed a sore throat and headache  She works at a  and has sick cotnacts with flu, COVID, strep within the past month  She felt feverish last evening but did not have an "acurate thermometer"  She has been gargling with salt water, honey, throat spray  She is able to eat and drink  Sipping hot things provides some relief  She had body aches last night but does not this am   She took 600 mg mortin at 7 am   She took a home COVID test last night which was negative  She had the initial COVID series and booster but did not get a flu shot this year  Sore Throat   This is a new problem  The current episode started in the past 7 days (2 days)  The problem has been unchanged  There has been no fever  The pain is at a severity of 7/10  Associated symptoms include headaches, a hoarse voice, neck pain and swollen glands  Pertinent negatives include no abdominal pain, congestion, coughing, diarrhea, ear pain, plugged ear sensation, shortness of breath, trouble swallowing or vomiting  She has had exposure to strep  She has tried gargles and NSAIDs for the symptoms  The treatment provided moderate relief         The following portions of the patient's history were reviewed and updated as appropriate: past family history, past surgical history and problem list     Review of Systems   Constitutional: Positive for fatigue  Negative for chills and fever  HENT: Positive for hoarse voice and sore throat  Negative for congestion, ear pain and trouble swallowing  Eyes: Negative for visual disturbance  Respiratory: Negative for cough, shortness of breath and wheezing  Cardiovascular: Negative for chest pain, palpitations and leg swelling  Gastrointestinal: Negative for abdominal pain, constipation, diarrhea, nausea and vomiting  Musculoskeletal: Positive for neck pain  Neurological: Positive for headaches  Negative for dizziness  Psychiatric/Behavioral: Negative for dysphoric mood  The patient is not nervous/anxious  Past Medical History:   Diagnosis Date   • Acute pharyngitis due to other specified organisms 7/1/2021   • Arthritis     shoulders   • Consultation for female sterilization 3/12/2021   • Crohn disease ([place] [address] )    • Depression    • Depression affecting pregnancy in third trimester, antepartum 10/26/2020   • Drug abuse ([place] [address] )    • Ganglion cyst of wrist, right     LAST ASSESSED: [id number]   • Gastritis    • Hepatitis C     dx 15 yrs ago-2015 retested told undetectable  FIBROSCAN 10/2016  F0-F1    Began treatment with Harvoni 3/2017   • Hepatitis C virus infection     LAST ASSESSED: [id number]   • History of heroin abuse ([place] [address] ) 2/27/2015    Description: Sober since 3/2020   • Hordeolum externum left eye, unspecified eyelid     LAST ASSESSED: [id number]   • Infectious viral hepatitis     hep c treated   • Pyelonephritis     pyelonephritis   • Shingles    • Stargardt's disease    • Stargardts Disease     dx when 23years old   • Tear of right scapholunate ligament     LAST ASSESSED: [id number]   • Varicella     as child   • Wears glasses     reading         Current Outpatient Medications:   •  buprenorphine-naloxone (Suboxone) 2-0 5 mg, Place 6 mg under the tongue daily Place 3 Film (6 mg total) under the tongue daily, Disp: , Rfl:   •  mesalamine (PENTASA) 500 mg CR capsule, Take 1 capsule (500 mg total) by mouth 3 (three) times a day, Disp: 270 capsule, Rfl: 3  •  rizatriptan (MAXALT) 10 mg tablet, May repeat in 2 hours if needed, Disp: 9 tablet, Rfl: 5  •  sertraline (ZOLOFT) 100 mg tablet, Take 2 tablets (200 mg total) by mouth daily, Disp: 60 tablet, Rfl: 5  •  venlafaxine (EFFEXOR-XR) 75 mg 24 hr capsule, Take 1 capsule (75 mg total) by mouth daily, Disp: 30 capsule, Rfl: 5  •  olopatadine (PATANOL) 0 1 % ophthalmic solution, Administer 1 drop to both eyes 2 (two) times a day, Disp: 5 mL, Rfl: 0    No Known Allergies    Social History   Past Surgical History:   Procedure Laterality Date   •  SECTION      LAST ASSESSED:    • INDUCED       SURIGCAL TREATMENT FOR    • LIVER BIOPSY N/A    • [place] ESOPHAGOGASTRODUODENOSCOPY TRANSORAL DIAGNOSTIC N/A 2016    Procedure: ESOPHAGOGASTRODUODENOSCOPY (EGD); Surgeon: El Renee MD;  Location: [facility]; Service: Gastroenterology   • [place] LAPAROSCOPY W/RMVL ADNEXAL STRUCTURES Bilateral 2021    Procedure: SALPINGECTOMY, LAPAROSCOPIC;  Surgeon: Letha Green MD;  Location: [facility];  Service: Gynecology   • [place] NEUROPLASTY &/TRANSPOS MEDIAN NRV CARPAL Leonette Proper Left 2021    Procedure: CARPAL TUNNEL RELEASE;  Surgeon: Melania Mota MD;  Location: [facility];  Service: Orthopedics   • WISDOM TOOTH EXTRACTION       Family History   Problem Relation Age of Onset   • Heart attack Mother    • Cancer Mother         LEIOMYOSARCOMA   • Seizures Father         EPILEPSY   • Stroke Father    • Diabetes Brother    • Alcohol abuse Brother    • Mental illness Brother    • No Known Problems Daughter    • No Known Problems Son    • Hypertension Brother    • No Known Problems Brother    • Kidney disease Brother    • Bipolar disorder Brother    • Heart defect Daughter         ASD?        Health Maintenance   Topic Date Due   • Pneumococcal Vaccine: Pediatrics (0 to 5 Years) and At-Risk Patients (6 to 59 Years) (1 - PCV) Never done   • PT PLAN OF CARE  05/24/2019   • COVID-19 Vaccine (3 - Booster for Moderna series) 06/07/2021   • Medicare Annual Wellness Visit (AWV)  05/13/2022   • Influenza Vaccine (1) 09/01/2022   • Cervical Cancer Screening  06/12/2023   • BMI: Adult  01/09/2024   • Hepatitis B Vaccine (1 of 3 - Risk 3-dose series) 03/01/2038   • HIV Screening  Completed   • HIB Vaccine  Aged Out   • IPV Vaccine  Aged Out   • Hepatitis A Vaccine  Aged Out   • Meningococcal ACWY Vaccine  Aged Out   • HPV Vaccine  Aged Out   • Hepatitis C Screening  Discontinued     Immunization History   Administered Date(s) Administered   • COVID-19 MODERNA VACC 0 5 ML IM 03/15/2021, 04/12/2021   • Influenza, injectable, quadrivalent, preservative free 0 5 mL 10/23/2019, 10/26/2020, 10/25/2021   • Influenza, recombinant, quadrivalent,injectable, preservative free 03/04/2019   • Tdap 10/23/2019, 11/02/2020   • Tuberculin Skin Test-PPD Intradermal 10/21/2019         Objective:  /68 (BP Location: Left arm, Patient Position: Sitting, Cuff Size: Standard)   Pulse 96   Temp 97 9 °F (36 6 °C) (Temporal)   Ht 5' 8 58" (1 742 m)   Wt 75 8 kg (167 lb)   SpO2 97% Comment: room air  BMI 24 96 kg/m²   Body mass index is 24 96 kg/m²  Physical Exam  Constitutional:       General: She is not in acute distress  Appearance: She is well-developed  She is not ill-appearing  HENT:      Head: Normocephalic and atraumatic  Right Ear: Tympanic membrane, ear canal and external ear normal       Left Ear: Tympanic membrane, ear canal and external ear normal       Nose: Nose normal       Mouth/Throat:      Mouth: Mucous membranes are moist       Pharynx: Oropharyngeal exudate and posterior oropharyngeal erythema present  Comments: 3+ tonsillar hypertrophy with exudates  MMM  Airway patent  Eyes:      Conjunctiva/sclera: Conjunctivae normal       Pupils: Pupils are equal, round, and reactive to light     Neck:      Comments: + bilateral cervical adenopathy 2 cm node palpable on the left 1 cm on the right  Cardiovascular:      Rate and Rhythm: Normal rate and regular rhythm  Heart sounds: Normal heart sounds  Pulmonary:      Effort: Pulmonary effort is normal       Breath sounds: Normal breath sounds  Musculoskeletal:      Cervical back: Normal range of motion and neck supple  Right lower leg: No edema  Left lower leg: No edema  Lymphadenopathy:      Cervical: Cervical adenopathy present  Skin:     General: Skin is warm and dry  Neurological:      Mental Status: She is alert

## 2023-01-09 NOTE — LETTER
January 9, 2023     Patient: Louann Patel  YOB: 1980  Date of Visit: 1/9/2023      To Whom it May Concern:    Mirtha Patel is under my professional care  Mirtha was seen in my office on 1/9/2023  Mirtha may return to work on 1/11/23  If you have any questions or concerns, please don't hesitate to call           Sincerely,          Miracle Torres PA-C        CC: No Recipients

## 2023-01-09 NOTE — ASSESSMENT & PLAN NOTE
Rapid strep +  Start amoxil 875 mg BID x 10 days  Increase fluids, gargles  Change tooth brush 48 hours after starting abx  Call if symptoms not improved in 48 hours  Go to ER if unable to swallow or difficulty breathing

## 2023-01-09 NOTE — TELEPHONE ENCOUNTER
Regarding: Painful lump in throat/ difficult to swallow  ----- Message from Marvel Kirk sent at 1/9/2023  8:16 AM EST -----  "I woke up yesterday with an extremely painful lump in my throat on the right side  It's really painful when I swallow  I had chills and hots   My at home covid test was negative "

## 2023-01-09 NOTE — TELEPHONE ENCOUNTER
Patient seen by Judy Mclean today  Next visit scheduled 2/3/23  Patient needs Suboxone refill sent to [facility] in [place]

## 2023-01-10 ENCOUNTER — TELEPHONE (OUTPATIENT)
Dept: INTERNAL MEDICINE CLINIC | Facility: CLINIC | Age: 43
End: 2023-01-10

## 2023-01-10 DIAGNOSIS — J02.0 STREP THROAT: Primary | ICD-10-CM

## 2023-01-10 RX ORDER — LIDOCAINE HYDROCHLORIDE 20 MG/ML
5 SOLUTION OROPHARYNGEAL 4 TIMES DAILY PRN
Qty: 100 ML | Refills: 1 | Status: SHIPPED | OUTPATIENT
Start: 2023-01-10

## 2023-01-11 NOTE — TELEPHONE ENCOUNTER
LMOM with instructions 
Patient was seen on 1/9/23 and tested positive for streph which she was prescribed rx amoxicillin (AMOXIL) 875 mg tablet  She would like to know if something can be sent for the pain until course is completed  Patient saw XIANG Vaughn and she is out of the office 
She can take over the counter tylenol or ibuprofen, and try warm salt water rinses 
5

## 2023-02-03 ENCOUNTER — HOSPITAL ENCOUNTER (OUTPATIENT)
Dept: RADIOLOGY | Facility: IMAGING CENTER | Age: 43
End: 2023-02-03

## 2023-02-03 VITALS — WEIGHT: 167 LBS | BODY MASS INDEX: 24.73 KG/M2 | HEIGHT: 69 IN

## 2023-02-03 DIAGNOSIS — Z12.31 BREAST CANCER SCREENING BY MAMMOGRAM: ICD-10-CM

## 2023-03-22 ENCOUNTER — HOSPITAL ENCOUNTER (OUTPATIENT)
Dept: MAMMOGRAPHY | Facility: CLINIC | Age: 43
Discharge: HOME | End: 2023-03-22

## 2023-03-22 ENCOUNTER — HOSPITAL ENCOUNTER (OUTPATIENT)
Dept: ULTRASOUND IMAGING | Facility: CLINIC | Age: 43
Discharge: HOME | End: 2023-03-22

## 2023-03-22 DIAGNOSIS — R92.8 ABNORMAL SCREENING MAMMOGRAM: ICD-10-CM

## 2023-03-24 DIAGNOSIS — N63.12 MASS OF UPPER INNER QUADRANT OF RIGHT BREAST: Primary | ICD-10-CM

## 2023-03-24 DIAGNOSIS — Z09 FOLLOW-UP EXAM: ICD-10-CM

## 2023-03-31 ENCOUNTER — OFFICE VISIT (OUTPATIENT)
Dept: INTERNAL MEDICINE CLINIC | Facility: OTHER | Age: 43
End: 2023-03-31

## 2023-03-31 DIAGNOSIS — Z12.31 ENCOUNTER FOR SCREENING MAMMOGRAM FOR BREAST CANCER: ICD-10-CM

## 2023-03-31 DIAGNOSIS — Z79.899 ENCOUNTER FOR MONITORING SUBOXONE MAINTENANCE THERAPY: ICD-10-CM

## 2023-03-31 DIAGNOSIS — Z51.81 ENCOUNTER FOR MONITORING SUBOXONE MAINTENANCE THERAPY: ICD-10-CM

## 2023-03-31 DIAGNOSIS — Z00.00 MEDICARE ANNUAL WELLNESS VISIT, SUBSEQUENT: ICD-10-CM

## 2023-03-31 DIAGNOSIS — F31.78 BIPOLAR DISORDER, IN FULL REMISSION, MOST RECENT EPISODE MIXED (HCC): ICD-10-CM

## 2023-03-31 DIAGNOSIS — O99.323 SUBOXONE MAINTENANCE TREATMENT COMPLICATING PREGNANCY, ANTEPARTUM, THIRD TRIMESTER (HCC): ICD-10-CM

## 2023-03-31 DIAGNOSIS — Z13.1 SCREENING FOR DIABETES MELLITUS: ICD-10-CM

## 2023-03-31 DIAGNOSIS — F11.20 SUBOXONE MAINTENANCE TREATMENT COMPLICATING PREGNANCY, ANTEPARTUM, THIRD TRIMESTER (HCC): ICD-10-CM

## 2023-03-31 DIAGNOSIS — Z12.4 SCREENING FOR CERVICAL CANCER: ICD-10-CM

## 2023-03-31 DIAGNOSIS — K50.10 CROHN'S DISEASE OF LARGE INTESTINE WITHOUT COMPLICATION (HCC): ICD-10-CM

## 2023-03-31 DIAGNOSIS — Z13.6 SCREENING FOR CARDIOVASCULAR CONDITION: ICD-10-CM

## 2023-03-31 DIAGNOSIS — F32.A DEPRESSION, UNSPECIFIED DEPRESSION TYPE: ICD-10-CM

## 2023-03-31 DIAGNOSIS — G43.109 MIGRAINE WITH AURA AND WITHOUT STATUS MIGRAINOSUS, NOT INTRACTABLE: Primary | ICD-10-CM

## 2023-03-31 PROBLEM — L30.9 DERMATITIS: Status: RESOLVED | Noted: 2020-09-02 | Resolved: 2023-03-31

## 2023-03-31 PROBLEM — J02.0 STREP THROAT: Status: RESOLVED | Noted: 2023-01-09 | Resolved: 2023-03-31

## 2023-03-31 NOTE — PROGRESS NOTES
Assessment and Plan:     Problem List Items Addressed This Visit        Cardiovascular and Mediastinum    Migraine with aura and without status migrainosus, not intractable - Primary     Stable  Continue venlafaxine for migraine prevention and continue rizatriptan for breakthrough headaches  Relevant Orders    CBC       Other    Depression     Stable, controlled  Continue Zoloft 200 mg daily and venlafaxine 75 mg daily  Relevant Orders    CBC    Crohn's disease without complication ([facility] )     Continue mesalamine and follow-up with gastroenterology  Suboxone maintenance treatment complicating pregnancy, antepartum, third trimester ([facility] )    Encounter for monitoring Suboxone maintenance therapy     Continue Suboxone and continue monthly follow-ups  Bipolar disorder, in full remission, most recent episode mixed ([facility] )    Relevant Orders    CBC   Other Visit Diagnoses     Medicare annual wellness visit, subsequent        Screening for diabetes mellitus        Relevant Orders    Comprehensive metabolic panel    Lipid panel    Screening for cardiovascular condition        Encounter for screening mammogram for breast cancer        Screening for cervical cancer              Tobacco Cessation Counseling: Tobacco cessation counseling was provided  The patient is sincerely urged to quit consumption of tobacco  She is not ready to quit tobacco  Medication options and side effects of medication discussed  Patient refused medication  Preventive health issues were discussed with patient, and age appropriate screening tests were ordered as noted in patient's After Visit Summary  Personalized health advice and appropriate referrals for health education or preventive services given if needed, as noted in patient's After Visit Summary  History of Present Illness:     Patient presents for a Medicare Wellness Visit    40-year-old female seen today for follow-up of chronic conditions    Recent laboratory studies reviewed today  She has been compliant with her medication regimen  She has no active complaints or concerns at this time  Suboxone maintenance therapy: She has been compliant with Suboxone  Anxiety  Presents for follow-up visit  Patient reports no chest pain, dizziness, nausea, palpitations, shortness of breath or suicidal ideas  Symptoms occur rarely  The quality of sleep is good  Nighttime awakenings: none  Compliance with medications is %  Migraine  This is a chronic problem  The current episode started more than 1 year ago  The problem occurs monthly  The problem is unchanged  Pertinent negatives include no abdominal pain, coughing, diarrhea, dizziness, fever, nausea, numbness, rhinorrhea, sinus pressure, sore throat, vomiting or weakness  Past treatments include antidepressants  The treatment provided moderate relief  Patient Care Team:  Es Poole MD as PCP - General (Internal Medicine)  Blanca Chi MD (Maternal and Fetal Medicine)  Sonny Joseph MD (Maternal and Fetal Medicine)  Wayne Corbett MD (Maternal and Fetal Medicine)  Brodie So MD (Maternal and Fetal Medicine)  Santosh Estrada MD (Maternal and Fetal Medicine)  Guillermo Cobb MD (Maternal and Fetal Medicine)     Review of Systems:     Review of Systems   Constitutional: Negative for activity change, appetite change, chills, diaphoresis, fatigue and fever  HENT: Negative for congestion, postnasal drip, rhinorrhea, sinus pressure, sinus pain, sneezing and sore throat  Eyes: Negative for visual disturbance  Respiratory: Negative for apnea, cough, choking, chest tightness, shortness of breath and wheezing  Cardiovascular: Negative for chest pain, palpitations and leg swelling  Gastrointestinal: Negative for abdominal distention, abdominal pain, anal bleeding, blood in stool, constipation, diarrhea, nausea and vomiting     Endocrine: Negative for cold intolerance and heat intolerance  Genitourinary: Negative for difficulty urinating, dysuria and hematuria  Musculoskeletal: Negative  Skin: Negative  Neurological: Negative for dizziness, weakness, light-headedness, numbness and headaches  Hematological: Negative for adenopathy  Psychiatric/Behavioral: Negative for agitation, sleep disturbance and suicidal ideas  All other systems reviewed and are negative  Problem List:     Patient Active Problem List   Diagnosis   • Depression   • Hemorrhoid   • Renal cyst   • History of hepatitis C   • Crohn's disease without complication (HCC)   • Cigarette nicotine dependence without complication   • Neck pain   • Psychosocial stressors   • Suboxone maintenance treatment complicating pregnancy, antepartum, third trimester ([facility] )   • Encounter for monitoring Suboxone maintenance therapy   • Migraine with aura and without status migrainosus, not intractable   • Bipolar disorder, in full remission, most recent episode mixed ([place] [address] )   • Stargardt's disease      Past Medical and Surgical History:     Past Medical History:   Diagnosis Date   • Acute pharyngitis due to other specified organisms 7/1/2021   • Arthritis     shoulders   • Consultation for female sterilization 3/12/2021   • Crohn disease ([place] [address] )    • Depression    • Depression affecting pregnancy in third trimester, antepartum 10/26/2020   • Drug abuse ([place] [address] )    • Ganglion cyst of wrist, right     LAST ASSESSED: [id number]   • Gastritis    • Hepatitis C     dx 15 yrs ago-2015 retested told undetectable  FIBROSCAN 10/2016  F0-F1    Began treatment with [facility] 3/2017   • Hepatitis C virus infection     LAST ASSESSED: [id number]   • History of heroin abuse ([place] [address] ) 2/27/2015    Description: Sober since 3/2020   • Hordeolum externum left eye, unspecified eyelid     LAST ASSESSED: [id number]   • Infectious viral hepatitis     hep c treated   • Pyelonephritis     pyelonephritis   • Shingles    • Stargardt's disease • Stargardts Disease     dx when 23years old   • Tear of right scapholunate ligament     LAST ASSESSED: [id number]   • Varicella     as child   • Wears glasses     reading     Past Surgical History:   Procedure Laterality Date   •  SECTION      LAST ASSESSED:    • INDUCED       SURIGCAL TREATMENT FOR    • LIVER BIOPSY N/A    • FL ESOPHAGOGASTRODUODENOSCOPY TRANSORAL DIAGNOSTIC N/A 2016    Procedure: ESOPHAGOGASTRODUODENOSCOPY (EGD); Surgeon: Mary Mcfadden MD;  Location: [facility]; Service: Gastroenterology   • FL LAPAROSCOPY W/RMVL ADNEXAL STRUCTURES Bilateral 2021    Procedure: SALPINGECTOMY, LAPAROSCOPIC;  Surgeon: Loral Cushing, MD;  [facility];  Service: Gynecology   • FL NEUROPLASTY &/TRANSPOS MEDIAN NRV CARPAL Jayne Hoyles Left 2021    Procedure: CARPAL TUNNEL RELEASE;  Surgeon: Nan Fontana MD;  Location: [facility];  Service: Orthopedics   • WISDOM TOOTH EXTRACTION        Family History:     Family History   Problem Relation Age of Onset   • Heart attack Mother    • Cancer Mother         LEIOMYOSARCOMA   • Seizures Father         EPILEPSY   • Stroke Father    • No Known Problems Daughter    • Heart defect Daughter         ASD?    • Diabetes Brother    • Alcohol abuse Brother    • Mental illness Brother    • Hypertension Brother    • No Known Problems Brother    • Kidney disease Brother    • Bipolar disorder Brother    • No Known Problems Son    • No Known Problems Son    • No Known Problems Maternal Aunt    • No Known Problems Maternal Aunt    • No Known Problems Paternal Aunt       Social History:     Social History     Socioeconomic History   • Marital status:      Spouse name: None   • Number of children: None   • Years of education: None   • Highest education level: None   Occupational History   • None   Tobacco Use   • Smoking status: Every Day     Packs/day: 1 00     Years: 20 00     Pack years: 20 00     Types: Cigarettes     Start date: 1996   • Smokeless tobacco: Never   • Tobacco comments:     encouraged smoking cessation   Vaping Use   • Vaping Use: Never used   Substance and Sexual Activity   • Alcohol use: No     Comment: former ETOH abuse-per pt   • Drug use: No     Comment: former addict-per doctor PM, H/O INTRAVENOUS DRUG USE IN REMISSION   • Sexual activity: Yes     Partners: Male   Other Topics Concern   • None   Social History Narrative    DAILY CAFFEINE CONSUMPTION, 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     Social Determinants of Health     Financial Resource Strain: Not on file   Food Insecurity: Not on file   Transportation Needs: Not on file   Physical Activity: Not on file   Stress: Not on file   Social Connections: Not on file   Intimate Partner Violence: Not on file   Housing Stability: Not on file      Medications and Allergies:     Current Outpatient Medications   Medication Sig Dispense Refill   • buprenorphine-naloxone (Suboxone) 2-0 5 mg Place 3 Film (6 mg total) under the tongue daily Place 3 Film (6 mg total) under the tongue daily 90 Film 0   • mesalamine (PENTASA) 500 mg CR capsule Take 1 capsule (500 mg total) by mouth 3 (three) times a day 270 capsule 3   • olopatadine (PATANOL) 0 1 % ophthalmic solution Administer 1 drop to both eyes 2 (two) times a day 5 mL 0   • rizatriptan (MAXALT) 10 mg tablet May repeat in 2 hours if needed 9 tablet 5   • sertraline (ZOLOFT) 100 mg tablet Take 2 tablets (200 mg total) by mouth daily 60 tablet 5   • venlafaxine (EFFEXOR-XR) 75 mg 24 hr capsule Take 1 capsule (75 mg total) by mouth daily 30 capsule 5     No current facility-administered medications for this visit       No Known Allergies   Immunizations:     Immunization History   Administered Date(s) Administered   • COVID-19 MODERNA VACC 0 5 ML IM 03/15/2021, 04/12/2021   • Influenza, injectable, quadrivalent, preservative free 0 5 mL 10/23/2019, 10/26/2020, 10/25/2021   • Influenza, recombinant, quadrivalent,injectable, preservative free 03/04/2019   • Tdap 10/23/2019, 11/02/2020   • Tuberculin Skin Test-PPD Intradermal 10/21/2019      Health Maintenance:         Topic Date Due   • Colorectal Cancer Screening  03/01/2023   • Cervical Cancer Screening  06/12/2023   • HIV Screening  Completed   • Hepatitis C Screening  Discontinued         Topic Date Due   • Pneumococcal Vaccine: Pediatrics (0 to 5 Years) and At-Risk Patients (6 to 59 Years) (1 - PCV) Never done   • COVID-19 Vaccine (3 - Booster for Moderna series) 06/07/2021   • Influenza Vaccine (1) 09/01/2022      Medicare Screening Tests and Risk Assessments:     Mirtha is here for her Subsequent Wellness visit  Last Medicare Wellness visit information reviewed, patient interviewed and updates made to the record today  Health Risk Assessment:   Patient rates overall health as good  Patient feels that their physical health rating is same  Patient is very satisfied with their life  Eyesight was rated as same  Hearing was rated as same  Patient feels that their emotional and mental health rating is much better  Patients states they are sometimes angry  Patient states they are sometimes unusually tired/fatigued  Pain experienced in the last 7 days has been none  Patient states that she has experienced no weight loss or gain in last 6 months  Depression Screening:   PHQ-9 Score: 0      Fall Risk Screening: In the past year, patient has experienced: no history of falling in past year      Urinary Incontinence Screening:   Patient has not leaked urine accidently in the last six months  Home Safety:  Patient does not have trouble with stairs inside or outside of their home  Patient has working smoke alarms and has no working carbon monoxide detector  Home safety hazards include: none  Nutrition:   Current diet is Regular  Medications:   Patient is not currently taking any over-the-counter supplements  Patient is able to manage medications       Activities of Daily Living (ADLs)/Instrumental Activities of Daily Living (IADLs):   Walk and transfer into and out of bed and chair?: Yes  Dress and groom yourself?: Yes    Bathe or shower yourself?: Yes    Feed yourself? Yes  Do your laundry/housekeeping?: Yes  Manage your money, pay your bills and track your expenses?: Yes  Make your own meals?: Yes    Do your own shopping?: Yes    Previous Hospitalizations:   Any hospitalizations or ED visits within the last 12 months?: No      Advance Care Planning:   Living will: No    Durable POA for healthcare: No    Advanced directive counseling given: Yes    End of Life Decisions reviewed with patient: Yes    Provider agrees with end of life decisions: Yes      Cognitive Screening:   Provider or family/friend/caregiver concerned regarding cognition?: No    PREVENTIVE SCREENINGS      Cardiovascular Screening:    General: Screening Current and Risks and Benefits Discussed    Due for: Lipid Panel      Diabetes Screening:     General: Risks and Benefits Discussed    Due for: Blood Glucose      Colorectal Cancer Screening:     General: Screening Current      Breast Cancer Screening:     General: Screening Current and Risks and Benefits Discussed      Cervical Cancer Screening:    General: Screening Current and Risks and Benefits Discussed      Osteoporosis Screening:    General: Screening Not Indicated      Abdominal Aortic Aneurysm (AAA) Screening:        General: Screening Not Indicated      Lung Cancer Screening:     General: Screening Not Indicated and Risks and Benefits Discussed      Hepatitis C Screening:    General: Screening Not Indicated and History Hepatitis C    Screening, Brief Intervention, and Referral to Treatment (SBIRT)      Brief Intervention  Alcohol & drug use screenings were reviewed  No concerns regarding substance use disorder identified       Review of Current Opioid Use    Opioid Risk Tool (ORT) Interpretation: Complete Opioid Risk Tool (ORT)    Other Counseling Topics: Car/seat belt/driving safety, skin self-exam, sunscreen and calcium and vitamin D intake and regular weightbearing exercise  No results found  Physical Exam:     There were no vitals taken for this visit  Physical Exam  Vitals and nursing note reviewed  It was my intent to perform this visit via video technology but the patient was not able to do a video connection so the visit was completed via audio telephone only  Suhail Gonzalez MD  Virtual AWV Consent    Verification of patient location:    Patient is located in the following state in which I hold an active license [place]    Reason for visit is follow-up and AWV    Encounter provider Suhail Gonzalez MD    Provider located at [address] [place] [id number]      Recent Visits  No visits were found meeting these conditions  Showing recent visits within past 7 days and meeting all other requirements  Today's Visits  Date Type Provider Dept   03/31/23 Office Visit Suhail Gonzalez MD [facility]   Showing today's visits and meeting all other requirements  Future Appointments  No visits were found meeting these conditions  Showing future appointments within next 150 days and meeting all other requirements       After connecting through [employer], the patient was identified by name and date of birth  Mirtha Patel was informed that this is a telemedicine visit and that the visit is being conducted through Telephone  My office door was closed  No one else was in the room  She acknowledged consent and understanding of privacy and security of the video platform  Mirtha Patel verbally agrees to participate in [employer]   Pt is aware that Virtual Care Services could be limited without vital signs or the ability to perform a full hands-on physical exam  Mirtha Patel understands she or the provider may request at any time to terminate the video visit and request the patient to seek care or treatment in person  Patient is aware this is a billable service

## 2023-03-31 NOTE — ASSESSMENT & PLAN NOTE
Stable  Continue venlafaxine for migraine prevention and continue rizatriptan for breakthrough headaches

## 2023-03-31 NOTE — PATIENT INSTRUCTIONS
Medicare Preventive Visit Patient Instructions  Thank you for completing your Welcome to Medicare Visit or Medicare Annual Wellness Visit today  Your next wellness visit will be due in one year (3/31/2024)  The screening/preventive services that you may require over the next 5-10 years are detailed below  Some tests may not apply to you based off risk factors and/or age  Screening tests ordered at today's visit but not completed yet may show as past due  Also, please note that scanned in results may not display below  Preventive Screenings:  Service Recommendations Previous Testing/Comments   Colorectal Cancer Screening  * Colonoscopy    * Fecal Occult Blood Test (FOBT)/Fecal Immunochemical Test (FIT)  * Fecal DNA/Cologuard Test  * Flexible Sigmoidoscopy Age: 39-70 years old   Colonoscopy: every 10 years (may be performed more frequently if at higher risk)  OR  FOBT/FIT: every 1 year  OR  Cologuard: every 3 years  OR  Sigmoidoscopy: every 5 years  Screening may be recommended earlier than age 39 if at higher risk for colorectal cancer  Also, an individualized decision between you and your healthcare provider will decide whether screening between the ages of 74-80 would be appropriate  Colonoscopy: Not on file  FOBT/FIT: Not on file  Cologuard: Not on file  Sigmoidoscopy: Not on file    Screening Current     Breast Cancer Screening Age: 36 years old  Frequency: every 1-2 years  Not required if history of left and right mastectomy Mammogram: 02/03/2023    Screening Current   Cervical Cancer Screening Between the ages of 21-29, pap smear recommended once every 3 years  Between the ages of 33-67, can perform pap smear with HPV co-testing every 5 years     Recommendations may differ for women with a history of total hysterectomy, cervical cancer, or abnormal pap smears in past  Pap Smear: 06/12/2020    Screening Current   Hepatitis C Screening Once for adults born between 1945 and 1965  More frequently in patients at high risk for Hepatitis C Hep C Antibody: 10/27/2022    Screening Not Indicated  History Hepatitis C   Diabetes Screening 1-2 times per year if you're at risk for diabetes or have pre-diabetes Fasting glucose: 82 mg/dL (11/11/2020)  A1C: 5 1 % (10/8/2018)      Cholesterol Screening Once every 5 years if you don't have a lipid disorder  May order more often based on risk factors  Lipid panel: 06/30/2020    Screening Current     Other Preventive Screenings Covered by Medicare:  1  Abdominal Aortic Aneurysm (AAA) Screening: covered once if your at risk  You're considered to be at risk if you have a family history of AAA  2  Lung Cancer Screening: covers low dose CT scan once per year if you meet all of the following conditions: (1) Age 50-69; (2) No signs or symptoms of lung cancer; (3) Current smoker or have quit smoking within the last 15 years; (4) You have a tobacco smoking history of at least 20 pack years (packs per day multiplied by number of years you smoked); (5) You get a written order from a healthcare provider  3  Glaucoma Screening: covered annually if you're considered high risk: (1) You have diabetes OR (2) Family history of glaucoma OR (3)  aged 48 and older OR (3)  American aged 72 and older  3  Osteoporosis Screening: covered every 2 years if you meet one of the following conditions: (1) You're estrogen deficient and at risk for osteoporosis based off medical history and other findings; (2) Have a vertebral abnormality; (3) On glucocorticoid therapy for more than 3 months; (4) Have primary hyperparathyroidism; (5) On osteoporosis medications and need to assess response to drug therapy  · Last bone density test (DXA Scan): Not on file  5  HIV Screening: covered annually if you're between the age of 12-76  Also covered annually if you are younger than 13 and older than 72 with risk factors for HIV infection   For pregnant patients, it is covered up to 3 times per pregnancy  Immunizations:  Immunization Recommendations   Influenza Vaccine Annual influenza vaccination during flu season is recommended for all persons aged >= 6 months who do not have contraindications   Pneumococcal Vaccine   * Pneumococcal conjugate vaccine = PCV13 (Prevnar 13), PCV15 (Vaxneuvance), PCV20 (Prevnar 20)  * Pneumococcal polysaccharide vaccine = PPSV23 (Pneumovax) Adults 25-60 years old: 1-3 doses may be recommended based on certain risk factors  Adults 72 years old: 1-2 doses may be recommended based off what pneumonia vaccine you previously received   Hepatitis B Vaccine 3 dose series if at intermediate or high risk (ex: diabetes, end stage renal disease, liver disease)   Tetanus (Td) Vaccine - COST NOT COVERED BY MEDICARE PART B Following completion of primary series, a booster dose should be given every 10 years to maintain immunity against tetanus  Td may also be given as tetanus wound prophylaxis  Tdap Vaccine - COST NOT COVERED BY MEDICARE PART B Recommended at least once for all adults  For pregnant patients, recommended with each pregnancy  Shingles Vaccine (Shingrix) - COST NOT COVERED BY MEDICARE PART B  2 shot series recommended in those aged 48 and above     Health Maintenance Due:      Topic Date Due   • Colorectal Cancer Screening  03/01/2023   • Cervical Cancer Screening  06/12/2023   • HIV Screening  Completed   • Hepatitis C Screening  Discontinued     Immunizations Due:      Topic Date Due   • Pneumococcal Vaccine: Pediatrics (0 to 5 Years) and At-Risk Patients (6 to 59 Years) (1 - PCV) Never done   • COVID-19 Vaccine (3 - Booster for Moderna series) 06/07/2021   • Influenza Vaccine (1) 09/01/2022     Advance Directives   What are advance directives? Advance directives are legal documents that state your wishes and plans for medical care  These plans are made ahead of time in case you lose your ability to make decisions for yourself   Advance directives can apply to any medical decision, such as the treatments you want, and if you want to donate organs  What are the types of advance directives? There are many types of advance directives, and each state has rules about how to use them  You may choose a combination of any of the following:  · Living will: This is a written record of the treatment you want  You can also choose which treatments you do not want, which to limit, and which to stop at a certain time  This includes surgery, medicine, IV fluid, and tube feedings  · Durable power of  for healthcare [facility]): This is a written record that states who you want to make healthcare choices for you when you are unable to make them for yourself  This person, called a proxy, is usually a family member or a friend  You may choose more than 1 proxy  · Do not resuscitate (DNR) order:  A DNR order is used in case your heart stops beating or you stop breathing  It is a request not to have certain forms of treatment, such as CPR  A DNR order may be included in other types of advance directives  · Medical directive: This covers the care that you want if you are in a coma, near death, or unable to make decisions for yourself  You can list the treatments you want for each condition  Treatment may include pain medicine, surgery, blood transfusions, dialysis, IV or tube feedings, and a ventilator (breathing machine)  · Values history: This document has questions about your views, beliefs, and how you feel and think about life  This information can help others choose the care that you would choose  Why are advance directives important? An advance directive helps you control your care  Although spoken wishes may be used, it is better to have your wishes written down  Spoken wishes can be misunderstood, or not followed  Treatments may be given even if you do not want them  An advance directive may make it easier for your family to make difficult choices about your care  Cigarette Smoking and Your Health   Risks to your health if you smoke:  Nicotine and other chemicals found in tobacco damage every cell in your body  Even if you are a light smoker, you have an increased risk for cancer, heart disease, and lung disease  If you are pregnant or have diabetes, smoking increases your risk for complications  Benefits to your health if you stop smoking:   · You decrease respiratory symptoms such as coughing, wheezing, and shortness of breath  · You reduce your risk for cancers of the lung, mouth, throat, kidney, bladder, pancreas, stomach, and cervix  If you already have cancer, you increase the benefits of chemotherapy  You also reduce your risk for cancer returning or a second cancer from developing  · You reduce your risk for heart disease, blood clots, heart attack, and stroke  · You reduce your risk for lung infections, and diseases such as pneumonia, asthma, chronic bronchitis, and emphysema  · Your circulation improves  More oxygen can be delivered to your body  If you have diabetes, you lower your risk for complications, such as kidney, artery, and eye diseases  You also lower your risk for nerve damage  Nerve damage can lead to amputations, poor vision, and blindness  · You improve your body's ability to heal and to fight infections  For more information and support to stop smoking:   · [employer]  gov  Phone: 1- 794 - 034-8662  Web Address: www [employer]  Narcotic (Opioid) Safety    Use narcotics safely:  · Take prescribed narcotics exactly as directed  · Do not give narcotics to others or take narcotics that belong to someone else  · Do not mix narcotics without medicines or alcohol  · Do not drive or operate heavy machinery after you take the narcotic  · Monitor for side effects and notify your healthcare provider if you experienced side effects such as nausea, sleepiness, itching, or trouble thinking clearly      Manage constipation:    Constipation is the most common side effect of narcotic medicine  Constipation is when you have hard, dry bowel movements, or you go longer than usual between bowel movements  Tell your healthcare provider about all changes in your bowel movements while you are taking narcotics  He or she may recommend laxative medicine to help you have a bowel movement  He or she may also change the kind of narcotic you are taking, or change when you take it  The following are more ways you can prevent or relieve constipation:    · Drink liquids as directed  You may need to drink extra liquids to help soften and move your bowels  Ask how much liquid to drink each day and which liquids are best for you  · Eat high-fiber foods  This may help decrease constipation by adding bulk to your bowel movements  High-fiber foods include fruits, vegetables, whole-grain breads and cereals, and beans  Your healthcare provider or dietitian can help you create a high-fiber meal plan  Your provider may also recommend a fiber supplement if you cannot get enough fiber from food  · Exercise regularly  Regular physical activity can help stimulate your intestines  Walking is a good exercise to prevent or relieve constipation  Ask which exercises are best for you  · Schedule a time each day to have a bowel movement  This may help train your body to have regular bowel movements  Bend forward while you are on the toilet to help move the bowel movement out  Sit on the toilet for at least 10 minutes, even if you do not have a bowel movement  Store narcotics safely:   · Store narcotics where others cannot easily get them  Keep them in a locked cabinet or secure area  Do not  keep them in a purse or other bag you carry with you  A person may be looking for something else and find the narcotics  · Make sure narcotics are stored out of the reach of children  A child can easily overdose on narcotics  Narcotics may look like candy to a small child      The best way to dispose of narcotics: The laws vary by country and area  In the [place], the best way is to return the narcotics through a take-back program  This program is offered by the [employer])  The following are options for using the program:  · Take the narcotics to a LUCY collection site  The site is often a law enforcement center  Call your local law enforcement center for scheduled take-back days in your area  You will be given information on where to go if the collection site is in a different location  · Take the narcotics to an approved pharmacy or hospital   A pharmacy or hospital may be set up as a collection site  You will need to ask if it is a LUCY collection site if you were not directed there  A pharmacy or doctor's office may not be able to take back narcotics unless it is a LUCY site  · Use a mail-back system  This means you are given containers to put the narcotics into  You will then mail them in the containers  · Use a take-back drop box  This is a place to leave the narcotics at any time  People and animals will not be able to get into the box  Your local law enforcement agency can tell you where to find a drop box in your area  Other ways to manage pain:   · Ask your healthcare provider about non-narcotic medicines to control pain  Nonprescription medicines include NSAIDs (such as ibuprofen) and acetaminophen  Prescription medicines include muscle relaxers, antidepressants, and steroids  · Pain may be managed without any medicines  Some ways to relieve pain include massage, aromatherapy, or meditation  Physical or occupational therapy may also help  For more information:   · Drug Enforcement Administration  [facility]  [address]  Phone: 9- 822 - 525-6673  Web Address: [facility]/drug_disposal/    · [address] and Drug Administration  [place] Carlota Haile , [address]  Phone: 2- 090 - 641-9000  Web Address: http://[employer]/     © Copyright [address] 2018 Information is for End User's use only and may not be sold, redistributed or otherwise used for commercial purposes   All illustrations and images included in CareNotes® are the copyrighted property of A D A M , Inc  or [address] [place] [address]

## 2023-04-03 RX ORDER — BUPRENORPHINE AND NALOXONE 2; .5 MG/1; MG/1
6 FILM, SOLUBLE BUCCAL; SUBLINGUAL DAILY
Qty: 90 FILM | Refills: 0 | Status: SHIPPED | OUTPATIENT
Start: 2023-04-03

## 2023-05-09 ENCOUNTER — TELEMEDICINE (OUTPATIENT)
Dept: INTERNAL MEDICINE CLINIC | Facility: CLINIC | Age: 43
End: 2023-05-09

## 2023-05-09 DIAGNOSIS — Z79.899 ENCOUNTER FOR MONITORING SUBOXONE MAINTENANCE THERAPY: ICD-10-CM

## 2023-05-09 DIAGNOSIS — Z51.81 ENCOUNTER FOR MONITORING SUBOXONE MAINTENANCE THERAPY: ICD-10-CM

## 2023-05-09 DIAGNOSIS — Z12.11 SCREENING FOR MALIGNANT NEOPLASM OF COLON: Primary | ICD-10-CM

## 2023-05-09 RX ORDER — BUPRENORPHINE AND NALOXONE 2; .5 MG/1; MG/1
4 FILM, SOLUBLE BUCCAL; SUBLINGUAL DAILY
Qty: 60 FILM | Refills: 0 | Status: SHIPPED | OUTPATIENT
Start: 2023-05-09 | End: 2023-05-09 | Stop reason: SDUPTHER

## 2023-05-09 RX ORDER — BUPRENORPHINE AND NALOXONE 2; .5 MG/1; MG/1
4 FILM, SOLUBLE BUCCAL; SUBLINGUAL DAILY
Qty: 60 FILM | Refills: 0 | Status: SHIPPED | OUTPATIENT
Start: 2023-05-09

## 2023-05-09 NOTE — PROGRESS NOTES
Virtual Regular Visit    Verification of patient location:    Patient is located at Other in the following state in which I hold an active license [place]      Assessment/Plan:    Problem List Items Addressed This Visit        Other    Encounter for monitoring Suboxone maintenance therapy    Relevant Medications    buprenorphine-naloxone (Suboxone) 2-0 5 mg   Other Visit Diagnoses     Screening for malignant neoplasm of colon    -  Primary    Relevant Orders    Cologuard               Reason for visit is   Chief Complaint   Patient presents with   • Virtual Regular Visit     televisit send text to [phone] // Suboxone follow up        Encounter provider Zeina Davila MD    Provider located at [place] [address]  [address] [place] [id number]      Recent Visits  No visits were found meeting these conditions  Showing recent visits within past 7 days and meeting all other requirements  Today's Visits  Date Type Provider Dept   05/09/23 Telemedicine [facility], [address] today's visits and meeting all other requirements  Future Appointments  No visits were found meeting these conditions  Showing future appointments within next 150 days and meeting all other requirements       The patient was identified by name and date of birth  Mirtha Patel was informed that this is a telemedicine visit and that the visit is being conducted through the [address] [facility] Now platform  She agrees to proceed     My office door was closed  No one else was in the room  She acknowledged consent and understanding of privacy and security of the video platform  The patient has agreed to participate and understands they can discontinue the visit at any time  Patient is aware this is a billable service       Subjective  Mirtha Patel is a 37 y o  female scheduled for an in office visit but had to bring her 25year-old daughter to the dentist for an emergency root canal which required a conversion to a virtual visit  The patient is doing well  She is on Suboxone maintenance monitoring  She is currently at 4 mg daily coming down from 6 mg daily  She is doing well without any signs or symptoms of opioid withdrawal        Past Medical History:   Diagnosis Date   • Acute pharyngitis due to other specified organisms 2021   • Arthritis     shoulders   • Consultation for female sterilization 3/12/2021   • Crohn disease ([place] [address] )    • Depression    • Depression affecting pregnancy in third trimester, antepartum 10/26/2020   • Drug abuse ([facility] )    • Ganglion cyst of wrist, right     LAST ASSESSED: [id number]   • Gastritis    • Hepatitis C     dx 15 yrs ago-2015 retested told undetectable  FIBROSCAN 10/2016  F0-F1  Began treatment with Harvoni 3/2017   • Hepatitis C virus infection     LAST ASSESSED: [id number]   • History of heroin abuse ([place] [address] ) 2015    Description: Sober since 3/2020   • Hordeolum externum left eye, unspecified eyelid     LAST ASSESSED: [id number]   • Infectious viral hepatitis     hep c treated   • Pyelonephritis     pyelonephritis   • Shingles    • Stargardt's disease    • Stargardts Disease     dx when 23years old   • Tear of right scapholunate ligament     LAST ASSESSED: [id number]   • Varicella     as child   • Wears glasses     reading       Past Surgical History:   Procedure Laterality Date   •  SECTION      LAST ASSESSED: [id number]   • INDUCED       SURIGCAL TREATMENT FOR    • LIVER BIOPSY N/A    • CA ESOPHAGOGASTRODUODENOSCOPY TRANSORAL DIAGNOSTIC N/A 2016    Procedure: ESOPHAGOGASTRODUODENOSCOPY (EGD); Surgeon: Philip Oliva MD;  Location: [facility];   Service: Gastroenterology   • CA LAPAROSCOPY W/RMVL ADNEXAL STRUCTURES Bilateral 2021    Procedure: SALPINGECTOMY, LAPAROSCOPIC;  Surgeon: Mina Khalil MD;  Location: [facility];  Service: Gynecology   • MT NEUROPLASTY &/TRANSPOS MEDIAN NRV CARPAL Fauzia Risk Left 4/2/2021    Procedure: CARPAL TUNNEL RELEASE;  Surgeon: Amna Syed MD;  Location: [facility];  Service: Orthopedics   • WISDOM TOOTH EXTRACTION         Current Outpatient Medications   Medication Sig Dispense Refill   • buprenorphine-naloxone (Suboxone) 2-0 5 mg Place 2 Film (4 mg total) under the tongue daily Place 3 Film (6 mg total) under the tongue daily 60 Film 0   • mesalamine (PENTASA) 500 mg CR capsule Take 1 capsule (500 mg total) by mouth 3 (three) times a day 270 capsule 3   • olopatadine (PATANOL) 0 1 % ophthalmic solution Administer 1 drop to both eyes 2 (two) times a day 5 mL 0   • rizatriptan (MAXALT) 10 mg tablet May repeat in 2 hours if needed 9 tablet 5   • sertraline (ZOLOFT) 100 mg tablet Take 2 tablets (200 mg total) by mouth daily 60 tablet 5   • venlafaxine (EFFEXOR-XR) 75 mg 24 hr capsule Take 1 capsule (75 mg total) by mouth daily 30 capsule 5     No current facility-administered medications for this visit  No Known Allergies    Review of Systems   Constitutional: Negative  Negative for activity change, appetite change, chills, diaphoresis, fatigue, fever and unexpected weight change  HENT: Negative  Eyes: Negative  Respiratory: Negative  Cardiovascular: Negative  Gastrointestinal: Negative  Endocrine: Negative  Genitourinary: Negative  Musculoskeletal: Negative  Skin: Negative  Neurological: Negative  Hematological: Negative  Psychiatric/Behavioral: The patient is not nervous/anxious  Video Exam    Vitals:       Physical Exam  Constitutional:       General: She is not in acute distress  Appearance: Normal appearance  She is normal weight  She is not ill-appearing, toxic-appearing or diaphoretic  HENT:      Head: Normocephalic and atraumatic  Right Ear: External ear normal       Left Ear: External ear normal    Eyes:      General: No scleral icterus  Conjunctiva/sclera: Conjunctivae normal       Pupils: Pupils are equal, round, and reactive to light  Pulmonary:      Effort: Pulmonary effort is normal  No respiratory distress  Abdominal:      General: Abdomen is flat  There is no distension  Musculoskeletal:      Cervical back: Normal range of motion  Right lower leg: No edema  Left lower leg: No edema  Skin:     Coloration: Skin is not jaundiced  Findings: No bruising, erythema or rash  Neurological:      General: No focal deficit present  Mental Status: She is alert and oriented to person, place, and time  Mental status is at baseline     Psychiatric:         Mood and Affect: Mood normal          Behavior: Behavior normal           Visit Time  Total Visit Duration: 8 minutes

## 2023-06-05 ENCOUNTER — TELEPHONE (OUTPATIENT)
Dept: INTERNAL MEDICINE CLINIC | Facility: OTHER | Age: 43
End: 2023-06-05

## 2023-06-07 NOTE — TELEPHONE ENCOUNTER
LEFT MESSAGE ON MACHINE for patient to call office back and see if they would like to schedule and ED Follow Up

## 2023-06-23 ENCOUNTER — TELEPHONE (OUTPATIENT)
Dept: INTERNAL MEDICINE CLINIC | Facility: CLINIC | Age: 43
End: 2023-06-23

## 2023-06-23 DIAGNOSIS — Z79.899 ENCOUNTER FOR MONITORING SUBOXONE MAINTENANCE THERAPY: ICD-10-CM

## 2023-06-23 DIAGNOSIS — Z51.81 ENCOUNTER FOR MONITORING SUBOXONE MAINTENANCE THERAPY: ICD-10-CM

## 2023-06-23 RX ORDER — BUPRENORPHINE AND NALOXONE 2; .5 MG/1; MG/1
4 FILM, SOLUBLE BUCCAL; SUBLINGUAL DAILY
Qty: 60 FILM | Refills: 0 | Status: SHIPPED | OUTPATIENT
Start: 2023-06-23

## 2023-06-23 NOTE — TELEPHONE ENCOUNTER
Patient's last appointment was a televisit on 05/09/2023, a message was left on her voicemail to schedule next appointment in the office, appointment was not made  She has now called stating she only has 2 days worth of suboxone medication  Please advise as provider is out of the office until 6/27/2023  Patient is now scheduled for an appointment on 07/03/23

## 2023-08-18 ENCOUNTER — HOSPITAL ENCOUNTER (EMERGENCY)
Facility: HOSPITAL | Age: 43
Discharge: HOME | End: 2023-08-19
Attending: EMERGENCY MEDICINE
Payer: MEDICARE

## 2023-08-18 DIAGNOSIS — F15.10 METHAMPHETAMINE USE (HCC): ICD-10-CM

## 2023-08-18 DIAGNOSIS — R11.10 VOMITING: ICD-10-CM

## 2023-08-18 DIAGNOSIS — E83.42 HYPOMAGNESEMIA: ICD-10-CM

## 2023-08-18 DIAGNOSIS — E87.6 HYPOKALEMIA: ICD-10-CM

## 2023-08-18 DIAGNOSIS — R07.89 ATYPICAL CHEST PAIN: Primary | ICD-10-CM

## 2023-08-18 DIAGNOSIS — F19.90 DRUG USE: ICD-10-CM

## 2023-08-18 PROCEDURE — 83735 ASSAY OF MAGNESIUM: CPT | Performed by: EMERGENCY MEDICINE

## 2023-08-18 PROCEDURE — 93005 ELECTROCARDIOGRAM TRACING: CPT

## 2023-08-18 PROCEDURE — 84484 ASSAY OF TROPONIN QUANT: CPT | Performed by: EMERGENCY MEDICINE

## 2023-08-18 PROCEDURE — 99285 EMERGENCY DEPT VISIT HI MDM: CPT

## 2023-08-18 PROCEDURE — 96375 TX/PRO/DX INJ NEW DRUG ADDON: CPT

## 2023-08-18 PROCEDURE — 84703 CHORIONIC GONADOTROPIN ASSAY: CPT | Performed by: EMERGENCY MEDICINE

## 2023-08-18 PROCEDURE — 83880 ASSAY OF NATRIURETIC PEPTIDE: CPT | Performed by: EMERGENCY MEDICINE

## 2023-08-18 PROCEDURE — 99285 EMERGENCY DEPT VISIT HI MDM: CPT | Performed by: EMERGENCY MEDICINE

## 2023-08-18 PROCEDURE — 80048 BASIC METABOLIC PNL TOTAL CA: CPT | Performed by: EMERGENCY MEDICINE

## 2023-08-18 PROCEDURE — 96361 HYDRATE IV INFUSION ADD-ON: CPT

## 2023-08-18 PROCEDURE — 85025 COMPLETE CBC W/AUTO DIFF WBC: CPT | Performed by: EMERGENCY MEDICINE

## 2023-08-18 RX ORDER — FAMOTIDINE 20 MG/1
20 TABLET, FILM COATED ORAL ONCE
Status: COMPLETED | OUTPATIENT
Start: 2023-08-18 | End: 2023-08-18

## 2023-08-18 RX ORDER — SUCRALFATE 1 G/1
1 TABLET ORAL ONCE
Status: COMPLETED | OUTPATIENT
Start: 2023-08-18 | End: 2023-08-18

## 2023-08-18 RX ORDER — MAGNESIUM SULFATE HEPTAHYDRATE 40 MG/ML
2 INJECTION, SOLUTION INTRAVENOUS ONCE
Status: COMPLETED | OUTPATIENT
Start: 2023-08-19 | End: 2023-08-19

## 2023-08-18 RX ORDER — ONDANSETRON 2 MG/ML
1 INJECTION INTRAMUSCULAR; INTRAVENOUS ONCE
Status: COMPLETED | OUTPATIENT
Start: 2023-08-18 | End: 2023-08-18

## 2023-08-18 RX ORDER — LORAZEPAM 2 MG/ML
1 INJECTION INTRAMUSCULAR ONCE
Status: COMPLETED | OUTPATIENT
Start: 2023-08-18 | End: 2023-08-18

## 2023-08-18 RX ORDER — MIDAZOLAM HYDROCHLORIDE 1 MG/ML
1 INJECTION INTRAMUSCULAR; INTRAVENOUS ONCE
Status: COMPLETED | OUTPATIENT
Start: 2023-08-18 | End: 2023-08-18

## 2023-08-18 RX ADMIN — SUCRALFATE 1 G: 1 TABLET ORAL at 23:56

## 2023-08-18 RX ADMIN — SODIUM CHLORIDE 1000 ML: 0.9 INJECTION, SOLUTION INTRAVENOUS at 23:56

## 2023-08-18 RX ADMIN — FAMOTIDINE 20 MG: 20 TABLET ORAL at 23:56

## 2023-08-18 RX ADMIN — LORAZEPAM 1 MG: 2 INJECTION INTRAMUSCULAR; INTRAVENOUS at 23:56

## 2023-08-19 ENCOUNTER — APPOINTMENT (EMERGENCY)
Dept: RADIOLOGY | Facility: HOSPITAL | Age: 43
End: 2023-08-19
Payer: MEDICARE

## 2023-08-19 VITALS
TEMPERATURE: 98 F | RESPIRATION RATE: 20 BRPM | OXYGEN SATURATION: 99 % | HEART RATE: 100 BPM | BODY MASS INDEX: 23.73 KG/M2 | SYSTOLIC BLOOD PRESSURE: 128 MMHG | DIASTOLIC BLOOD PRESSURE: 79 MMHG | WEIGHT: 160.72 LBS

## 2023-08-19 LAB
ANION GAP SERPL CALCULATED.3IONS-SCNC: 8 MMOL/L
ATRIAL RATE: 83 BPM
ATRIAL RATE: 96 BPM
BASOPHILS # BLD AUTO: 0.04 THOUSANDS/ÂΜL (ref 0–0.1)
BASOPHILS NFR BLD AUTO: 0 % (ref 0–1)
BNP SERPL-MCNC: 44 PG/ML (ref 0–100)
BUN SERPL-MCNC: 7 MG/DL (ref 5–25)
CALCIUM SERPL-MCNC: 8.4 MG/DL (ref 8.4–10.2)
CARDIAC TROPONIN I PNL SERPL HS: 3 NG/L
CHLORIDE SERPL-SCNC: 104 MMOL/L (ref 96–108)
CO2 SERPL-SCNC: 23 MMOL/L (ref 21–32)
CREAT SERPL-MCNC: 0.52 MG/DL (ref 0.6–1.3)
EOSINOPHIL # BLD AUTO: 0.03 THOUSAND/ÂΜL (ref 0–0.61)
EOSINOPHIL NFR BLD AUTO: 0 % (ref 0–6)
ERYTHROCYTE [DISTWIDTH] IN BLOOD BY AUTOMATED COUNT: 13.2 % (ref 11.6–15.1)
GFR SERPL CREATININE-BSD FRML MDRD: 115 ML/MIN/1.73SQ M
GLUCOSE SERPL-MCNC: 114 MG/DL (ref 65–140)
HCG SERPL QL: NEGATIVE
HCT VFR BLD AUTO: 34.1 % (ref 34.8–46.1)
HGB BLD-MCNC: 11.5 G/DL (ref 11.5–15.4)
IMM GRANULOCYTES # BLD AUTO: 0.05 THOUSAND/UL (ref 0–0.2)
IMM GRANULOCYTES NFR BLD AUTO: 1 % (ref 0–2)
LYMPHOCYTES # BLD AUTO: 1.03 THOUSANDS/ÂΜL (ref 0.6–4.47)
LYMPHOCYTES NFR BLD AUTO: 10 % (ref 14–44)
MAGNESIUM SERPL-MCNC: 1.5 MG/DL (ref 1.9–2.7)
MCH RBC QN AUTO: 30.1 PG (ref 26.8–34.3)
MCHC RBC AUTO-ENTMCNC: 33.7 G/DL (ref 31.4–37.4)
MCV RBC AUTO: 89 FL (ref 82–98)
MONOCYTES # BLD AUTO: 0.75 THOUSAND/ÂΜL (ref 0.17–1.22)
MONOCYTES NFR BLD AUTO: 8 % (ref 4–12)
NEUTROPHILS # BLD AUTO: 7.97 THOUSANDS/ÂΜL (ref 1.85–7.62)
NEUTS SEG NFR BLD AUTO: 81 % (ref 43–75)
NRBC BLD AUTO-RTO: 0 /100 WBCS
P AXIS: 59 DEGREES
P AXIS: 71 DEGREES
PLATELET # BLD AUTO: 249 THOUSANDS/UL (ref 149–390)
PMV BLD AUTO: 10 FL (ref 8.9–12.7)
POTASSIUM SERPL-SCNC: 3.3 MMOL/L (ref 3.5–5.3)
PR INTERVAL: 158 MS
PR INTERVAL: 160 MS
QRS AXIS: 50 DEGREES
QRS AXIS: 51 DEGREES
QRSD INTERVAL: 84 MS
QRSD INTERVAL: 88 MS
QT INTERVAL: 386 MS
QT INTERVAL: 430 MS
QTC INTERVAL: 487 MS
QTC INTERVAL: 505 MS
RBC # BLD AUTO: 3.82 MILLION/UL (ref 3.81–5.12)
SODIUM SERPL-SCNC: 135 MMOL/L (ref 135–147)
T WAVE AXIS: 64 DEGREES
T WAVE AXIS: 67 DEGREES
VENTRICULAR RATE: 83 BPM
VENTRICULAR RATE: 96 BPM
WBC # BLD AUTO: 9.87 THOUSAND/UL (ref 4.31–10.16)

## 2023-08-19 PROCEDURE — 93005 ELECTROCARDIOGRAM TRACING: CPT

## 2023-08-19 PROCEDURE — 93010 ELECTROCARDIOGRAM REPORT: CPT | Performed by: INTERNAL MEDICINE

## 2023-08-19 PROCEDURE — 71045 X-RAY EXAM CHEST 1 VIEW: CPT

## 2023-08-19 PROCEDURE — 96365 THER/PROPH/DIAG IV INF INIT: CPT

## 2023-08-19 RX ORDER — POTASSIUM CHLORIDE 20 MEQ/1
20 TABLET, EXTENDED RELEASE ORAL ONCE
Status: COMPLETED | OUTPATIENT
Start: 2023-08-19 | End: 2023-08-19

## 2023-08-19 RX ORDER — MAGNESIUM L-LACTATE 84 MG
84 TABLET, EXTENDED RELEASE ORAL DAILY
Qty: 14 TABLET | Refills: 0 | Status: SHIPPED | OUTPATIENT
Start: 2023-08-19 | End: 2023-09-02

## 2023-08-19 RX ADMIN — MAGNESIUM SULFATE 2 G: 2 INJECTION INTRAVENOUS at 00:00

## 2023-08-19 RX ADMIN — POTASSIUM CHLORIDE 20 MEQ: 1500 TABLET, EXTENDED RELEASE ORAL at 01:11

## 2023-08-19 NOTE — ED NOTES
Patient provided with outpatient resources for D&A and mental health. She denies any suicidal/homicidal ideations, hallucinations, delusions, or paranoia.  She recently relapsed on meth and would like outpatient resources and possibly a therapist.
no

## 2023-08-19 NOTE — ED PROVIDER NOTES
History  Chief Complaint   Patient presents with   • Chest Pain     Pt with chest pain and vomiting. Pt reports using meth for last 2 days. Pt reports recent relapse     Patient is a 25-year-old female coming in via EMS for chest pain, vomiting started tonight. Patient states that last night and today she did 1 line of methamphetamines which she snorted. She states that her last use was approximately 1 to 2 weeks ago. She was clean for approximately 4 years she said but she keeps relapsing. Patient states that she even went to an NA meeting tonight. Tonight she was driving home when she became very nauseated and started vomiting. Was having some chest discomfort with it. She denies any fevers, chills, palpitations, syncope. She denies any shortness of breath,, lower extremity edema, recent travel or sick contacts. Patient is very tearful and states that she regrets doing this. She has no SI or HI. We did receive medical command call patient did receive Versed 2 mg. Crisis did walk in the room and patient is agreeable for outpatient resources for rehab as well as psychiatry. We will place an order for addiction services as well. Chart review shows patient had similar symptoms in June of this year where [facility] was [facility] where she had smoked joints that were laced with amphetamines.   It is reported that she is on Suboxone      History provided by:  Patient, medical records and EMS personnel   used: No    Chest Pain  Pain location:  Substernal area  Pain quality: aching and dull    Pain radiates to:  Does not radiate  Pain radiates to the back: no    Pain severity:  Mild  Onset quality:  Gradual  Timing:  Constant  Progression:  Resolved  Chronicity:  New  Context: at rest    Relieved by:  None tried  Worsened by:  Nothing tried  Ineffective treatments:  None tried  Associated symptoms: anxiety, nausea and vomiting    Associated symptoms: no abdominal pain, no AICD problem, no altered mental status, no anorexia, no back pain, no claudication, no cough, no diaphoresis, no dizziness, no dysphagia, no fatigue, no fever, no headache, no heartburn, no lower extremity edema, no near-syncope, no numbness, no orthopnea, no palpitations, no PND, no shortness of breath, no syncope and no weakness    Risk factors: smoking    Risk factors: no aortic disease, no birth control, no coronary artery disease, no diabetes mellitus, no Tim-Danlos syndrome, no high cholesterol, no hypertension, no immobilization, not male, no Marfan's syndrome, not pregnant, no prior DVT/PE and no surgery          Prior to Admission Medications   Prescriptions Last Dose Informant Patient Reported? Taking? buprenorphine-naloxone (Suboxone) 2-0.5 mg   No No   Sig: Place 2 Film (4 mg total) under the tongue daily   mesalamine (PENTASA) 500 mg CR capsule  Self No No   Sig: Take 1 capsule (500 mg total) by mouth 3 (three) times a day   olopatadine (PATANOL) 0.1 % ophthalmic solution  Self No No   Sig: Administer 1 drop to both eyes 2 (two) times a day   rizatriptan (MAXALT) 10 mg tablet  Self No No   Sig: May repeat in 2 hours if needed   sertraline (ZOLOFT) 100 mg tablet  Self No No   Sig: Take 2 tablets (200 mg total) by mouth daily   venlafaxine (EFFEXOR-XR) 75 mg 24 hr capsule  Self No No   Sig: Take 1 capsule (75 mg total) by mouth daily      Facility-Administered Medications: None       Past Medical History:   Diagnosis Date   • Acute pharyngitis due to other specified organisms 7/1/2021   • Arthritis     shoulders   • Consultation for female sterilization 3/12/2021   • Crohn disease ([address])    • Depression    • Depression affecting pregnancy in third trimester, antepartum 10/26/2020   • Drug abuse ([address])    • Ganglion cyst of wrist, right     LAST ASSESSED: [id number]   • Gastritis    • Hepatitis C     dx 15 yrs ago-2015 retested told undetectable. FIBROSCAN 10/2016  F0-F1.   Began treatment with Ananth Ganja 3/2017   • Hepatitis C virus infection     LAST ASSESSED: [id number]   • History of heroin abuse ([address] [facility]) 2015    Description: Sober since 3/2020   • Hordeolum externum left eye, unspecified eyelid     LAST ASSESSED: [id number]   • Infectious viral hepatitis     hep c treated   • Pyelonephritis     pyelonephritis   • Shingles    • Stargardt's disease    • Stargardts Disease     dx when 23years old   • Tear of right scapholunate ligament     LAST ASSESSED: [id number]   • Varicella     as child   • Wears glasses     reading       Past Surgical History:   Procedure Laterality Date   •  SECTION      LAST ASSESSED: [id number]   • INDUCED       SURIGCAL TREATMENT FOR    • LIVER BIOPSY N/A    • SD ESOPHAGOGASTRODUODENOSCOPY TRANSORAL DIAGNOSTIC N/A 2016    Procedure: ESOPHAGOGASTRODUODENOSCOPY (EGD); Surgeon: Cherise Vincent MD;  Location: [facility]; Service: Gastroenterology   • SD LAPAROSCOPY W/RMVL ADNEXAL STRUCTURES Bilateral 2021    Procedure: SALPINGECTOMY, LAPAROSCOPIC;  Surgeon: Roopa Loaiza MD;  Location: [facility];  Service: Gynecology   • SD NEUROPLASTY &/TRANSPOS MEDIAN NRV CARPAL Cornelius Footman Left 2021    Procedure: CARPAL TUNNEL RELEASE;  Surgeon: Krista Gunter MD;  Location: [facility];  Service: Orthopedics   • WISDOM TOOTH EXTRACTION         Family History   Problem Relation Age of Onset   • Heart attack Mother    • Cancer Mother         LEIOMYOSARCOMA   • Seizures Father         EPILEPSY   • Stroke Father    • No Known Problems Daughter    • Heart defect Daughter         ASD?    • Diabetes Brother    • Alcohol abuse Brother    • Mental illness Brother    • Hypertension Brother    • No Known Problems Brother    • Kidney disease Brother    • Bipolar disorder Brother    • No Known Problems Son    • No Known Problems Son    • No Known Problems Maternal Aunt    • No Known Problems Maternal Aunt    • No Known Problems Paternal Aunt      I have reviewed and agree with the history as documented. E-Cigarette/Vaping   • E-Cigarette Use Current Every Day User    • Start Date 1/1/22      E-Cigarette/Vaping Substances   • Nicotine Yes    • THC No    • CBD No    • Flavoring Yes    • Other No    • Unknown No      Social History     Tobacco Use   • Smoking status: Every Day     Packs/day: 1.00     Years: 20.00     Total pack years: 20.00     Types: Cigarettes     Start date: 1996   • Smokeless tobacco: Never   • Tobacco comments:     encouraged smoking cessation   Vaping Use   • Vaping Use: Every day   • Start date: 1/1/2022   • Substances: Nicotine, Flavoring   Substance Use Topics   • Alcohol use: No     Comment: former ETOH abuse-per pt   • Drug use: No     Comment: former addict-per doctor PMH, H/O INTRAVENOUS DRUG USE IN REMISSION       Review of Systems   Constitutional: Negative for chills, diaphoresis, fatigue and fever. HENT: Negative for ear pain, sore throat and trouble swallowing. Eyes: Negative for pain and visual disturbance. Respiratory: Negative for cough and shortness of breath. Cardiovascular: Positive for chest pain. Negative for palpitations, orthopnea, claudication, syncope, PND and near-syncope. Gastrointestinal: Positive for nausea and vomiting. Negative for abdominal pain, anorexia and heartburn. Genitourinary: Negative for dysuria and hematuria. Musculoskeletal: Negative for arthralgias and back pain. Skin: Negative for color change and rash. Neurological: Negative for dizziness, seizures, syncope, weakness, numbness and headaches. All other systems reviewed and are negative. Physical Exam  Physical Exam  Vitals and nursing note reviewed. Constitutional:       General: She is not in acute distress. Appearance: She is well-developed. HENT:      Head: Normocephalic and atraumatic. Comments: Patient maintaining airway and secretions. No stridor . No brawniness under tongue.      Eyes:      Extraocular Movements: Extraocular movements intact. Conjunctiva/sclera: Conjunctivae normal.      Pupils: Pupils are equal, round, and reactive to light. Cardiovascular:      Rate and Rhythm: Normal rate and regular rhythm. Pulses:           Radial pulses are 2+ on the right side and 2+ on the left side. Dorsalis pedis pulses are 2+ on the right side and 2+ on the left side. Heart sounds: Normal heart sounds, S1 normal and S2 normal. No murmur heard. Pulmonary:      Effort: Pulmonary effort is normal. No respiratory distress. Breath sounds: Normal breath sounds. Comments: No conversational dyspnea  Abdominal:      Palpations: Abdomen is soft. Tenderness: There is no abdominal tenderness. Musculoskeletal:         General: No swelling. Cervical back: Neck supple. Right lower leg: No edema. Left lower leg: No edema. Skin:     General: Skin is warm and dry. Capillary Refill: Capillary refill takes less than 2 seconds. Neurological:      General: No focal deficit present. Mental Status: She is alert and oriented to person, place, and time. GCS: GCS eye subscore is 4. GCS verbal subscore is 5. GCS motor subscore is 6. Cranial Nerves: Cranial nerves 2-12 are intact. Sensory: Sensation is intact. Motor: Motor function is intact. Comments: No slurred speech. No facial asymmetry. No tongue deviation. Patient with bilateral upper extremities and lower extremities spontaneously which other independently and pain-free. Psychiatric:         Attention and Perception: Attention and perception normal.         Mood and Affect: Affect normal. Mood is anxious. Speech: Speech is tangential.         Behavior: Behavior is hyperactive. Behavior is cooperative. Thought Content: Thought content normal.         Cognition and Memory: Cognition and memory normal.         Judgment: Judgment normal.      Comments: Patient is tangential but very redirectable.   She is not confused         Vital Signs  ED Triage Vitals [08/18/23 2334]   Temperature Pulse Respirations Blood Pressure SpO2   98 °F (36.7 °C) 80 16 125/76 99 %      Temp Source Heart Rate Source Patient Position - Orthostatic VS BP Location FiO2 (%)   Oral -- -- -- --      Pain Score       --           Vitals:    08/18/23 2334   BP: 125/76   Pulse: 80         Visual Acuity      ED Medications  Medications   midazolam (FOR EMS ONLY) (VERSED) 2 mg/2 mL injection 2 mg (0 mg Does not apply Given to EMS 8/18/23 2339)   ondansetron (FOR EMS ONLY) (ZOFRAN) 4 mg/2 mL injection 4 mg (0 mg Does not apply Given to EMS 8/18/23 2339)   sodium chloride 0.9 % bolus 1,000 mL (1,000 mL Intravenous New Bag 8/18/23 2356)   sucralfate (CARAFATE) tablet 1 g (1 g Oral Given 8/18/23 2356)   famotidine (PEPCID) tablet 20 mg (20 mg Oral Given 8/18/23 2356)   LORazepam (ATIVAN) injection 1 mg (1 mg Intravenous Given 8/18/23 2356)   magnesium sulfate 2 g/50 mL IVPB (premix) 2 g (0 g Intravenous Stopped 8/19/23 0041)   potassium chloride (K-DUR,KLOR-CON) CR tablet 20 mEq (20 mEq Oral Given 8/19/23 0111)       Diagnostic Studies  Results Reviewed     Procedure Component Value Units Date/Time    Basic metabolic panel [[id number]]  (Abnormal) Collected: 08/18/23 2355    Lab Status: Final result Specimen: Blood from Line, Venous Updated: 08/19/23 0057     Sodium 135 mmol/L      Potassium 3.3 mmol/L      Chloride 104 mmol/L      CO2 23 mmol/L      ANION GAP 8 mmol/L      BUN 7 mg/dL      Creatinine 0.52 mg/dL      Glucose 114 mg/dL      Calcium 8.4 mg/dL      eGFR 115 ml/min/1.73sq m     Narrative:      [place] guidelines for Chronic Kidney Disease (CKD):   •  Stage 1 with normal or high GFR (GFR > 90 mL/min/1.73 square meters)  •  Stage 2 Mild CKD (GFR = 60-89 mL/min/1.73 square meters)  •  Stage 3A Moderate CKD (GFR = 45-59 mL/min/1.73 square meters)  •  Stage 3B Moderate CKD (GFR = 30-44 mL/min/1.73 square meters)  • Stage 4 Severe CKD (GFR = 15-29 mL/min/1.73 square meters)  •  Stage 5 End Stage CKD (GFR <15 mL/min/1.73 square meters)  Note: GFR calculation is accurate only with a steady state creatinine    Magnesium [[id number]]  (Abnormal) Collected: 08/18/23 2355    Lab Status: Final result Specimen: Blood from Line, Venous Updated: 08/19/23 0057     Magnesium 1.5 mg/dL     B-Type Natriuretic Peptide(BNP) [[id number]]  (Normal) Collected: 08/18/23 2355    Lab Status: Final result Specimen: Blood from Line, Venous Updated: 08/19/23 0035     BNP 44 pg/mL     HS Troponin 0hr (reflex protocol) [[id number]]  (Normal) Collected: 08/18/23 2355    Lab Status: Final result Specimen: Blood from Line, Venous Updated: 08/19/23 0034     hs TnI 0hr 3 ng/L     hCG, qualitative pregnancy [[id number]]  (Normal) Collected: 08/18/23 2355    Lab Status: Final result Specimen: Blood from Line, Venous Updated: 08/19/23 0034     Preg, Serum Negative    CBC and differential [[id number]]  (Abnormal) Collected: 08/18/23 2355    Lab Status: Final result Specimen: Blood from Line, Venous Updated: 08/19/23 0011     WBC 9.87 Thousand/uL      RBC 3.82 Million/uL      Hemoglobin 11.5 g/dL      Hematocrit 34.1 %      MCV 89 fL      MCH 30.1 pg      MCHC 33.7 g/dL      RDW 13.2 %      MPV 10.0 fL      Platelets 818 Thousands/uL      nRBC 0 /100 WBCs      Neutrophils Relative 81 %      Immat GRANS % 1 %      Lymphocytes Relative 10 %      Monocytes Relative 8 %      Eosinophils Relative 0 %      Basophils Relative 0 %      Neutrophils Absolute 7.97 Thousands/µL      Immature Grans Absolute 0.05 Thousand/uL      Lymphocytes Absolute 1.03 Thousands/µL      Monocytes Absolute 0.75 Thousand/µL      Eosinophils Absolute 0.03 Thousand/µL      Basophils Absolute 0.04 Thousands/µL                  XR chest 1 view portable   ED Interpretation by Jerod Yoo DO (08/19 0038)   No acute pathology                 Procedures  Procedures         ED Course  ED Course as of 08/19/23 0126   Fri Aug 18, 2023   2346 Patient is a 57-year-old female coming in today after methamphetamines with chest pain and vomiting. On exam she is tangential with thoughts but no SI or HI. She is redirectable. Had chest pain and headache after 2 doses of methamphetamine use over the past 2 days. On exam she is otherwise well-appearing. We will start cardiac work-up. EKG without ischemic changes but noted prolonged QTc and will give magnesium    Disclosure: Voice to text software was used in the preparation of this document and could have resulted in translational errors.      Occasional wrong word or "sound a like" substitutions may have occurred due to the inherent limitations of voice recognition software.  Read the chart carefully and recognize, using context, where substitutions have occurred.       I have independently reviewed external records are available to me to the level of detail possible within the time constraints of my patient care responsibilities in the ED.       Sat Aug 19, 2023   0038 Labs reviewed and without actionable derangement    Pending bmp and mag   0100 Magnesium(!)  Was repleted IV. Will give K orally    Will repeat EKG given QTC was mildly prolonged. 0115 Repeat EKG improved with QTc shortened. Will discuss with patient and place her on outpatient oral magnesium   0124 Long discussion with patient and family at bedside. Updated on 2 EKGs as well as potassium magnesium. Will give magnesium for home. Patient feels markedly improved. He has no SI or HI. Discussed with her regarding symptoms of use of methamphetamines as well as withdrawal.  Return to ER instructions given.   Patient is calm and cooperative in no acute distress             HEART Risk Score    Flowsheet Row Most Recent Value   Heart Score Risk Calculator    History 0 Filed at: 08/19/2023 0037   ECG 1 Filed at: 08/19/2023 0037   Age 0 Filed at: 08/19/2023 0037   Risk Factors 1 Filed at: 08/19/2023 9872 Troponin 0 Filed at: 08/19/2023 0037   HEART Score 2 Filed at: 08/19/2023 0037                PERC Rule for PE    Flowsheet Row Most Recent Value   PERC Rule for PE    Age >=50 0 Filed at: 08/18/2023 2354   HR >=100 0 Filed at: 08/18/2023 2354   O2 Sat on room air < 95% 0 Filed at: 08/18/2023 2354   History of PE or DVT 0 Filed at: 08/18/2023 2354   Recent trauma or surgery 0 Filed at: 08/18/2023 2354   Hemoptysis 0 Filed at: 08/18/2023 2354   Exogenous estrogen 0 Filed at: 08/18/2023 2354   Unilateral leg swelling 0 Filed at: 08/18/2023 2354   PERC Rule for PE Results 0 Filed at: 08/18/2023 2354              SBIRT 22yo+    Flowsheet Row Most Recent Value   Initial Alcohol Screen: US AUDIT-C     1. How often do you have a drink containing alcohol? 0 Filed at: 08/18/2023 2345   2. How many drinks containing alcohol do you have on a typical day you are drinking? 0 Filed at: 08/18/2023 2345   3b. FEMALE Any Age, or MALE 65+: How often do you have 4 or more drinks on one occassion? 0 Filed at: 08/18/2023 2345   Audit-C Score 0 Filed at: 08/18/2023 2345   LANDY: How many times in the past year have you. .. Used an illegal drug or used a prescription medication for non-medical reasons? Once or Twice Filed at: 08/18/2023 2345   DAST-10: In the past 12 months. ..    1. Have you used drugs other than those required for medical reasons? 1 Filed at: 08/18/2023 2345   2. Do you use more than one drug at a time? 0 Filed at: 08/18/2023 2345   3. Have you had medical problems as a result of your drug use (e.g., memory loss, hepatitis, convulsions, bleeding, etc.)? 0 Filed at: 08/18/2023 2345   4. Have you had "blackouts" or "flashbacks" as a result of drug use? YesNo 0 Filed at: 08/18/2023 2345   5. Do you ever feel bad or guilty about your drug use? 1 Filed at: 08/18/2023 2345   6. Does your spouse (or parent) ever complain about your involvement with drugs? 0 Filed at: 08/18/2023 2345   7.  Have you neglected your family because of your use of drugs? 0 Filed at: 08/18/2023 2345   8. Have you engaged in illegal activities in order to obtain drugs? 0 Filed at: 08/18/2023 2345   9. Have you ever experienced withdrawal symptoms (felt sick) when you stopped taking drugs? 0 Filed at: 08/18/2023 2345   10. Are you always able to stop using drugs when you want to? 0 Filed at: 08/18/2023 2345   DAST-10 Score 2 Filed at: 08/18/2023 2345          Adela Gomes Criteria for PE    Flowsheet Row Most Recent Value   Sincere' Criteria for PE    Clinical signs and symptoms of DVT 0 Filed at: 08/18/2023 2354   PE is primary diagnosis or equally likely 0 Filed at: 08/18/2023 2354   HR >100 0 Filed at: 08/18/2023 2354   Immobilization at least 3 days or Surgery in the previous 4 weeks 0 Filed at: 08/18/2023 2354   Previous, objectively diagnosed PE or DVT 0 Filed at: 08/18/2023 2354   Hemoptysis 0 Filed at: 08/18/2023 2354   Malignancy with treatment within 6 months or palliative 0 Filed at: 08/18/2023 2354   Sincere' Criteria Total 0 Filed at: 08/18/2023 2354                Medical Decision Making      EKG INTERPRETATION @ 2341  RHYTHM: Normal sinus rhythm at 83 bpm  AXIS: Normal axis   INTERVALS: HI interval measured 158 ms  QRS COMPLEX: QTC meausred at 88 ms  ST SEGMENT: Nonspecific ST segment changes. Diffuse artifact  QT INTERVAL: QTc measured at 505 ms  COMPARED WITH PRIOR compared to old EKG on October 29, 2022 QTc is prolonged otherwise no acute change  Interpretation by Zaida Steward, DO    Different diagnosis : ACS, NSTEMI, pleurisry, pneumothorax, costochondritis, pneumonia, GERD, anxiety, hepatitis, pancreatitis, aortic dissection, pericarditis, myocarditis, pericardial effusion, asthma exacerbation, COPD exacerbation, herpes zoster, peritoneal rupture, esophageal spasm.       EKG INTERPRETATION @ 0112  RHYTHM: Sinus rhythm at 90 bpm  AXIS: Normal axis  INTERVALS: HI interval measured at 160 ms  QRS COMPLEX: QRS measured at 84 ms  ST SEGMENT: Nonspecific ST segment changes. Diffuse artifact  QT INTERVAL: QTc measured at 487 ms  COMPARED WITH PRIOR compared to earlier today QTc shortened and improved  Interpretation by Rangel Rosales DO        Atypical chest pain: acute illness or injury  Drug use: chronic illness or injury  Amount and/or Complexity of Data Reviewed  External Data Reviewed: notes. Labs: ordered. Decision-making details documented in ED Course. Details: No anemia or Leukocytosis. Troponin 3 and BNP 44 with heart score less than 3  Pregnancy negative    Radiology: ordered and independent interpretation performed. Decision-making details documented in ED Course. Details: CXR read by myself as no acute pathology  ECG/medicine tests: ordered and independent interpretation performed. Decision-making details documented in ED Course. Details: No ischemia or arrhythmia. Prolonged QTc      Risk  Prescription drug management. Disposition  Final diagnoses:   Atypical chest pain   Drug use   Hypokalemia   Hypomagnesemia   Vomiting   Methamphetamine use ([address])     Time reflects when diagnosis was documented in both MDM as applicable and the Disposition within this note     Time User Action Codes Description Comment    8/19/2023 12:00 AM Bendock, Esme L Add [R07.89] Atypical chest pain     8/19/2023 12:01 AM Bendock, Alver Sidles Add [F19.90] Drug use     8/19/2023  1:01 AM Bendock, Esme L Add [E87.6] Hypokalemia     8/19/2023  1:01 AM Bendock, Esme L Add [E83.42] Hypomagnesemia     8/19/2023  1:01 AM Bendock, Esme L Add [R11.10] Vomiting     8/19/2023  1:25 AM Bendock, Alver Sidles Add [F15.10] Methamphetamine use [facility])       ED Disposition     ED Disposition   Discharge    Condition   Stable    Date/Time   Sat Aug 19, 2023  1:25 AM    Comment   Mirtha Patel discharge to home/self care.                Follow-up Information     Follow up With Specialties Details Why Franky Seo MD Internal Medicine Schedule an appointment as soon as possible for a visit in 1 week  [address] [facility]  [phone]            Patient's Medications   Discharge Prescriptions    MAGNESIUM (MAGTAB) 84 MG (7MEQ) TBCR    Take 1 tablet (84 mg total) by mouth daily for 14 days       Start Date: 8/19/2023 End Date: 9/2/2023       Order Dose: 84 mg       Quantity: 14 tablet    Refills: 0       No discharge procedures on file.     PDMP Review       Value Time User    PDMP Reviewed  Yes 6/23/2023 11:25 AM Mary Kate Neely MD          ED Provider  Electronically Signed by           Jerod Yoo DO  08/19/23 0126

## 2023-08-21 ENCOUNTER — VBI (OUTPATIENT)
Dept: ADMINISTRATIVE | Facility: OTHER | Age: 43
End: 2023-08-21

## 2023-08-21 ENCOUNTER — TELEPHONE (OUTPATIENT)
Dept: INTERNAL MEDICINE CLINIC | Facility: OTHER | Age: 43
End: 2023-08-21

## 2023-08-21 NOTE — LETTER
VALUE BASED VIR  [facility] [address]    Date: 08/23/23  Akanksha Patel  [address] [place] [id number]    Dear Eric Spencer:                                                                                                                              Thank you for choosing [facility] emergency department for care. Your primary care provider wants to make sure that your ongoing medical care is being addressed. If you require follow up care as a result of your emergency department visit, there are a few things the practice would like you to know. As part of the network's continuing commitment to caring for our patients, we have added more same day appointments and have extended office hours to meet your medical needs. After hours, on-call physicians are available via your primary care provider's main office line. We encourage you to contact our office prior to seeking treatment to discuss your symptoms with the medical staff. Together, we can determine the correct course of action. A majority of non-emergent conditions such as: common cold, flu-like symptoms, fevers, strains/sprains, dislocations, minor burns, cuts and animal bites can be treated at [facility] facilities. Diagnostic testing is available at some sites. Of course, if you are experiencing a life threatening medical emergency call 911 or proceed directly to the nearest emergency room. Your nearest [facility] facility is conveniently located at:    [facility]  [address] [facility] [address] [facility], [address] [facility]  [phone] [address] offered at [address] your spot online at [email or web address] or on the [address] [place] [address] [address]    Sincerely,    VALUE BASED VIR  No information on file.

## 2023-08-21 NOTE — TELEPHONE ENCOUNTER
Received call from patient stating she is running out of suboxone medication, she would like to schedule an appointment for refills for 08/22/2023. Please advise where patient can be squeezed into 's schedule.

## 2023-08-21 NOTE — TELEPHONE ENCOUNTER
Mirtha Patel    ED Visit Information     Ed visit date: 8/18/2023  Diagnosis Description: Atypical Chest pain  [facility]  Discharge status: Home  Discharged with meds ?  Yes  Number of ED visits to date: 2  ED Severity:3     Outreach Information    Outreach successful: No 3  Date letter mailed:8/23/2023  Date Finalized:8/23/2023      Value Base Outreach    Outreach type: 3 Day Outreach  08/23/2023 04:27 PM EDT by Mary Brito 08/23/2023 04:27 PM EDT by Mary Brito  Outgoing Mirtha Patel (Self) [phone] (HVVVFY)[phone] (Mobile)  [phone] (Mobile) Remove  - Left Message

## 2023-08-22 ENCOUNTER — OFFICE VISIT (OUTPATIENT)
Dept: INTERNAL MEDICINE CLINIC | Age: 43
End: 2023-08-22
Payer: MEDICARE

## 2023-08-22 VITALS
OXYGEN SATURATION: 98 % | SYSTOLIC BLOOD PRESSURE: 138 MMHG | BODY MASS INDEX: 23.88 KG/M2 | TEMPERATURE: 97.8 F | HEART RATE: 87 BPM | WEIGHT: 161.2 LBS | HEIGHT: 69 IN | DIASTOLIC BLOOD PRESSURE: 80 MMHG

## 2023-08-22 DIAGNOSIS — Z51.81 ENCOUNTER FOR MONITORING SUBOXONE MAINTENANCE THERAPY: ICD-10-CM

## 2023-08-22 DIAGNOSIS — F32.A DEPRESSION, UNSPECIFIED DEPRESSION TYPE: ICD-10-CM

## 2023-08-22 DIAGNOSIS — Z79.899 ENCOUNTER FOR MONITORING SUBOXONE MAINTENANCE THERAPY: ICD-10-CM

## 2023-08-22 DIAGNOSIS — F31.78 BIPOLAR DISORDER, IN FULL REMISSION, MOST RECENT EPISODE MIXED (HCC): ICD-10-CM

## 2023-08-22 DIAGNOSIS — G43.109 MIGRAINE WITH AURA AND WITHOUT STATUS MIGRAINOSUS, NOT INTRACTABLE: ICD-10-CM

## 2023-08-22 PROCEDURE — 99213 OFFICE O/P EST LOW 20 MIN: CPT | Performed by: INTERNAL MEDICINE

## 2023-08-22 RX ORDER — RIZATRIPTAN BENZOATE 10 MG/1
TABLET ORAL
Qty: 27 TABLET | Refills: 1 | Status: SHIPPED | OUTPATIENT
Start: 2023-08-22

## 2023-08-22 RX ORDER — VENLAFAXINE HYDROCHLORIDE 75 MG/1
75 CAPSULE, EXTENDED RELEASE ORAL DAILY
Qty: 90 CAPSULE | Refills: 1 | Status: SHIPPED | OUTPATIENT
Start: 2023-08-22 | End: 2024-02-18

## 2023-08-22 RX ORDER — SERTRALINE HYDROCHLORIDE 100 MG/1
200 TABLET, FILM COATED ORAL DAILY
Qty: 180 TABLET | Refills: 1 | Status: SHIPPED | OUTPATIENT
Start: 2023-08-22 | End: 2024-02-18

## 2023-08-22 RX ORDER — BUPRENORPHINE AND NALOXONE 2; .5 MG/1; MG/1
4 FILM, SOLUBLE BUCCAL; SUBLINGUAL DAILY
Qty: 60 FILM | Refills: 0 | Status: SHIPPED | OUTPATIENT
Start: 2023-08-22
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
## 2023-09-22 ENCOUNTER — OFFICE VISIT (OUTPATIENT)
Age: 43
End: 2023-09-22
Payer: MEDICARE

## 2023-09-22 VITALS
SYSTOLIC BLOOD PRESSURE: 114 MMHG | DIASTOLIC BLOOD PRESSURE: 80 MMHG | BODY MASS INDEX: 23.46 KG/M2 | TEMPERATURE: 98 F | HEIGHT: 68 IN | HEART RATE: 97 BPM | WEIGHT: 154.8 LBS | OXYGEN SATURATION: 98 %

## 2023-09-22 DIAGNOSIS — Z51.81 ENCOUNTER FOR MONITORING SUBOXONE MAINTENANCE THERAPY: ICD-10-CM

## 2023-09-22 DIAGNOSIS — Z79.899 ENCOUNTER FOR MONITORING SUBOXONE MAINTENANCE THERAPY: ICD-10-CM

## 2023-09-22 PROCEDURE — 99213 OFFICE O/P EST LOW 20 MIN: CPT | Performed by: INTERNAL MEDICINE

## 2023-09-22 RX ORDER — BUPRENORPHINE AND NALOXONE 2; .5 MG/1; MG/1
2 FILM, SOLUBLE BUCCAL; SUBLINGUAL DAILY
Qty: 30 FILM | Refills: 0 | Status: SHIPPED | OUTPATIENT
Start: 2023-09-22

## 2023-09-22 NOTE — PROGRESS NOTES
Name: Marivel Randall      : 1980      MRN: [id number]  Encounter Provider: Ean Villasenor MD  Encounter Date: 2023   Encounter department: [facility]     1. Encounter for monitoring Suboxone maintenance therapy  -     buprenorphine-naloxone (Suboxone) 2-0.5 mg; Place 1 Film (2 mg total) under the tongue daily           Subjective      Patient presents to the office for Suboxone maintenance and monitoring. She would like to taper off of Suboxone and is requesting a tapering schedule. We discussed various means of tapering and we will begin a taper which will reduce her dose by 1/2 mg every 2 weeks and then we will maintain on 1/2 mg daily dose for a month before initiating any additional taper. Review of Systems   Constitutional: Negative. Negative for activity change, appetite change, chills, diaphoresis, fatigue, fever and unexpected weight change. HENT: Negative. Eyes: Negative. Respiratory: Negative. Cardiovascular: Negative. Gastrointestinal: Negative. Endocrine: Negative. Genitourinary: Negative. Musculoskeletal: Negative. Skin: Negative. Neurological: Negative. Hematological: Negative. Psychiatric/Behavioral: The patient is not nervous/anxious.         Current Outpatient Medications on File Prior to Visit   Medication Sig   • mesalamine (PENTASA) 500 mg CR capsule Take 1 capsule (500 mg total) by mouth 3 (three) times a day   • rizatriptan (MAXALT) 10 mg tablet May repeat in 2 hours if needed   • sertraline (ZOLOFT) 100 mg tablet Take 2 tablets (200 mg total) by mouth daily   • venlafaxine (EFFEXOR-XR) 75 mg 24 hr capsule Take 1 capsule (75 mg total) by mouth daily   • [DISCONTINUED] buprenorphine-naloxone (Suboxone) 2-0.5 mg Place 2 Film (4 mg total) under the tongue daily   • [DISCONTINUED] magnesium (MAGTAB) 84 MG (7MEQ) TBCR Take 1 tablet (84 mg total) by mouth daily for 14 days Objective     /80 (BP Location: Left arm, Patient Position: Sitting, Cuff Size: Standard)   Pulse 97   Temp 98 °F (36.7 °C) (Tympanic)   Ht 5' 8.43" (1.738 m)   Wt 70.2 kg (154 lb 12.8 oz)   SpO2 98%   BMI 23.25 kg/m²     Physical Exam  Vitals reviewed. Constitutional:       General: She is not in acute distress. Appearance: Normal appearance. She is normal weight. She is not ill-appearing, toxic-appearing or diaphoretic. HENT:      Head: Normocephalic and atraumatic. Right Ear: External ear normal.      Left Ear: External ear normal.      Nose: Nose normal.   Eyes:      Conjunctiva/sclera: Conjunctivae normal.      Pupils: Pupils are equal, round, and reactive to light. Cardiovascular:      Rate and Rhythm: Normal rate. Pulmonary:      Effort: Pulmonary effort is normal. No respiratory distress. Abdominal:      General: Abdomen is flat. There is no distension. Musculoskeletal:         General: No swelling. Cervical back: Neck supple. No rigidity. Right lower leg: No edema. Left lower leg: No edema. Skin:     Coloration: Skin is not jaundiced. Findings: No bruising, erythema or rash. Neurological:      General: No focal deficit present. Mental Status: She is alert and oriented to person, place, and time. Mental status is at baseline.    Psychiatric:         Mood and Affect: Mood normal.         Behavior: Behavior normal.       Sarkis Catalan MD

## 2023-09-27 LAB
6-BETA NALTREXOL UR CFM-MCNC: NEGATIVE NG/ML
6MAM UR QL: NEGATIVE NG/ML
AMPHETAMINES UR QL: NEGATIVE NG/ML
BARBITURATES UR QL: NEGATIVE NG/ML
BENZODIAZ UR QL: NEGATIVE NG/ML
BUPRENORPHINE UR QL: POSITIVE NG/ML
BUPRENORPHINE UR-MCNC: 31 NG/ML
BUTYLONE: NEGATIVE NG/ML
BZE UR QL: NEGATIVE NG/ML
CREAT UR-MCNC: 50.6 MG/DL
ETHANOL UR QL: NEGATIVE NG/ML
FENTANYL: NEGATIVE NG/ML
GABAPENTIN UR-MCNC: NEGATIVE NG/ML
MDPV: NEGATIVE NG/ML
MEPHEDRONE: NEGATIVE NG/ML
METHADONE UR QL: NEGATIVE NG/ML
METHYLONE: NEGATIVE NG/ML
NALOXONE UR CFM-MCNC: 47 NG/ML
NALTREXONE UR-MCNC: NEGATIVE NG/ML
NORBUPRENORPHINE UR-MCNC: 44 NG/ML
OPIATES UR QL: NEGATIVE NG/ML
OXIDANTS UR QL: NEGATIVE MCG/ML
OXYCODONE UR QL: NEGATIVE NG/ML
PCP UR QL: NEGATIVE NG/ML
PH UR: 5.4 [PH] (ref 4.5–9)
SL AMB PREGABALIN: NEGATIVE NG/ML
SL AMB RITALINIC ACID: NEGATIVE NG/ML
THC UR QL: NORMAL NG/ML
THC UR-MCNC: NEGATIVE NG/ML

## 2023-09-29 NOTE — PROGRESS NOTES
Assessment/Plan   Problem List Items Addressed This Visit    None  Visit Diagnoses     Well woman exam    -  Primary    Late menses        Relevant Orders    POCT urine HCG    Routine screening for STI (sexually transmitted infection)        Relevant Orders    Chlamydia/GC amplified DNA by PCR    Trichomonas vaginalis Thin prep    History of abnormal cervical Pap smear        Relevant Orders    Liquid-based pap, screening          Discussion  I have discussed the importance of monthly self-breast exams, exercise and healthy diet as well as adequate intake of calcium and vitamin D. Encourage safe sexual practices; STI testing - desires  Contraception - bilateral salpingectomy    The current ASCCP guidelines were reviewed. Patient's last pap was 2020 but abnormal and therefore, a pap with HPV cotesting is  indicated at this time. Mammogram is UTD. Reviewed perimenopause and symptoms related with irregular menses, hot flashes, and night sweats. Encouraged use of supplements for hot flashes/night sweats along with lifestyle changes. All questions have been answered to her satisfaction  RTO for APE or sooner if needed      Subjective     HPI   Mirtha Patel is a 37 y.o. female who presents for annual well woman exam.     LMP - 08/13/23; She missed her menses the month of September; denies lifestyle or medication changes. Denies other changes in menses other than a skipped period. She is curious if she is perimenopausal.     No vulvar itch/burn; No vaginal itch/burn; No abn discharge or odor; No urinary sx - burning/pain/frequency/hematuria    (+) SBEs - no breast masses, asymmetry, nipple discharge or bleeding, changes in skin of breast, or breast tenderness bilaterally    No abd/pelvic pain or HAs;     Menopausal symptoms: +night sweats; new within the past 2 weeks. Tolerable. She is currently weaning off Suboxone, similar timeframe as when night sweats started. Pt is sexually active.  No issues with intercourse; Feels safe at home    Current contraception: bilateral salpingectomy  Condom use:    (+) PCP for routine Bw/care; Last Pap -  LSIL, HPVHR+; colpo benign    Last mammo - 2023 BIRADS1    Last colonoscopy - 2023 neg    Review of Systems   Constitutional: Negative for fatigue. Eyes: Negative for photophobia and visual disturbance. Respiratory: Negative for cough and shortness of breath. Cardiovascular: Negative for chest pain and palpitations. Gastrointestinal: Negative for abdominal pain, blood in stool, constipation, diarrhea, nausea and rectal pain. Genitourinary: Positive for menstrual problem. Negative for dyspareunia, dysuria, flank pain, frequency, genital sores, pelvic pain, urgency, vaginal bleeding, vaginal discharge and vaginal pain. Musculoskeletal: Negative for arthralgias and back pain. Skin: Negative for rash. Neurological: Negative for weakness and headaches. The following portions of the patient's history were reviewed and updated as appropriate: allergies, current medications, past family history, past medical history, past social history, past surgical history and problem list.         OB History        6    Para   3    Term   3            AB   3    Living   4       SAB        IAB   3    Ectopic        Multiple   1    Live Births   4           Obstetric Comments   ABNORMAL MENSES               Past Medical History:   Diagnosis Date   • Acute pharyngitis due to other specified organisms 2021   • Arthritis     shoulders   • Consultation for female sterilization 3/12/2021   • Crohn disease ([address])    • Depression    • Depression affecting pregnancy in third trimester, antepartum 10/26/2020   • Drug abuse ([address])    • Ganglion cyst of wrist, right     LAST ASSESSED: [id number]   • Gastritis    • Hepatitis C     dx 15 yrs ago- retested told undetectable. FIBROSCAN 10/2016  F0-F1.   Began treatment with Harvoni 3/2017   • Hepatitis C virus infection     LAST ASSESSED: [id number]   • History of heroin abuse ([address] ) 2015    Description: Sober since 3/2020   • Hordeolum externum left eye, unspecified eyelid     LAST ASSESSED: [id number]   • Infectious viral hepatitis     hep c treated   • Pyelonephritis     pyelonephritis   • Shingles    • Stargardt's disease    • Stargardts Disease     dx when 23years old   • Tear of right scapholunate ligament     LAST ASSESSED: [id number]   • Varicella     as child   • Wears glasses     reading       Past Surgical History:   Procedure Laterality Date   •  SECTION      LAST ASSESSED: [id number]   • INDUCED       SURIGCAL TREATMENT FOR    • LIVER BIOPSY N/A    • MA ESOPHAGOGASTRODUODENOSCOPY TRANSORAL DIAGNOSTIC N/A 2016    Procedure: ESOPHAGOGASTRODUODENOSCOPY (EGD); Surgeon: Gloria Adair MD;  Location: [facility]; Service: Gastroenterology   • MA LAPAROSCOPY W/RMVL ADNEXAL STRUCTURES Bilateral 2021    Procedure: SALPINGECTOMY, LAPAROSCOPIC;  Surgeon: Denia Hudson MD;  Location: [facility];  Service: Gynecology   • MA NEUROPLASTY &/TRANSPOS MEDIAN NRV CARPAL Vanice Latus Left 2021    Procedure: CARPAL TUNNEL RELEASE;  Surgeon: Sammy Buitrago MD;  Location: [facility];  Service: Orthopedics   • WISDOM TOOTH EXTRACTION         Family History   Problem Relation Age of Onset   • Heart attack Mother    • Cancer Mother         LEIOMYOSARCOMA   • Seizures Father         EPILEPSY   • Stroke Father    • No Known Problems Daughter    • Heart defect Daughter         ASD?    • Diabetes Brother    • Alcohol abuse Brother    • Mental illness Brother    • Hypertension Brother    • No Known Problems Brother    • Kidney disease Brother    • Bipolar disorder Brother    • No Known Problems Son    • No Known Problems Son    • No Known Problems Maternal Aunt    • No Known Problems Maternal Aunt    • No Known Problems Paternal Aunt        Social History     Socioeconomic History   • Marital status:      Spouse name: Not on file   • Number of children: Not on file   • Years of education: Not on file   • Highest education level: Not on file   Occupational History   • Not on file   Tobacco Use   • Smoking status: Every Day     Packs/day: 1.00     Years: 20.00     Total pack years: 20.00     Types: Cigarettes     Start date: 1996   • Smokeless tobacco: Never   • Tobacco comments:     encouraged smoking cessation   Vaping Use   • Vaping Use: Every day   • Start date: 1/1/2022   • Last attempt to quit: 7/10/2023   • Substances: Nicotine, Flavoring   Substance and Sexual Activity   • Alcohol use: No     Comment: former ETOH abuse-per pt   • Drug use: No     Comment: former addict-per doctor PMH, H/O INTRAVENOUS DRUG USE IN REMISSION   • Sexual activity: Not Currently     Partners: Male   Other Topics Concern   • Not on file   Social History Narrative    DAILY [employer], 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     Social Determinants of Health     Financial Resource Strain: Not on file   Food Insecurity: Not on file   Transportation Needs: Not on file   Physical Activity: Not on file   Stress: Not on file   Social Connections: Not on file   Intimate Partner Violence: Not on file   Housing Stability: Not on file         Current Outpatient Medications:   •  buprenorphine-naloxone (Suboxone) 2-0.5 mg, Place 1 Film (2 mg total) under the tongue daily, Disp: 30 Film, Rfl: 0  •  mesalamine (PENTASA) 500 mg CR capsule, Take 1 capsule (500 mg total) by mouth 3 (three) times a day, Disp: 270 capsule, Rfl: 3  •  rizatriptan (MAXALT) 10 mg tablet, May repeat in 2 hours if needed, Disp: 27 tablet, Rfl: 1  •  sertraline (ZOLOFT) 100 mg tablet, Take 2 tablets (200 mg total) by mouth daily, Disp: 180 tablet, Rfl: 1  •  venlafaxine (EFFEXOR-XR) 75 mg 24 hr capsule, Take 1 capsule (75 mg total) by mouth daily, Disp: 90 capsule, Rfl: 1    No Known Allergies    Objective   Vitals:    10/02/23 1017   BP: 120/76   BP Location: Left arm   Patient Position: Sitting   Cuff Size: Standard   Weight: 68.2 kg (150 lb 4.8 oz)   Height: 5' 8.43" (1.738 m)     Physical Exam  Vitals and nursing note reviewed. Constitutional:       Appearance: Normal appearance. She is well-developed and normal weight. HENT:      Head: Normocephalic and atraumatic. Eyes:      Conjunctiva/sclera: Conjunctivae normal.   Cardiovascular:      Rate and Rhythm: Normal rate and regular rhythm. Heart sounds: Normal heart sounds. Pulmonary:      Effort: Pulmonary effort is normal.      Breath sounds: Normal breath sounds. Chest:   Breasts:     Breasts are symmetrical.      Right: Normal. No inverted nipple, mass, nipple discharge, skin change or tenderness. Left: Normal. No inverted nipple, mass, nipple discharge, skin change or tenderness. Abdominal:      General: Abdomen is flat. There is no distension. Palpations: Abdomen is soft. There is no mass. Tenderness: There is no abdominal tenderness. There is no right CVA tenderness or left CVA tenderness. Genitourinary:     General: Normal vulva. Exam position: Lithotomy position. Pubic Area: No rash or pubic lice. Labia:         Right: No rash or tenderness. Left: No rash or tenderness. Urethra: No urethral pain. Vagina: Normal. No vaginal discharge. Cervix: Normal.      Uterus: Normal. No uterine prolapse. Adnexa: Right adnexa normal and left adnexa normal.        Right: No mass or tenderness. Left: No mass or tenderness. Musculoskeletal:         General: No tenderness. Normal range of motion. Cervical back: Normal range of motion. No tenderness. Right lower leg: No edema. Left lower leg: No edema. Lymphadenopathy:      Cervical: No cervical adenopathy. Upper Body:      Right upper body: No supraclavicular or axillary adenopathy. Left upper body: No supraclavicular or axillary adenopathy.       Lower Body: No right inguinal adenopathy. No left inguinal adenopathy. Skin:     General: Skin is warm and dry. Neurological:      Mental Status: She is alert and oriented to person, place, and time. Motor: No weakness. Psychiatric:         Mood and Affect: Mood normal.         Behavior: Behavior normal.         Thought Content:  Thought content normal.         Judgment: Judgment normal.         Patient Instructions   [facility] Complete

## 2023-10-02 ENCOUNTER — ANNUAL EXAM (OUTPATIENT)
Dept: OBGYN CLINIC | Facility: CLINIC | Age: 43
End: 2023-10-02
Payer: MEDICARE

## 2023-10-02 VITALS
WEIGHT: 150.3 LBS | SYSTOLIC BLOOD PRESSURE: 120 MMHG | HEIGHT: 68 IN | DIASTOLIC BLOOD PRESSURE: 76 MMHG | BODY MASS INDEX: 22.78 KG/M2

## 2023-10-02 DIAGNOSIS — Z01.419 WELL WOMAN EXAM: Primary | ICD-10-CM

## 2023-10-02 DIAGNOSIS — Z11.3 ROUTINE SCREENING FOR STI (SEXUALLY TRANSMITTED INFECTION): ICD-10-CM

## 2023-10-02 DIAGNOSIS — N92.6 LATE MENSES: ICD-10-CM

## 2023-10-02 DIAGNOSIS — Z87.42 HISTORY OF ABNORMAL CERVICAL PAP SMEAR: ICD-10-CM

## 2023-10-02 LAB — SL AMB POCT URINE HCG: NEGATIVE

## 2023-10-02 PROCEDURE — G0476 HPV COMBO ASSAY CA SCREEN: HCPCS

## 2023-10-02 PROCEDURE — 87491 CHLMYD TRACH DNA AMP PROBE: CPT

## 2023-10-02 PROCEDURE — 87591 N.GONORRHOEAE DNA AMP PROB: CPT

## 2023-10-02 PROCEDURE — 81025 URINE PREGNANCY TEST: CPT

## 2023-10-02 PROCEDURE — 87661 TRICHOMONAS VAGINALIS AMPLIF: CPT

## 2023-10-02 PROCEDURE — G0145 SCR C/V CYTO,THINLAYER,RESCR: HCPCS

## 2023-10-02 PROCEDURE — G0101 CA SCREEN;PELVIC/BREAST EXAM: HCPCS

## 2023-10-03 LAB
HPV HR 12 DNA CVX QL NAA+PROBE: NEGATIVE
HPV16 DNA CVX QL NAA+PROBE: NEGATIVE
HPV18 DNA CVX QL NAA+PROBE: NEGATIVE

## 2023-10-04 LAB
C TRACH DNA SPEC QL NAA+PROBE: NEGATIVE
N GONORRHOEA DNA SPEC QL NAA+PROBE: NEGATIVE

## 2023-10-05 LAB — T VAGINALIS DNA SPEC QL NAA+PROBE: NEGATIVE

## 2023-10-09 ENCOUNTER — TELEPHONE (OUTPATIENT)
Age: 43
End: 2023-10-09

## 2023-10-09 ENCOUNTER — TELEMEDICINE (OUTPATIENT)
Age: 43
End: 2023-10-09
Payer: MEDICARE

## 2023-10-09 DIAGNOSIS — Z79.899 ENCOUNTER FOR MONITORING SUBOXONE MAINTENANCE THERAPY: Primary | ICD-10-CM

## 2023-10-09 DIAGNOSIS — Z51.81 ENCOUNTER FOR MONITORING SUBOXONE MAINTENANCE THERAPY: Primary | ICD-10-CM

## 2023-10-09 PROBLEM — M54.2 NECK PAIN: Status: RESOLVED | Noted: 2019-08-29 | Resolved: 2023-10-09

## 2023-10-09 LAB
LAB AP GYN PRIMARY INTERPRETATION: NORMAL
Lab: NORMAL
PATH INTERP SPEC-IMP: NORMAL

## 2023-10-09 PROCEDURE — 99213 OFFICE O/P EST LOW 20 MIN: CPT | Performed by: INTERNAL MEDICINE

## 2023-10-09 NOTE — PROGRESS NOTES
Virtual Regular Visit    Verification of patient location:    Patient is located at Other in the following state in which I hold an active license [place]      Assessment/Plan:    Problem List Items Addressed This Visit        Other    Encounter for monitoring Suboxone maintenance therapy - Primary            Reason for visit is   Chief Complaint   Patient presents with   • Virtual Regular Visit     Patient c/o insomnia, racing, intrusive thoughts and heart pounding x 3-4 days since weaning off Suboxone. Encounter provider Khloe Caba MD    Provider located at [address] [place] [address].  [address] [address] [phone]      Recent Visits  No visits were found meeting these conditions. Showing recent visits within past 7 days and meeting all other requirements  Today's Visits  Date Type Provider Dept   10/09/23 Telemedicine [address]   10/09/23 Telephone Manoj Braxton MA [facility]   Showing today's visits and meeting all other requirements  Future Appointments  No visits were found meeting these conditions. Showing future appointments within next 150 days and meeting all other requirements       The patient was identified by name and date of birth. Mirtha Patel was informed that this is a telemedicine visit and that the visit is being conducted through the [address] [place] [address] [employer] platform. She agrees to proceed. .  My office door was closed. No one else was in the room. She acknowledged consent and understanding of privacy and security of the video platform. The patient has agreed to participate and understands they can discontinue the visit at any time. Patient is aware this is a billable service.      Subjective  Mirtha Patel is a 37 y.o. female seen virtually while she is being evaluated in the emergency room at [facility]. The patient is currently on Suboxone maintenance and monitoring. She has been tapering her Suboxone gradually. She has been down to 2 mg and now recently 1 mg and appears to be experiencing some significant withdrawal symptoms including insomnia, palpitations, intrusive thoughts and did not want to take more Suboxone to relieve her symptoms. She presented to the emergency room and according to the patient she will be prescribed a combination of hydroxyzine, clonidine and gabapentin in effort to mitigate her withdrawal symptoms. She wanted reassurance that we would be able to  and continue these medications while she continues in her quest to withdraw from Suboxone therapy. Past Medical History:   Diagnosis Date   • Acute pharyngitis due to other specified organisms 2021   • Arthritis     shoulders   • Consultation for female sterilization 3/12/2021   • Crohn disease ([address])    • Depression    • Depression affecting pregnancy in third trimester, antepartum 10/26/2020   • Drug abuse ([address])    • Ganglion cyst of wrist, right     LAST ASSESSED: [id number]   • Gastritis    • Hepatitis C     dx 15 yrs ago- retested told undetectable. FIBROSCAN 10/2016  F0-F1.   Began treatment with Harvoni 3/2017   • Hepatitis C virus infection     LAST ASSESSED: [id number]   • History of heroin abuse ([address]) 2015    Description: Sober since 3/2020   • Hordeolum externum left eye, unspecified eyelid     LAST ASSESSED: [id number]   • Infectious viral hepatitis     hep c treated   • Pyelonephritis     pyelonephritis   • Shingles    • Stargardt's disease    • Stargardts Disease     dx when 23years old   • Tear of right scapholunate ligament     LAST ASSESSED: [id number]   • Varicella     as child   • Wears glasses     reading       Past Surgical History:   Procedure Laterality Date   •  SECTION      LAST ASSESSED: [id number]   • INDUCED       SURIGCAL TREATMENT FOR    • LIVER BIOPSY N/A    • TN ESOPHAGOGASTRODUODENOSCOPY TRANSORAL DIAGNOSTIC N/A 2016    Procedure: ESOPHAGOGASTRODUODENOSCOPY (EGD); Surgeon: Russel Sue MD;  [facility]; Service: Gastroenterology   • TN LAPAROSCOPY W/RMVL ADNEXAL STRUCTURES Bilateral 2021    Procedure: SALPINGECTOMY, LAPAROSCOPIC;  Surgeon: Harish Schuler MD;  [facility];  Service: Gynecology   • TN NEUROPLASTY &/TRANSPOS MEDIAN NRV CARPAL Zulma Noon Left 2021    Procedure: CARPAL TUNNEL RELEASE;  Surgeon: Shun Feng MD;  [facility];  Service: Orthopedics   • WISDOM TOOTH EXTRACTION         Current Outpatient Medications   Medication Sig Dispense Refill   • buprenorphine-naloxone (Suboxone) 2-0.5 mg Place 1 Film (2 mg total) under the tongue daily (Patient taking differently: Place 1 mg under the tongue daily) 30 Film 0   • mesalamine (PENTASA) 500 mg CR capsule Take 1 capsule (500 mg total) by mouth 3 (three) times a day 270 capsule 3   • rizatriptan (MAXALT) 10 mg tablet May repeat in 2 hours if needed 27 tablet 1   • sertraline (ZOLOFT) 100 mg tablet Take 2 tablets (200 mg total) by mouth daily 180 tablet 1   • venlafaxine (EFFEXOR-XR) 75 mg 24 hr capsule Take 1 capsule (75 mg total) by mouth daily 90 capsule 1     No current facility-administered medications for this visit. No Known Allergies    Review of Systems   Constitutional: Positive for appetite change. HENT: Negative. Eyes: Negative. Respiratory: Negative. Cardiovascular: Positive for palpitations. Gastrointestinal: Positive for nausea. Genitourinary: Negative. Musculoskeletal: Positive for myalgias. Skin: Negative. Allergic/Immunologic: Negative. Neurological: Negative. Hematological: Negative. Psychiatric/Behavioral: Positive for decreased concentration and sleep disturbance. The patient is nervous/anxious. Video Exam    There were no vitals filed for this visit.     Physical Exam  Vitals reviewed. Constitutional:       General: She is not in acute distress. Appearance: Normal appearance. She is normal weight. She is not ill-appearing, toxic-appearing or diaphoretic. HENT:      Head: Normocephalic and atraumatic. Right Ear: External ear normal.      Left Ear: External ear normal.   Eyes:      General: No scleral icterus. Conjunctiva/sclera: Conjunctivae normal.      Pupils: Pupils are equal, round, and reactive to light. Cardiovascular:      Rate and Rhythm: Normal rate. Pulmonary:      Effort: Pulmonary effort is normal. No respiratory distress. Abdominal:      General: Abdomen is flat. There is no distension. Musculoskeletal:      Cervical back: Neck supple. Right lower leg: No edema. Left lower leg: No edema. Skin:     General: Skin is warm and dry. Coloration: Skin is not jaundiced. Findings: No erythema or rash. Neurological:      General: No focal deficit present. Mental Status: She is alert and oriented to person, place, and time. Mental status is at baseline. Psychiatric:         Mood and Affect: Mood normal.         Behavior: Behavior normal.         Thought Content:  Thought content normal.         Judgment: Judgment normal.          Visit Time  Total Visit Duration: 5 minutes

## 2023-10-09 NOTE — TELEPHONE ENCOUNTER
Patient is weaning off of suboxone. The last 3-4 days, she has been experiencing: Insomnia  Racing thoughts, intrusive thoughts,  Heart pounding    Patient prefers not to have to go to the ER so she is asking if you are able to see her or if you have any recommendations to help with these symptoms.

## 2023-10-09 NOTE — TELEPHONE ENCOUNTER
Patient called to ask for us to send medication list, recent labs and last 2 office visit notes to her psychiatrist.    Patient had a virtual visit today with Dr. Isabella Jackson so she called the psychiatrist per Dr. Tom Su instructions. Patient will ask psych to fax request asap and make sure their fax number is on the form so we can fax back the information they ar requesting. [phone] fax number given to patient.

## 2023-10-17 ENCOUNTER — TRANSITIONAL CARE MANAGEMENT (OUTPATIENT)
Dept: INTERNAL MEDICINE CLINIC | Facility: OTHER | Age: 43
End: 2023-10-17

## 2023-10-18 ENCOUNTER — TELEPHONE (OUTPATIENT)
Dept: OBGYN CLINIC | Facility: CLINIC | Age: 43
End: 2023-10-18

## 2023-10-18 NOTE — TELEPHONE ENCOUNTER
Pt ani, returning our call from last week to discuss results from her annual. Patient was recently hospitalized and was just discharged. Would like a call back.

## 2023-10-20 ENCOUNTER — HOSPITAL ENCOUNTER (OUTPATIENT)
Dept: ULTRASOUND IMAGING | Facility: CLINIC | Age: 43
Discharge: HOME | End: 2023-10-20
Payer: MEDICARE

## 2023-10-20 DIAGNOSIS — N63.12 MASS OF UPPER INNER QUADRANT OF RIGHT BREAST: ICD-10-CM

## 2023-10-20 DIAGNOSIS — Z09 FOLLOW-UP EXAM: ICD-10-CM

## 2023-10-20 PROCEDURE — 76642 ULTRASOUND BREAST LIMITED: CPT

## 2023-10-23 ENCOUNTER — TELEMEDICINE (OUTPATIENT)
Age: 43
End: 2023-10-23
Payer: MEDICARE

## 2023-10-23 DIAGNOSIS — Z51.81 ENCOUNTER FOR MONITORING SUBOXONE MAINTENANCE THERAPY: ICD-10-CM

## 2023-10-23 DIAGNOSIS — F31.78 BIPOLAR DISORDER, IN FULL REMISSION, MOST RECENT EPISODE MIXED (HCC): ICD-10-CM

## 2023-10-23 DIAGNOSIS — F11.93 OPIOID WITHDRAWAL (HCC): Primary | ICD-10-CM

## 2023-10-23 DIAGNOSIS — Z79.899 ENCOUNTER FOR MONITORING SUBOXONE MAINTENANCE THERAPY: ICD-10-CM

## 2023-10-23 DIAGNOSIS — F32.A DEPRESSION, UNSPECIFIED DEPRESSION TYPE: ICD-10-CM

## 2023-10-23 PROBLEM — F31.64 BIPOLAR DISORD, CRNT EPISODE MIXED, SEVERE, W PSYCH FEATURES (HCC): Status: ACTIVE | Noted: 2023-10-11

## 2023-10-23 PROCEDURE — 99495 TRANSJ CARE MGMT MOD F2F 14D: CPT | Performed by: INTERNAL MEDICINE

## 2023-10-23 RX ORDER — GABAPENTIN 300 MG/1
300 CAPSULE ORAL
COMMUNITY
Start: 2023-10-09 | End: 2023-10-23

## 2023-10-23 RX ORDER — MIRTAZAPINE 15 MG/1
15 TABLET, FILM COATED ORAL
Qty: 90 TABLET | Refills: 1 | Status: SHIPPED | OUTPATIENT
Start: 2023-10-23

## 2023-10-23 RX ORDER — CLONIDINE HYDROCHLORIDE 0.2 MG/1
0.2 TABLET ORAL 2 TIMES DAILY
Qty: 180 TABLET | Refills: 1 | Status: SHIPPED | OUTPATIENT
Start: 2023-10-23

## 2023-10-23 RX ORDER — GABAPENTIN 100 MG/1
100 CAPSULE ORAL 3 TIMES DAILY
COMMUNITY
Start: 2023-10-11 | End: 2023-10-23

## 2023-10-23 RX ORDER — SERTRALINE HYDROCHLORIDE 100 MG/1
TABLET, FILM COATED ORAL
Qty: 180 TABLET | Refills: 1 | Status: SHIPPED | OUTPATIENT
Start: 2023-10-23

## 2023-10-23 RX ORDER — CLONIDINE HYDROCHLORIDE 0.1 MG/1
TABLET ORAL
COMMUNITY
Start: 2023-10-09 | End: 2023-10-23

## 2023-10-23 NOTE — ASSESSMENT & PLAN NOTE
Sertraline restarted. Remeron increased to 15 mg at at bedtime.   Cautious monitoring for any signs of serotonin syndrome

## 2023-10-23 NOTE — ASSESSMENT & PLAN NOTE
Patient continues at her request to decrease and discontinue her use of Suboxone. She is down to one third of a 2 mg strip every day. We will continue the same dose for approximately 2 weeks then attempt alternate day dosing. ..   Patient was also provided with a prescription for clonidine 0.2 mg every 12 hours as needed for withdrawal symptoms

## 2023-10-23 NOTE — PROGRESS NOTES
Virtual Regular Visit    Verification of patient location:    Patient is located at Other in the following state in which I hold an active license [place]      Assessment/Plan:    Problem List Items Addressed This Visit          Other    Depression     Sertraline restarted. Remeron increased to 15 mg at at bedtime. Cautious monitoring for any signs of serotonin syndrome         Relevant Medications    sertraline (ZOLOFT) 100 mg tablet    cloNIDine (CATAPRES) 0.2 mg tablet    mirtazapine (REMERON) 15 mg tablet    Encounter for monitoring Suboxone maintenance therapy     Patient continues at her request to decrease and discontinue her use of Suboxone. She is down to one third of a 2 mg strip every day. We will continue the same dose for approximately 2 weeks then attempt alternate day dosing. .. Patient was also provided with a prescription for clonidine 0.2 mg every 12 hours as needed for withdrawal symptoms         Bipolar disorder, in full remission, most recent episode mixed ([address])     Stable. No agitation activity. Patient discontinued Zyprexa on her own because she did not feel well on the medication. She is sleeping somewhat better with Remeron 7.5 mg. We will increase it to 15 mg at bedtime. She would like to restart her Zoloft. We will initially restart at 100 mg and then increase to 200 mg as tolerated.   Venlafaxine will remain discontinued         Relevant Medications    sertraline (ZOLOFT) 100 mg tablet    mirtazapine (REMERON) 15 mg tablet     Other Visit Diagnoses       Opioid withdrawal ([address])    -  Primary    Relevant Medications    cloNIDine (CATAPRES) 0.2 mg tablet                 Reason for visit is   Chief Complaint   Patient presents with    Virtual Transitional care      Patient was [facility] from [facility] on [facility]16 [facility] Screening     Patient would not like to have colonoscopy done right now    Virtual Regular Visit        Encounter provider Jazmine Gannon MD    Provider located at [address]  [address] [facility] [place] [address] [phone]      Recent Visits  No visits were found meeting these conditions. Showing recent visits within past 7 days and meeting all other requirements  Today's Visits  Date Type Provider Dept   10/23/23 Telemedicine Constance Aguilar, [phone]  [facility] today's visits and meeting all other requirements  Future Appointments  No visits were found meeting these conditions. Showing future appointments within next 150 days and meeting all other requirements       The patient was identified by name and date of birth. Mirtha Patel was informed that this is a telemedicine visit and that the visit is being conducted through the [address] [place] [address] [employer] platform. She agrees to proceed. .  My office door was closed. No one else was in the room. She acknowledged consent and understanding of privacy and security of the video platform. The patient has agreed to participate and understands they can discontinue the visit at any time. Patient is aware this is a billable service. Subjective  Mirtha Patel is a 37 y.o. female seen virtually while at work and unable to come into the office. Patient is seen virtually for follow-up visit. She reports that she did not feel well on Zyprexa, that it made her feel dull. She feels much better now being off the medication. She is sleeping better with the Remeron but would like something additional.  We discussed a dose adjustment to the Remeron and the patient was agreeable to that. Placed back on her sertraline dosing. Recommended that we not begin previous dose of 200 mg daily and resume the medication at 100 mg daily and gradually increase. She is decreased her use of Suboxone to  approximately . 67 mg daily.   I encouraged her to continue at this dose to let the medication even out in her system before we attempted additional dose reductions and she was agreeable. Otherwise she feels well. Past Medical History:   Diagnosis Date    Acute pharyngitis due to other specified organisms 2021    Arthritis     shoulders    Consultation for female sterilization 2021    Crohn disease ([address])     Depression     Depression affecting pregnancy in third trimester, antepartum 10/26/2020    Drug abuse ([address])     Ganglion cyst of wrist, right     LAST ASSESSED: [id number]    Gastritis     Hepatitis C     dx 15 yrs ago-2015 retested told undetectable. FIBROSCAN 10/2016  F0-F1. Began treatment with Harvoni 3/2017    Hepatitis C virus infection     LAST ASSESSED: [id number]    History of heroin abuse ([address]) 2015    Description: Sober since 3/2020    Hordeolum externum left eye, unspecified eyelid     LAST ASSESSED: [id number]    Infectious viral hepatitis     hep c treated    Pyelonephritis     pyelonephritis    Shingles     Stargardt's disease     Stargardts Disease     dx when 23years old    Suboxone maintenance treatment complicating pregnancy, antepartum, third trimester ([address])     Will transfer her Suboxone care to Dr. Frankie Reed 2-4 weeks after delivery    Tear of right scapholunate ligament     LAST ASSESSED: [id number]    Varicella     as child    Wears glasses     reading       Past Surgical History:   Procedure Laterality Date     SECTION      LAST ASSESSED: [id number]    INDUCED       SURIGCAL TREATMENT FOR     LIVER BIOPSY N/A     DC ESOPHAGOGASTRODUODENOSCOPY TRANSORAL DIAGNOSTIC N/A 2016    Procedure: ESOPHAGOGASTRODUODENOSCOPY (EGD); Surgeon: Aidan Ramírez MD;  [facility];   Service: Gastroenterology    DC LAPAROSCOPY W/RMVL ADNEXAL STRUCTURES Bilateral 2021    Procedure: SALPINGECTOMY, LAPAROSCOPIC;  Surgeon: Nolan Romero MD;  [facility];  Service: Gynecology    DC NEUROPLASTY &/TRANSPOS MEDIAN NRV CARPAL TUNNJUDY Left 4/2/2021    Procedure: CARPAL TUNNEL RELEASE;  Surgeon: Blanca Olivares MD;  [facility];  Service: Orthopedics    WISDOM TOOTH EXTRACTION         Current Outpatient Medications   Medication Sig Dispense Refill    buprenorphine-naloxone (Suboxone) 2-0.5 mg Place 1 Film (2 mg total) under the tongue daily (Patient taking differently: Place 1 mg under the tongue daily) 30 Film 0    cloNIDine (CATAPRES) 0.2 mg tablet Take 1 tablet (0.2 mg total) by mouth 2 (two) times a day 180 tablet 1    mesalamine (PENTASA) 500 mg CR capsule Take 1 capsule (500 mg total) by mouth 3 (three) times a day 270 capsule 3    mirtazapine (REMERON) 15 mg tablet Take 1 tablet (15 mg total) by mouth daily at bedtime 90 tablet 1    rizatriptan (MAXALT) 10 mg tablet May repeat in 2 hours if needed 27 tablet 1    sertraline (ZOLOFT) 100 mg tablet 1 tablet daily for 2 weeks then increase to 2 tablets daily 180 tablet 1     No current facility-administered medications for this visit. No Known Allergies    Review of Systems   Constitutional: Negative. Negative for activity change, appetite change, chills, diaphoresis, fatigue, fever and unexpected weight change. HENT: Negative. Eyes: Negative. Respiratory: Negative. Cardiovascular: Negative. Gastrointestinal: Negative. Endocrine: Negative. Genitourinary: Negative. Musculoskeletal: Negative. Skin: Negative. Neurological: Negative. Hematological: Negative. Psychiatric/Behavioral:  Positive for sleep disturbance (Difficulty falling and staying asleep). The patient is not nervous/anxious. Video Exam    There were no vitals filed for this visit. Physical Exam  Constitutional:       General: She is not in acute distress. Appearance: Normal appearance. She is normal weight. She is not ill-appearing, toxic-appearing or diaphoretic. HENT:      Head: Normocephalic and atraumatic.       Right Ear: External ear normal. Left Ear: External ear normal.   Eyes:      General: No scleral icterus. Conjunctiva/sclera: Conjunctivae normal.      Pupils: Pupils are equal, round, and reactive to light. Neck:      Vascular: No JVD. Trachea: No tracheal deviation. Pulmonary:      Effort: Pulmonary effort is normal. No respiratory distress. Abdominal:      General: Abdomen is flat. There is no distension. Musculoskeletal:         General: No swelling. Cervical back: Neck supple. No rigidity. Right lower leg: No edema. Left lower leg: No edema. Skin:     Coloration: Skin is not jaundiced. Findings: No bruising, erythema or rash. Neurological:      General: No focal deficit present. Mental Status: She is alert and oriented to person, place, and time. Mental status is at baseline. Psychiatric:         Mood and Affect: Mood normal.         Behavior: Behavior normal.         Thought Content:  Thought content normal.         Judgment: Judgment normal.          Visit Time  Total Visit Duration:10 minutes

## 2023-10-23 NOTE — ASSESSMENT & PLAN NOTE
Stable. No agitation activity. Patient discontinued Zyprexa on her own because she did not feel well on the medication. She is sleeping somewhat better with Remeron 7.5 mg. We will increase it to 15 mg at bedtime. She would like to restart her Zoloft. We will initially restart at 100 mg and then increase to 200 mg as tolerated.   Venlafaxine will remain discontinued

## 2023-11-10 ENCOUNTER — TRANSITIONAL CARE MANAGEMENT (OUTPATIENT)
Dept: INTERNAL MEDICINE CLINIC | Facility: OTHER | Age: 43
End: 2023-11-10

## 2023-11-10 ENCOUNTER — TELEPHONE (OUTPATIENT)
Dept: INTERNAL MEDICINE CLINIC | Facility: OTHER | Age: 43
End: 2023-11-10

## 2023-12-07 ENCOUNTER — TELEMEDICINE (OUTPATIENT)
Dept: INTERNAL MEDICINE CLINIC | Facility: OTHER | Age: 43
End: 2023-12-07
Payer: MEDICARE

## 2023-12-07 VITALS — HEIGHT: 68 IN | TEMPERATURE: 97.8 F | WEIGHT: 166 LBS | BODY MASS INDEX: 25.16 KG/M2

## 2023-12-07 DIAGNOSIS — J02.9 ACUTE PHARYNGITIS, UNSPECIFIED ETIOLOGY: Primary | ICD-10-CM

## 2023-12-07 PROCEDURE — 99213 OFFICE O/P EST LOW 20 MIN: CPT | Performed by: INTERNAL MEDICINE

## 2023-12-07 RX ORDER — AZITHROMYCIN 250 MG/1
TABLET, FILM COATED ORAL
Qty: 6 TABLET | Refills: 0 | Status: SHIPPED | OUTPATIENT
Start: 2023-12-07 | End: 2023-12-12

## 2023-12-07 NOTE — PROGRESS NOTES
Virtual Regular Visit    Verification of patient location:    Patient is located at Home in the following state in which I hold an active license [place]      Assessment/Plan:    Problem List Items Addressed This Visit          Digestive    Acute pharyngitis - Primary    Relevant Medications    azithromycin (Zithromax) 250 mg tablet            Reason for visit is   Chief Complaint   Patient presents with    Cold Like Symptoms     Symptoms include since Monday: sneezing, sinus headache, both ears feels painful, sore throat, productive cough. Patient took home covid test: negative    Virtual Regular Visit        Encounter provider Pelon Walker MD    Provider located at [address]      Recent Visits  No visits were found meeting these conditions. Showing recent visits within past 7 days and meeting all other requirements  Today's Visits  Date Type Provider Dept   12/07/23 Telemedicine Pelon Walker MD [facility]   Showing today's visits and meeting all other requirements  Future Appointments  No visits were found meeting these conditions. Showing future appointments within next 150 days and meeting all other requirements       The patient was identified by name and date of birth. Mirtha Patel was informed that this is a telemedicine visit and that the visit is being conducted through the [employer]. She agrees to proceed. .  My office door was closed. No one else was in the room. She acknowledged consent and understanding of privacy and security of the video platform. The patient has agreed to participate and understands they can discontinue the visit at any time. Patient is aware this is a billable service. Subjective  Mirtha Patel is a 37 y.o. female sore throat and sinus congestion.       Patient complaining of sore throat and sinus congestion. Started about a week ago not getting better. No fever or chills. Also noticed tenderness on both maxillary sinuses. Past Medical History:   Diagnosis Date    Acute pharyngitis due to other specified organisms 2021    Arthritis     shoulders    Consultation for female sterilization 2021    Crohn disease ([address])     Depression     Depression affecting pregnancy in third trimester, antepartum 10/26/2020    Drug abuse ([address])     Ganglion cyst of wrist, right     LAST ASSESSED: [id number]    Gastritis     Hepatitis C     dx 15 yrs ago- retested told undetectable. FIBROSCAN 10/2016  F0-F1. Began treatment with Harvoni 3/2017    Hepatitis C virus infection     LAST ASSESSED: [id number]    History of heroin abuse ([address]) 2015    Description: Sober since 3/2020    Hordeolum externum left eye, unspecified eyelid     LAST ASSESSED: [id number]    Infectious viral hepatitis     hep c treated    Pyelonephritis     pyelonephritis    Shingles     Stargardt's disease     Stargardts Disease     dx when 23years old    Suboxone maintenance treatment complicating pregnancy, antepartum, third trimester ([address])     Will transfer her Suboxone care to Dr. Dorthey Schlatter 2-4 weeks after delivery    Tear of right scapholunate ligament     LAST ASSESSED: [id number]    Varicella     as child    Wears glasses     reading       Past Surgical History:   Procedure Laterality Date     SECTION      LAST ASSESSED: [id number]    INDUCED       SURIGCAL TREATMENT FOR     LIVER BIOPSY N/A     ND ESOPHAGOGASTRODUODENOSCOPY TRANSORAL DIAGNOSTIC N/A 2016    Procedure: ESOPHAGOGASTRODUODENOSCOPY (EGD); Surgeon: Marshall oMran MD;  Location: [facility];   Service: Gastroenterology    ND LAPAROSCOPY W/RMVL ADNEXAL STRUCTURES Bilateral 2021    Procedure: SALPINGECTOMY, LAPAROSCOPIC;  Surgeon: Faye Snider MD;  [facility];  Service: Gynecology    ND NEUROPLASTY &/TRANSPOS MEDIAN NRV CARPAL TUNNE Left 4/2/2021    Procedure: CARPAL TUNNEL RELEASE;  Surgeon: Elroy Riedel, MD;  Location: [facility];  Service: Orthopedics    WISDOM TOOTH EXTRACTION         Current Outpatient Medications   Medication Sig Dispense Refill    azithromycin (Zithromax) 250 mg tablet Take 2 tablets (500 mg total) by mouth daily for 1 day, THEN 1 tablet (250 mg total) daily for 4 days. 6 tablet 0    mesalamine (PENTASA) 500 mg CR capsule Take 1 capsule (500 mg total) by mouth 3 (three) times a day 270 capsule 3    rizatriptan (MAXALT) 10 mg tablet May repeat in 2 hours if needed 27 tablet 1    buprenorphine-naloxone (Suboxone) 2-0.5 mg Place 1 Film (2 mg total) under the tongue daily (Patient not taking: Reported on 12/7/2023) 30 Film 0    cloNIDine (CATAPRES) 0.2 mg tablet Take 1 tablet (0.2 mg total) by mouth 2 (two) times a day (Patient not taking: Reported on 12/7/2023) 180 tablet 1    mirtazapine (REMERON) 15 mg tablet Take 1 tablet (15 mg total) by mouth daily at bedtime (Patient not taking: Reported on 12/7/2023) 90 tablet 1    sertraline (ZOLOFT) 100 mg tablet 1 tablet daily for 2 weeks then increase to 2 tablets daily (Patient not taking: Reported on 12/7/2023) 180 tablet 1     No current facility-administered medications for this visit. No Known Allergies    Review of Systems   Constitutional:  Negative for fatigue and fever. HENT:  Positive for congestion, postnasal drip, sinus pressure and sore throat. Negative for ear discharge, ear pain, tinnitus and trouble swallowing. Eyes:  Negative for discharge, itching and visual disturbance. Respiratory:  Negative for cough and shortness of breath. Cardiovascular:  Negative for chest pain and palpitations. Gastrointestinal:  Negative for abdominal pain, diarrhea, nausea and vomiting. Endocrine: Negative for cold intolerance and polyuria. Genitourinary:  Negative for difficulty urinating, dysuria and urgency.    Musculoskeletal: Negative for arthralgias and neck pain. Skin:  Negative for rash. Allergic/Immunologic: Negative for environmental allergies. Neurological:  Negative for dizziness, weakness and headaches. Psychiatric/Behavioral:  The patient is not nervous/anxious. Video Exam    Vitals:    12/07/23 1415   Temp: 97.8 °F (36.6 °C)   TempSrc: Tympanic   Weight: 75.3 kg (166 lb)   Height: 5' 8.43" (1.738 m)       Physical Exam  Constitutional:       Appearance: She is not ill-appearing. HENT:      Right Ear: External ear normal.      Left Ear: External ear normal.      Nose: Rhinorrhea present. Mouth/Throat:      Pharynx: Posterior oropharyngeal erythema present. No oropharyngeal exudate. Eyes:      Conjunctiva/sclera: Conjunctivae normal.   Pulmonary:      Effort: No respiratory distress. Musculoskeletal:      Cervical back: Normal range of motion. Right lower leg: No edema. Left lower leg: No edema. Skin:     Findings: No lesion or rash. Neurological:      Mental Status: She is oriented to person, place, and time.           Visit Time  Total Visit Duration: 15

## 2024-01-10 ENCOUNTER — APPOINTMENT (OUTPATIENT)
Dept: LAB | Facility: HOSPITAL | Age: 44
End: 2024-01-10
Payer: MEDICARE

## 2024-01-10 DIAGNOSIS — Z11.1 SCREENING FOR TUBERCULOSIS: Primary | ICD-10-CM

## 2024-01-10 DIAGNOSIS — Z11.1 SCREENING FOR TUBERCULOSIS: ICD-10-CM

## 2024-01-10 PROCEDURE — 36415 COLL VENOUS BLD VENIPUNCTURE: CPT

## 2024-01-10 PROCEDURE — 86480 TB TEST CELL IMMUN MEASURE: CPT

## 2024-01-12 ENCOUNTER — TELEPHONE (OUTPATIENT)
Age: 44
End: 2024-01-12

## 2024-01-12 LAB
GAMMA INTERFERON BACKGROUND BLD IA-ACNC: 0.01 IU/ML
M TB IFN-G BLD-IMP: NEGATIVE
M TB IFN-G CD4+ BCKGRND COR BLD-ACNC: 0 IU/ML
M TB IFN-G CD4+ BCKGRND COR BLD-ACNC: 0.1 IU/ML
MITOGEN IGNF BCKGRD COR BLD-ACNC: 9.99 IU/ML

## 2024-01-16 ENCOUNTER — TELEPHONE (OUTPATIENT)
Age: 44
End: 2024-01-16

## 2024-01-16 NOTE — TELEPHONE ENCOUNTER
Patient needs to get refills from psychiatry. She should call them and ask to refill.     We have never prescribed this medication for this patient.     Also, we don't even know what dosage she is taking.     Dr. SANTANA, please advise.

## 2024-01-16 NOTE — TELEPHONE ENCOUNTER
Patient called asking for a refill of hydroxyzine. Patient stated she is is out of the medication and won't be able to see her psychiatrist until the end of the month and due to some life events, she needs the medication sooner. Patient does not know the mg

## 2024-01-19 DIAGNOSIS — F31.89 SEVERE BIPOLAR DISORDER WITH PSYCHOTIC FEATURES (HCC): Primary | ICD-10-CM

## 2024-01-19 RX ORDER — HYDROXYZINE HYDROCHLORIDE 25 MG/1
25 TABLET, FILM COATED ORAL EVERY 6 HOURS PRN
Qty: 120 TABLET | Refills: 1 | Status: SHIPPED | OUTPATIENT
Start: 2024-01-19

## 2024-02-05 PROBLEM — J02.9 ACUTE PHARYNGITIS: Status: RESOLVED | Noted: 2021-07-01 | Resolved: 2024-02-05

## 2024-03-21 ENCOUNTER — RA CDI HCC (OUTPATIENT)
Dept: OTHER | Facility: HOSPITAL | Age: 44
End: 2024-03-21

## 2024-05-23 ENCOUNTER — TELEPHONE (OUTPATIENT)
Dept: PSYCHIATRY | Facility: CLINIC | Age: 44
End: 2024-05-23

## 2024-05-23 NOTE — TELEPHONE ENCOUNTER
The patient contacted the office, requesting to speak with the manager. The writer notified the manager of the call. The manager will be in contact at his earliest convenience.

## 2024-07-17 ENCOUNTER — TELEPHONE (OUTPATIENT)
Age: 44
End: 2024-07-17

## 2024-09-13 ENCOUNTER — TELEPHONE (OUTPATIENT)
Age: 44
End: 2024-09-13

## 2024-09-13 NOTE — TELEPHONE ENCOUNTER
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    Fax # [phone] [facility]    Please reach out to the patient with any questions. And please let patient know when the diagnosis letter is faxed over.    Thank you!!

## 2024-11-01 ENCOUNTER — TELEPHONE (OUTPATIENT)
Age: 44
End: 2024-11-01

## 2024-11-01 NOTE — TELEPHONE ENCOUNTER
Pt called for refill on Zoloft 100mg 1 time per day. Pt was Seeing a psychiatrist that prescribed it to her but has since stopped accepting her insurance. Pt also tried to make an appt but can not because she has been dismissed as a pt. Pt did not receive notification of this and would like to speak to someone about it please. Thank you

## 2024-11-02 NOTE — TELEPHONE ENCOUNTER
Dismissal letter sent 10/20/2024 so we manage her until 11/19/2024.      It would be discretion of Dr Alba whether to refill Zoloft as he is not the original prescriber.      I will call and discuss dismissal with patient.

## 2024-11-04 ENCOUNTER — TELEPHONE (OUTPATIENT)
Age: 44
End: 2024-11-04

## 2024-11-04 DIAGNOSIS — F31.78 BIPOLAR DISORDER, IN FULL REMISSION, MOST RECENT EPISODE MIXED (HCC): Primary | ICD-10-CM

## 2024-11-04 RX ORDER — SERTRALINE HYDROCHLORIDE 100 MG/1
100 TABLET, FILM COATED ORAL DAILY
Qty: 30 TABLET | Refills: 1 | Status: SHIPPED | OUTPATIENT
Start: 2024-11-04

## 2024-11-04 NOTE — TELEPHONE ENCOUNTER
Pt called to check status of her refill. She has been without her medication for 3 days. I attempted a warm transfer to [facility]; however, clinical was not available at time of call.       Please advise patient at [phone]

## 2024-11-04 NOTE — TELEPHONE ENCOUNTER
Spoke to pharmacist mohammand at [facility] [phone] last fill was May 15th pickup [address]. Sertraline 100 mg #30 take 1 tab in the morning.

## 2024-11-04 NOTE — TELEPHONE ENCOUNTER
In another task pt asked about status of refill.    Please contact pt and find out the pharmacy medication was last filled.  Please call pharmacy and obtain exact strength and SIG and name of pharmacy and pharmacist spoken to.      Once information is obtained, send to me.  Thank you.

## 2024-11-04 NOTE — TELEPHONE ENCOUNTER
"Called and spoke to patient. She stated she had a \"supply\" when she was being managed by [facility].    "

## 2024-11-04 NOTE — TELEPHONE ENCOUNTER
Pt stated absolutely was taking 100 mg QD . She stated she had a supply from the [facility] when she was hosp last year.  Offiered to give her #30/1.  She was satisfied with that.

## 2024-11-27 DIAGNOSIS — F31.78 BIPOLAR DISORDER, IN FULL REMISSION, MOST RECENT EPISODE MIXED (HCC): ICD-10-CM

## 2024-11-29 RX ORDER — SERTRALINE HYDROCHLORIDE 100 MG/1
100 TABLET, FILM COATED ORAL DAILY
Qty: 90 TABLET | Refills: 0 | Status: SHIPPED | OUTPATIENT
Start: 2024-11-29

## 2025-03-06 DIAGNOSIS — F31.78 BIPOLAR DISORDER, IN FULL REMISSION, MOST RECENT EPISODE MIXED (HCC): ICD-10-CM

## 2025-03-07 RX ORDER — SERTRALINE HYDROCHLORIDE 100 MG/1
100 TABLET, FILM COATED ORAL DAILY
Qty: 90 TABLET | Refills: 0 | OUTPATIENT
Start: 2025-03-07

## 2025-03-12 DIAGNOSIS — F31.78 BIPOLAR DISORDER, IN FULL REMISSION, MOST RECENT EPISODE MIXED (HCC): ICD-10-CM

## 2025-03-13 RX ORDER — SERTRALINE HYDROCHLORIDE 100 MG/1
100 TABLET, FILM COATED ORAL DAILY
Qty: 30 TABLET | OUTPATIENT
Start: 2025-03-13

## 2025-03-23 DIAGNOSIS — F31.78 BIPOLAR DISORDER, IN FULL REMISSION, MOST RECENT EPISODE MIXED (HCC): ICD-10-CM

## 2025-03-25 RX ORDER — SERTRALINE HYDROCHLORIDE 100 MG/1
100 TABLET, FILM COATED ORAL DAILY
Qty: 30 TABLET | OUTPATIENT
Start: 2025-03-25

## (undated) DEVICE — CHLORAPREP HI-LITE 26ML ORANGE

## (undated) DEVICE — INTENDED FOR TISSUE SEPARATION, AND OTHER PROCEDURES THAT REQUIRE A SHARP SURGICAL BLADE TO PUNCTURE OR CUT.: Brand: BARD-PARKER ® CARBON RIB-BACK BLADES

## (undated) DEVICE — GLOVE PI ULTRA TOUCH SZ.8.0

## (undated) DEVICE — MAXI PAD5.51 X 13.78 IN. (14.0 X 35.0 CM)HEAVYCONTOUREDUNSCENTED: Brand: CURITY

## (undated) DEVICE — BOWL: 16OZ PEELPOUCH 75/CS 16/PLT: Brand: MEDEGEN MEDICAL PRODUCTS, LLC

## (undated) DEVICE — LIGHT GLOVE GREEN

## (undated) DEVICE — MAYO STAND COVER: Brand: CONVERTORS

## (undated) DEVICE — HEAVY DUTY TABLE COVER: Brand: CONVERTORS

## (undated) DEVICE — NEEDLE COUNTER LG W/RULER

## (undated) DEVICE — TOWEL SET X-RAY

## (undated) DEVICE — SYRINGE 10ML LL

## (undated) DEVICE — ACE WRAP 3 IN UNSTERILE

## (undated) DEVICE — SPONGE 4 X 4 XRAY 16 PLY STRL LF RFD

## (undated) DEVICE — 3M™ STERI-STRIP™ REINFORCED ADHESIVE SKIN CLOSURES, R1547, 1/2 IN X 4 IN (12 MM X 100 MM), 6 STRIPS/ENVELOPE: Brand: 3M™ STERI-STRIP™

## (undated) DEVICE — TIBURON HAND DRAPE: Brand: CONVERTORS

## (undated) DEVICE — GAUZE SPONGES,16 PLY: Brand: CURITY

## (undated) DEVICE — KNIFE CARPAL TUNNEL DISP

## (undated) DEVICE — TRAY FOLEY 16FR URIMETER SILICONE SURESTEP

## (undated) DEVICE — INTENDED FOR TISSUE SEPARATION, AND OTHER PROCEDURES THAT REQUIRE A SHARP SURGICAL BLADE TO PUNCTURE OR CUT.: Brand: BARD-PARKER SAFETY BLADES SIZE 11, STERILE

## (undated) DEVICE — GLOVE INDICATOR PI UNDERGLOVE SZ 8 BLUE

## (undated) DEVICE — NEEDLE 25G X 1 1/2

## (undated) DEVICE — GLOVE INDICATOR PI UNDERGLOVE SZ 6.5 BLUE

## (undated) DEVICE — ASTOUND STANDARD SURGICAL GOWN, XL: Brand: CONVERTORS

## (undated) DEVICE — ENSEAL LAPAROSCOPIC TISSUE SEALER G2 CURVED JAW FOR USE WITH G2 GENERATOR 5MM DIAMETER 35CM SHAFT LENGTH: Brand: ENSEAL

## (undated) DEVICE — BETHLEHEM UNIVERSAL GYN LAP PK: Brand: CARDINAL HEALTH

## (undated) DEVICE — PREMIUM DRY TRAY LF: Brand: MEDLINE INDUSTRIES, INC.

## (undated) DEVICE — SYRINGE BULB 2 OZ

## (undated) DEVICE — SUT VICRYL 4-0 PS-2 27 IN J426H

## (undated) DEVICE — CHLORHEXIDINE 4PCT 4 OZ

## (undated) DEVICE — CURITY NON-ADHERENT STRIPS: Brand: CURITY

## (undated) DEVICE — ACE WRAP 4 IN STERILE

## (undated) DEVICE — GLOVE PI ULTRA TOUCH SZ.6.5

## (undated) DEVICE — IRRIG ENDO FLO TUBING

## (undated) DEVICE — NEEDLE 18 G X 1 1/2

## (undated) DEVICE — TROCAR: Brand: KII® SLEEVE

## (undated) DEVICE — GAUZE SPONGES,USP TYPE VII GAUZE, 12 PLY: Brand: CURITY

## (undated) DEVICE — DRAPE SHEET THREE QUARTER

## (undated) DEVICE — SUT ETHILON 4-0 PS-2 18 IN 1667H

## (undated) DEVICE — ADHESIVE SKIN HIGH VISCOSITY EXOFIN 1ML

## (undated) DEVICE — TROCAR: Brand: KII FIOS FIRST ENTRY

## (undated) DEVICE — SKIN MARKER DUAL TIP WITH RULER CAP, FLEXIBLE RULER AND LABELS: Brand: DEVON